# Patient Record
Sex: MALE | Race: BLACK OR AFRICAN AMERICAN | NOT HISPANIC OR LATINO | Employment: FULL TIME | URBAN - METROPOLITAN AREA
[De-identification: names, ages, dates, MRNs, and addresses within clinical notes are randomized per-mention and may not be internally consistent; named-entity substitution may affect disease eponyms.]

---

## 2017-01-09 ENCOUNTER — NURSE TRIAGE (OUTPATIENT)
Dept: ADMINISTRATIVE | Facility: CLINIC | Age: 57
End: 2017-01-09

## 2017-01-09 DIAGNOSIS — I10 ESSENTIAL HYPERTENSION: ICD-10-CM

## 2017-01-09 RX ORDER — LISINOPRIL 40 MG/1
TABLET ORAL
Qty: 135 TABLET | Refills: 0 | Status: SHIPPED | OUTPATIENT
Start: 2017-01-09 | End: 2017-03-02 | Stop reason: SDUPTHER

## 2017-01-10 NOTE — TELEPHONE ENCOUNTER
"  Reason for Disposition   [1] Toe injury AND [2] bad limp or can't wear shoes/sandals    Answer Assessment - Initial Assessment Questions  1. MECHANISM: "How did the injury happen?"       A large metal canister at work rollled over foot  2. ONSET: "When did the injury happen?" (Minutes or hours ago)       About noon today  3. LOCATION: "What part of the toe is injured?" "Is the nail damaged?"       No nail damage. Large toe and next 2 toes of right foot look a little swollen  4. APPEARANCE of TOE INJURY: "What does the injury look like?"       As above  5. SEVERITY: "Can you use the foot normally?" "Can you walk?"       Has been walking all day. Became more painful when he took his shoes off.   6. SIZE: For cuts, bruises, or swelling, ask: "How large is it?" (e.g., inches or centimeters;  entire toe)       Mild swelling only reported  7. PAIN: "Is there pain?" If so, ask: "How bad is the pain?"   (e.g., Scale 1-10; or mild, moderate, severe)      Rated pain about 8.5- joints are tender to touch  8. TETANUS: For any breaks in the skin, ask: "When was the last tetanus booster?"      n/a  9. DIABETES: "Do you have a history of diabetes or poor circulation in the feet?"      no  10. OTHER SYMPTOMS: "Do you have any other symptoms?"         None reported  11. PREGNANCY: "Is there any chance you are pregnant?" "When was your last menstrual period?"        n/a   Has not taken anything for pain. Calling to ask if getting in his jacuzzi will help.    Protocols used: ST TOE INJURY-A-    "

## 2017-01-23 DIAGNOSIS — I51.89 DIASTOLIC DYSFUNCTION: ICD-10-CM

## 2017-01-23 DIAGNOSIS — F51.02 TRANSIENT INSOMNIA: ICD-10-CM

## 2017-01-23 RX ORDER — ALPRAZOLAM 1 MG/1
TABLET ORAL
Qty: 30 TABLET | Refills: 0 | OUTPATIENT
Start: 2017-01-23

## 2017-01-23 RX ORDER — FUROSEMIDE 20 MG/1
TABLET ORAL
Qty: 15 TABLET | Refills: 0 | Status: SHIPPED | OUTPATIENT
Start: 2017-01-23 | End: 2017-03-02 | Stop reason: SDUPTHER

## 2017-01-25 DIAGNOSIS — F51.02 TRANSIENT INSOMNIA: ICD-10-CM

## 2017-01-25 RX ORDER — ALPRAZOLAM 1 MG/1
TABLET ORAL
Qty: 30 TABLET | Refills: 0 | OUTPATIENT
Start: 2017-01-25

## 2017-03-02 ENCOUNTER — OFFICE VISIT (OUTPATIENT)
Dept: FAMILY MEDICINE | Facility: CLINIC | Age: 57
End: 2017-03-02
Payer: COMMERCIAL

## 2017-03-02 VITALS
BODY MASS INDEX: 42.66 KG/M2 | HEART RATE: 60 BPM | HEIGHT: 72 IN | TEMPERATURE: 99 F | DIASTOLIC BLOOD PRESSURE: 70 MMHG | WEIGHT: 315 LBS | SYSTOLIC BLOOD PRESSURE: 128 MMHG

## 2017-03-02 DIAGNOSIS — K21.00 GASTROESOPHAGEAL REFLUX DISEASE WITH ESOPHAGITIS: ICD-10-CM

## 2017-03-02 DIAGNOSIS — I10 ESSENTIAL HYPERTENSION: Primary | ICD-10-CM

## 2017-03-02 DIAGNOSIS — Z13.9 SCREENING: ICD-10-CM

## 2017-03-02 DIAGNOSIS — F51.02 TRANSIENT INSOMNIA: ICD-10-CM

## 2017-03-02 DIAGNOSIS — E78.5 HYPERLIPIDEMIA, UNSPECIFIED HYPERLIPIDEMIA TYPE: ICD-10-CM

## 2017-03-02 DIAGNOSIS — I51.89 DIASTOLIC DYSFUNCTION: ICD-10-CM

## 2017-03-02 PROCEDURE — 99999 PR PBB SHADOW E&M-EST. PATIENT-LVL III: CPT | Mod: PBBFAC,,, | Performed by: FAMILY MEDICINE

## 2017-03-02 PROCEDURE — 99396 PREV VISIT EST AGE 40-64: CPT | Mod: S$GLB,,, | Performed by: FAMILY MEDICINE

## 2017-03-02 PROCEDURE — 3078F DIAST BP <80 MM HG: CPT | Mod: S$GLB,,, | Performed by: FAMILY MEDICINE

## 2017-03-02 PROCEDURE — 3074F SYST BP LT 130 MM HG: CPT | Mod: S$GLB,,, | Performed by: FAMILY MEDICINE

## 2017-03-02 RX ORDER — LOVASTATIN 40 MG/1
TABLET ORAL
Qty: 90 TABLET | Refills: 1 | Status: SHIPPED | OUTPATIENT
Start: 2017-03-02 | End: 2019-03-19 | Stop reason: SDUPTHER

## 2017-03-02 RX ORDER — OMEPRAZOLE 40 MG/1
40 CAPSULE, DELAYED RELEASE ORAL DAILY
Qty: 90 CAPSULE | Refills: 3 | Status: SHIPPED | OUTPATIENT
Start: 2017-03-02 | End: 2018-09-13 | Stop reason: SDUPTHER

## 2017-03-02 RX ORDER — ALPRAZOLAM 1 MG/1
TABLET ORAL
Qty: 30 TABLET | Refills: 5 | Status: SHIPPED | OUTPATIENT
Start: 2017-03-02 | End: 2017-09-08 | Stop reason: SDUPTHER

## 2017-03-02 RX ORDER — FUROSEMIDE 20 MG/1
TABLET ORAL
Qty: 90 TABLET | Refills: 3 | Status: SHIPPED | OUTPATIENT
Start: 2017-03-02 | End: 2018-05-09 | Stop reason: SDUPTHER

## 2017-03-02 RX ORDER — LISINOPRIL 40 MG/1
40 TABLET ORAL DAILY
Qty: 90 TABLET | Refills: 3 | Status: SHIPPED | OUTPATIENT
Start: 2017-03-02 | End: 2018-07-13 | Stop reason: SDUPTHER

## 2017-03-02 NOTE — MR AVS SNAPSHOT
[facility]  [address], [address]  [place] [address]  Phone: [phone]  Fax: [phone]                  Reagan Carbajal   3/2/2017 1:00 PM   Office Visit    Description:  Male : 1960   Provider:  Carmen Cosby MD   Department:  [facility]           Reason for Visit     Annual Exam           Diagnoses this Visit        Comments    Essential hypertension    -  Primary     Diastolic dysfunction         Hyperlipidemia, unspecified hyperlipidemia type         Gastroesophageal reflux disease with esophagitis         Transient insomnia         Screening                To Do List           Goals (5 Years of Data)     None       These Medications        Disp Refills Start End    furosemide (LASIX) 20 MG tablet 90 tablet 3 3/2/2017     TAKE ONETABLET BY MOUTH ONCE DAILY    Pharmacy: [facility] [address] [place] [address] Ph #: [phone]       lisinopril (PRINIVIL,ZESTRIL) 40 MG tablet 90 tablet 3 3/2/2017     Take 1 tablet (40 mg total) by mouth once daily. - Oral    Pharmacy: [facility] [address] [place] [address] Ph #: [phone]       lovastatin (MEVACOR) 40 MG tablet 90 tablet 1 3/2/2017     TAKE ONE TABLET BY MOUTH ONCE DAILY NIGHTLY    Pharmacy: [address] Ph #: [phone]       omeprazole (PRILOSEC) 40 MG capsule 90 capsule 3 3/2/2017     Take 1 capsule (40 mg total) by mouth once daily. - Oral    Pharmacy: [facility] [address] [place] [address] Ph #: [phone]       alprazolam (XANAX) 1 MG tablet 30 tablet 5 3/2/2017     TAKE ONE TABLET BY MOUTH ONCE DAILY AT  NIGHT  AS  NEEDED  FOR  ANXIETY    Pharmacy: [address] Ph #: [phone]         [facility]     [facility] -  Assistance  Registered nurses  in the [facility] provide clinical advisement, health education, appointment booking, and other advisory services.  Call for this free service at [phone].             Medications           Message regarding Medications     Verify the changes and/or additions to your medication regime listed below are the same as discussed with your clinician today.  If any of these changes or additions are incorrect, please notify your healthcare provider.        CHANGE how you are taking these medications     Start Taking Instead of    furosemide (LASIX) 20 MG tablet furosemide (LASIX) 20 MG tablet    Dosage:  TAKE ONETABLET BY MOUTH ONCE DAILY Dosage:  TAKE ONE-HALF TABLET BY MOUTH ONCE DAILY    Reason for Change:  Reorder     lisinopril (PRINIVIL,ZESTRIL) 40 MG tablet lisinopril (PRINIVIL,ZESTRIL) 40 MG tablet    Dosage:  Take 1 tablet (40 mg total) by mouth once daily. Dosage:  TAKE ONE TABLET BY MOUTH ONCE DAILY    Reason for Change:  Reorder            Verify that the below list of medications is an accurate representation of the medications you are currently taking.  If none reported, the list may be blank. If incorrect, please contact your healthcare provider. Carry this list with you in case of emergency.           Current Medications     alprazolam (XANAX) 1 MG tablet TAKE ONE TABLET BY MOUTH ONCE DAILY AT  NIGHT  AS  NEEDED  FOR  ANXIETY    furosemide (LASIX) 20 MG tablet TAKE ONETABLET BY MOUTH ONCE DAILY    lisinopril (PRINIVIL,ZESTRIL) 40 MG tablet Take 1 tablet (40 mg total) by mouth once daily.    lovastatin (MEVACOR) 40 MG tablet TAKE ONE TABLET BY MOUTH ONCE DAILY NIGHTLY    omeprazole (PRILOSEC) 40 MG capsule Take 1 capsule (40 mg total) by mouth once daily.           Clinical Reference Information           Your Vitals Were     BP                   128/70 (BP Location: Right arm)           Blood Pressure          Most Recent Value    BP  128/70      Allergies as of 3/2/2017     Tramadol     Triamterene      Immunizations Administered on Date of Encounter - 3/2/2017     None      Orders Placed During Today's Visit     Future Labs/Procedures Expected by Expires    CBC auto differential  3/2/2017 5/1/2018    Comprehensive metabolic panel  3/2/2017 5/1/2018    Hemoglobin A1c  3/2/2017 5/1/2018    Lipid panel  3/2/2017 5/1/2018    PSA, Screening  3/2/2017 5/1/2018    TSH  3/2/2017 5/1/2018      Language Assistance Services     ATTENTION: Language assistance services are available, free of charge. Please call [phone].      ATENCIÓN: Si habla español, tiene a wilks disposición servicios gratuitos de asistencia lingüística. Llame al [phone].     CHÚ Ý: N?u b?n nói Ti?ng Vi?t, có các d?ch v? h? tr? ngôn ng? mi?n phí dành cho b?n. G?i s? [phone].         [facility] complies with applicable Federal civil rights laws and does not discriminate on the basis of race, color, national origin, age, disability, or sex.

## 2017-03-03 NOTE — PROGRESS NOTES
Reagan Carbajal is a 56 y.o. male     Chief Complaint:  Physical Exam  Source of history: Patient  Past Medical History:   Diagnosis Date    Allergy     Anxiety     Colon polyp     CPAP (continuous positive airway pressure) dependence     does not use    GERD (gastroesophageal reflux disease)     Hyperlipidemia     Hypertension     Insomnia     Insomnia with sleep apnea     Morbid obesity     Neuromuscular disorder     Obesity      Patient  reports that he has quit smoking. His smoking use included Cigarettes and Cigars. He quit smokeless tobacco use about 23 years ago. He reports that he does not drink alcohol or use illicit drugs.  Family History   Problem Relation Age of Onset    Heart disease Mother     Hyperlipidemia Mother     Hypertension Mother     Lymphoma Mother     Hypertension Father     Hepatitis Brother      ROS:                                                                                GENERAL: No fever, chills, fatigability or weight loss. .  SKIN: No rashes, itching or changes in color or texture of skin.  HEAD: No headaches or recent head trauma.  EYES: Visual acuity fine. No photophobia, ocular pain or diplopia.  EARS: Denies ear pain, discharge or vertigo.  NOSE: No loss of smell, no epistaxis or postnasal drip.  MOUTH & THROAT: No hoarseness or change in voice. No excessive gum bleeding.  NODES: Denies swollen glands.  CHEST: Denies STAPLETON, cyanosis, wheezing, cough and sputum production.  CARDIOVASCULAR: Denies chest pain, PND, orthopnea or reduced exercise tolerance.  ABDOMEN: Appetite fine. No weight loss. Denies diarrhea, abdominal pain, hematemesis or blood in stool.  URINARY: No flank pain, dysuria or hematuria. Frequency of urination at nighttime.  PERIPHERAL VASCULAR: No claudication or cyanosis.  MUSCULOSKELETAL: left foot pain  NEUROLOGIC: No history of seizures, paralysis, alteration of gait or coordination.    OBJECTIVE:  APPEARANCE: Well nourished, well developed,  in no acute distress.   VS:  see nurses notes  3-2-17  SKIN: No lesions, good turgor, no rashes.   HEENT: TMs clear bilaterally, ear canals clear bilaterally, nasal mucosa clear bilaterally,   sinuses nontender to percussion, throat mild postnasal drip.  HEAD: Normocephalic, nontender to palpation.  NECK: Supple nontender, no carotid bruits, no thyromegaly.  NODES: Normal.  CHEST: Clear bilaterally, with good respiratory excursion, no evidence of respiratory distress.  CARDIOVASCULAR: S1, S2, regular rate and rhythm without murmur.  BREASTS: No masses palpated in either breast area, or axillary area, symmetry is noted.  ABDOMEN: Soft nontender no hepatosplenomegaly, no guarding or rebound, no pulsatile mass.  PERIPHERAL VASCULAR: Distal pulses palpable throughout, normal capillary refill in all distal extremities, no edema.  MUSCULOSKELETAL: bilateral ankle and foot swelling  BACK: No scoliosis, no spasm, cervical, thoracic, lumbar spine nontender to palpation  NEUROLOGIC: Cranial nerves II through XII grossly intact, motor exam 5 out of 5 on distal extremities,   no gait disturbances, sensation intact in all distal extremities  MENTAL STATUS: Patient oriented x3, normal affect, normal mood    ASSESSMENT/PLAN:   Reagan was seen today for annual exam.    Diagnoses and all orders for this visit:    Essential hypertension  -     CBC auto differential; Future  -     TSH; Future  -     Lipid panel; Future  -     Comprehensive metabolic panel; Future  -     Hemoglobin A1c; Future  -     lisinopril (PRINIVIL,ZESTRIL) 40 MG tablet; Take 1 tablet (40 mg total) by mouth once daily.    Diastolic dysfunction  -     furosemide (LASIX) 20 MG tablet; TAKE ONETABLET BY MOUTH ONCE DAILY    Hyperlipidemia, unspecified hyperlipidemia type  -     lovastatin (MEVACOR) 40 MG tablet; TAKE ONE TABLET BY MOUTH ONCE DAILY NIGHTLY    Gastroesophageal reflux disease with esophagitis  -     omeprazole (PRILOSEC) 40 MG capsule; Take 1 capsule (40  mg total) by mouth once daily.    Transient insomnia  -     alprazolam (XANAX) 1 MG tablet; TAKE ONE TABLET BY MOUTH ONCE DAILY AT  NIGHT  AS  NEEDED  FOR  ANXIETY    Screening  -     PSA, Screening; Future      Will contact pt with results when available. Recommend exercise and graduated weight loss program

## 2017-03-21 ENCOUNTER — TELEPHONE (OUTPATIENT)
Dept: FAMILY MEDICINE | Facility: CLINIC | Age: 57
End: 2017-03-21

## 2017-03-21 NOTE — TELEPHONE ENCOUNTER
Spoke with patient advised him since he just refilled the medication and received 90 tablets his insurance company will not cover it and he would need to pay out of pocket.   Patient states he receives his medication from [facility] and will check with them to see how much it will be.
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
## 2017-05-23 ENCOUNTER — LAB VISIT (OUTPATIENT)
Dept: LAB | Facility: HOSPITAL | Age: 57
End: 2017-05-23
Attending: FAMILY MEDICINE
Payer: COMMERCIAL

## 2017-05-23 DIAGNOSIS — Z13.9 ENCOUNTER FOR SCREENING, UNSPECIFIED: ICD-10-CM

## 2017-05-23 DIAGNOSIS — I10 ESSENTIAL HYPERTENSION: ICD-10-CM

## 2017-05-23 LAB
ALBUMIN SERPL BCP-MCNC: 3.9 G/DL
ALP SERPL-CCNC: 85 U/L
ALT SERPL W/O P-5'-P-CCNC: 20 U/L
ANION GAP SERPL CALC-SCNC: 8 MMOL/L
AST SERPL-CCNC: 18 U/L
BASOPHILS # BLD AUTO: 0.03 K/UL
BASOPHILS NFR BLD: 0.4 %
BILIRUB SERPL-MCNC: 0.6 MG/DL
BUN SERPL-MCNC: 14 MG/DL
CALCIUM SERPL-MCNC: 9.3 MG/DL
CHLORIDE SERPL-SCNC: 105 MMOL/L
CHOLEST/HDLC SERPL: 5 {RATIO}
CO2 SERPL-SCNC: 28 MMOL/L
COMPLEXED PSA SERPL-MCNC: 0.49 NG/ML
CREAT SERPL-MCNC: 1 MG/DL
DIFFERENTIAL METHOD: NORMAL
EOSINOPHIL # BLD AUTO: 0.2 K/UL
EOSINOPHIL NFR BLD: 3 %
ERYTHROCYTE [DISTWIDTH] IN BLOOD BY AUTOMATED COUNT: 13.6 %
EST. GFR  (AFRICAN AMERICAN): >60 ML/MIN/1.73 M^2
EST. GFR  (NON AFRICAN AMERICAN): >60 ML/MIN/1.73 M^2
GLUCOSE SERPL-MCNC: 97 MG/DL
HCT VFR BLD AUTO: 45.7 %
HDL/CHOLESTEROL RATIO: 20.1 %
HDLC SERPL-MCNC: 199 MG/DL
HDLC SERPL-MCNC: 40 MG/DL
HGB BLD-MCNC: 14.9 G/DL
LDLC SERPL CALC-MCNC: 136.2 MG/DL
LYMPHOCYTES # BLD AUTO: 2.4 K/UL
LYMPHOCYTES NFR BLD: 30.7 %
MCH RBC QN AUTO: 30 PG
MCHC RBC AUTO-ENTMCNC: 32.6 %
MCV RBC AUTO: 92 FL
MONOCYTES # BLD AUTO: 0.8 K/UL
MONOCYTES NFR BLD: 10.2 %
NEUTROPHILS # BLD AUTO: 4.4 K/UL
NEUTROPHILS NFR BLD: 55.6 %
NONHDLC SERPL-MCNC: 159 MG/DL
PLATELET # BLD AUTO: 229 K/UL
PMV BLD AUTO: 11.1 FL
POTASSIUM SERPL-SCNC: 4.9 MMOL/L
PROT SERPL-MCNC: 7.2 G/DL
RBC # BLD AUTO: 4.96 M/UL
SODIUM SERPL-SCNC: 141 MMOL/L
TRIGL SERPL-MCNC: 114 MG/DL
TSH SERPL DL<=0.005 MIU/L-ACNC: 2.93 UIU/ML
WBC # BLD AUTO: 7.95 K/UL

## 2017-05-23 PROCEDURE — 85025 COMPLETE CBC W/AUTO DIFF WBC: CPT

## 2017-05-23 PROCEDURE — 84153 ASSAY OF PSA TOTAL: CPT

## 2017-05-23 PROCEDURE — 83036 HEMOGLOBIN GLYCOSYLATED A1C: CPT

## 2017-05-23 PROCEDURE — 36415 COLL VENOUS BLD VENIPUNCTURE: CPT | Mod: PO

## 2017-05-23 PROCEDURE — 80053 COMPREHEN METABOLIC PANEL: CPT

## 2017-05-23 PROCEDURE — 80061 LIPID PANEL: CPT

## 2017-05-23 PROCEDURE — 84443 ASSAY THYROID STIM HORMONE: CPT

## 2017-05-24 LAB
ESTIMATED AVG GLUCOSE: 114 MG/DL
HBA1C MFR BLD HPLC: 5.6 %

## 2017-09-08 DIAGNOSIS — F51.02 TRANSIENT INSOMNIA: ICD-10-CM

## 2017-09-08 RX ORDER — ALPRAZOLAM 1 MG/1
TABLET ORAL
Qty: 30 TABLET | Refills: 0 | Status: SHIPPED | OUTPATIENT
Start: 2017-09-08 | End: 2017-12-08 | Stop reason: SDUPTHER

## 2017-09-11 NOTE — TELEPHONE ENCOUNTER
Attempted to contact patient voicemail left advising to return our call to schedule f/u appt to receive any further refills on requested medication.  Xanax RX faxed to [facility].

## 2017-11-09 ENCOUNTER — HOSPITAL ENCOUNTER (OUTPATIENT)
Dept: RADIOLOGY | Facility: HOSPITAL | Age: 57
Discharge: HOME | End: 2017-11-09
Attending: FAMILY MEDICINE
Payer: COMMERCIAL

## 2017-11-09 ENCOUNTER — OFFICE VISIT (OUTPATIENT)
Dept: FAMILY MEDICINE | Facility: CLINIC | Age: 57
End: 2017-11-09
Payer: COMMERCIAL

## 2017-11-09 VITALS
DIASTOLIC BLOOD PRESSURE: 76 MMHG | SYSTOLIC BLOOD PRESSURE: 146 MMHG | HEIGHT: 70 IN | TEMPERATURE: 98 F | BODY MASS INDEX: 45.1 KG/M2 | WEIGHT: 315 LBS

## 2017-11-09 DIAGNOSIS — M25.562 LEFT KNEE PAIN, UNSPECIFIED CHRONICITY: ICD-10-CM

## 2017-11-09 DIAGNOSIS — M79.671 RIGHT FOOT PAIN: Primary | ICD-10-CM

## 2017-11-09 DIAGNOSIS — M79.671 RIGHT FOOT PAIN: ICD-10-CM

## 2017-11-09 PROCEDURE — 99999 PR PBB SHADOW E&M-EST. PATIENT-LVL III: CPT | Mod: PBBFAC,,, | Performed by: FAMILY MEDICINE

## 2017-11-09 PROCEDURE — 99214 OFFICE O/P EST MOD 30 MIN: CPT | Mod: S$GLB,,, | Performed by: FAMILY MEDICINE

## 2017-11-09 RX ORDER — DICLOFENAC SODIUM 50 MG/1
50 TABLET, DELAYED RELEASE ORAL 2 TIMES DAILY
Qty: 40 TABLET | Refills: 0 | Status: SHIPPED | OUTPATIENT
Start: 2017-11-09 | End: 2017-12-31 | Stop reason: ALTCHOICE

## 2017-11-09 RX ORDER — HYDROCODONE BITARTRATE AND ACETAMINOPHEN 5; 325 MG/1; MG/1
1 TABLET ORAL EVERY 6 HOURS PRN
Qty: 30 TABLET | Refills: 0 | Status: SHIPPED | OUTPATIENT
Start: 2017-11-09 | End: 2018-02-08 | Stop reason: SDUPTHER

## 2017-11-10 ENCOUNTER — HOSPITAL ENCOUNTER (OUTPATIENT)
Dept: RADIOLOGY | Facility: HOSPITAL | Age: 57
Discharge: HOME | End: 2017-11-10
Attending: FAMILY MEDICINE
Payer: COMMERCIAL

## 2017-11-10 ENCOUNTER — TELEPHONE (OUTPATIENT)
Dept: FAMILY MEDICINE | Facility: CLINIC | Age: 57
End: 2017-11-10

## 2017-11-10 DIAGNOSIS — M79.671 RIGHT FOOT PAIN: ICD-10-CM

## 2017-11-10 PROCEDURE — 73630 X-RAY EXAM OF FOOT: CPT | Mod: 26,RT,, | Performed by: RADIOLOGY

## 2017-11-10 PROCEDURE — 73560 X-RAY EXAM OF KNEE 1 OR 2: CPT | Mod: 26,LT,, | Performed by: RADIOLOGY

## 2017-11-10 PROCEDURE — 73560 X-RAY EXAM OF KNEE 1 OR 2: CPT | Mod: TC,LT

## 2017-11-10 PROCEDURE — 73630 X-RAY EXAM OF FOOT: CPT | Mod: TC,RT

## 2017-11-10 NOTE — TELEPHONE ENCOUNTER
Spoke with Lesvia with radiology department   Ext [phone] states you ordered an X-Ray of the left foot but patient stating it's suppose to be the right foot.

## 2017-11-10 NOTE — PROGRESS NOTES
Subjective:       Patient ID: Reagan Carbajal is a 57 y.o. male.    Chief Complaint: Foot Pain (top of right) and Knee Pain (left knee)   patient is having pain on and off over the last couple of weeks.  Patient suspects   he may have gout since it runs in his family.  HPISee above   Review of Systems   Constitutional: Negative.    HENT: Negative.    Eyes: Negative.    Respiratory: Negative.    Cardiovascular: Negative.    Gastrointestinal: Negative.    Endocrine: Negative.    Genitourinary: Negative.    Musculoskeletal: Positive for arthralgias, gait problem and myalgias.   Skin: Positive for color change.   Allergic/Immunologic: Negative.    Neurological: Positive for numbness.   Hematological: Negative.    Psychiatric/Behavioral: Negative.        Objective:      Physical Exam   Constitutional: He appears well-developed and well-nourished. He appears distressed.   HENT:   Head: Normocephalic and atraumatic.   Eyes: Conjunctivae and EOM are normal. Pupils are equal, round, and reactive to light.   Neck: Normal range of motion. Neck supple. No JVD present.   Musculoskeletal:        Right knee: Normal.        Left knee: He exhibits swelling and deformity. He exhibits no erythema. Tenderness found. Patellar tendon tenderness noted. No lateral joint line tenderness noted.        Right ankle: Normal.        Left ankle: Normal.        Right foot: There is decreased range of motion, tenderness, swelling and deformity.        Left foot: Normal.   Nursing note and vitals reviewed.      Assessment:       1. Right foot pain    2. Left knee pain, unspecified chronicity     3.     Possible gout  Plan:     will contact the patient results of testing when available.   Uric acid            X-Ray Foot Complete Left         X-Ray Knee 1 or 2 View Left        Referring med card dated 11/9/2017  diclofenac (VOLTAREN) 50 MG EC tablet  hydrocodone-acetaminophen 5-325mg (NORCO) 5-325 mg per tablet

## 2017-11-13 ENCOUNTER — TELEPHONE (OUTPATIENT)
Dept: FAMILY MEDICINE | Facility: CLINIC | Age: 57
End: 2017-11-13

## 2017-12-08 DIAGNOSIS — F51.02 TRANSIENT INSOMNIA: ICD-10-CM

## 2017-12-09 RX ORDER — ALPRAZOLAM 1 MG/1
TABLET ORAL
Qty: 30 TABLET | Refills: 5 | Status: SHIPPED | OUTPATIENT
Start: 2017-12-09 | End: 2018-06-20 | Stop reason: SDUPTHER

## 2017-12-31 ENCOUNTER — OFFICE VISIT (OUTPATIENT)
Dept: FAMILY MEDICINE | Facility: CLINIC | Age: 57
End: 2017-12-31
Payer: COMMERCIAL

## 2017-12-31 VITALS
TEMPERATURE: 100 F | BODY MASS INDEX: 45.1 KG/M2 | HEIGHT: 70 IN | WEIGHT: 315 LBS | SYSTOLIC BLOOD PRESSURE: 145 MMHG | HEART RATE: 82 BPM | DIASTOLIC BLOOD PRESSURE: 79 MMHG | OXYGEN SATURATION: 97 %

## 2017-12-31 DIAGNOSIS — J01.90 ACUTE BACTERIAL SINUSITIS: Primary | ICD-10-CM

## 2017-12-31 DIAGNOSIS — I10 ESSENTIAL HYPERTENSION: ICD-10-CM

## 2017-12-31 DIAGNOSIS — B96.89 ACUTE BACTERIAL SINUSITIS: Primary | ICD-10-CM

## 2017-12-31 DIAGNOSIS — R50.9 FEVER AND CHILLS: ICD-10-CM

## 2017-12-31 DIAGNOSIS — M10.9 GOUT, UNSPECIFIED CAUSE, UNSPECIFIED CHRONICITY, UNSPECIFIED SITE: ICD-10-CM

## 2017-12-31 DIAGNOSIS — M79.10 MYALGIA: ICD-10-CM

## 2017-12-31 LAB
CTP QC/QA: YES
FLUAV AG NPH QL: NEGATIVE
FLUBV AG NPH QL: NEGATIVE

## 2017-12-31 PROCEDURE — 99214 OFFICE O/P EST MOD 30 MIN: CPT | Mod: 25,S$GLB,, | Performed by: NURSE PRACTITIONER

## 2017-12-31 PROCEDURE — 87804 INFLUENZA ASSAY W/OPTIC: CPT | Mod: 59,QW,S$GLB, | Performed by: NURSE PRACTITIONER

## 2017-12-31 PROCEDURE — 96372 THER/PROPH/DIAG INJ SC/IM: CPT | Mod: S$GLB,,, | Performed by: INTERNAL MEDICINE

## 2017-12-31 PROCEDURE — 99999 PR PBB SHADOW E&M-EST. PATIENT-LVL IV: CPT | Mod: PBBFAC,,, | Performed by: NURSE PRACTITIONER

## 2017-12-31 RX ORDER — ACETAMINOPHEN 500 MG
1000 TABLET ORAL
Status: COMPLETED | OUTPATIENT
Start: 2017-12-31 | End: 2017-12-31

## 2017-12-31 RX ORDER — METHYLPREDNISOLONE 4 MG/1
TABLET ORAL
Qty: 1 PACKAGE | Refills: 0 | Status: SHIPPED | OUTPATIENT
Start: 2017-12-31 | End: 2018-01-21

## 2017-12-31 RX ORDER — INDOMETHACIN 50 MG/1
50 CAPSULE ORAL
Qty: 90 CAPSULE | Refills: 0 | Status: SHIPPED | OUTPATIENT
Start: 2017-12-31 | End: 2018-09-13 | Stop reason: SDUPTHER

## 2017-12-31 RX ORDER — CODEINE PHOSPHATE AND GUAIFENESIN 10; 100 MG/5ML; MG/5ML
5 SOLUTION ORAL 3 TIMES DAILY PRN
Qty: 118 ML | Refills: 0 | Status: SHIPPED | OUTPATIENT
Start: 2017-12-31 | End: 2018-01-10

## 2017-12-31 RX ORDER — TRIAMCINOLONE ACETONIDE 40 MG/ML
40 INJECTION, SUSPENSION INTRA-ARTICULAR; INTRAMUSCULAR
Status: COMPLETED | OUTPATIENT
Start: 2017-12-31 | End: 2017-12-31

## 2017-12-31 RX ORDER — AMOXICILLIN AND CLAVULANATE POTASSIUM 875; 125 MG/1; MG/1
1 TABLET, FILM COATED ORAL 2 TIMES DAILY
Qty: 20 TABLET | Refills: 0 | Status: SHIPPED | OUTPATIENT
Start: 2017-12-31 | End: 2018-01-10

## 2017-12-31 RX ADMIN — TRIAMCINOLONE ACETONIDE 40 MG: 40 INJECTION, SUSPENSION INTRA-ARTICULAR; INTRAMUSCULAR at 11:12

## 2017-12-31 RX ADMIN — Medication 1000 MG: at 10:12

## 2017-12-31 NOTE — PATIENT INSTRUCTIONS
Sinusitis (Antibiotic Treatment)    The sinuses are air-filled spaces within the bones of the face. They connect to the inside of the nose. Sinusitis is an inflammation of the tissue lining the sinus cavity. Sinus inflammation can occur during a cold. It can also be due to allergies to pollens and other particles in the air. Sinusitis can cause symptoms of sinus congestion and fullness. A sinus infection causes fever, headache and facial pain. There is often green or yellow drainage from the nose or into the back of the throat (post-nasal drip). You have been given antibiotics to treat this condition.  Home care:  · Take the full course of antibiotics as instructed. Do not stop taking them, even if you feel better.  · Drink plenty of water, hot tea, and other liquids. This may help thin mucus. It also may promote sinus drainage.  · Heat may help soothe painful areas of the face. Use a towel soaked in hot water. Or,  the shower and direct the hot spray onto your face. Using a vaporizer along with a menthol rub at night may also help.   · An expectorant containing guaifenesin may help thin the mucus and promote drainage from the sinuses.  · Over-the-counter decongestants may be used unless a similar medicine was prescribed. Nasal sprays work the fastest. Use one that contains phenylephrine or oxymetazoline. First blow the nose gently. Then use the spray. Do not use these medicines more often than directed on the label or symptoms may get worse. You may also use tablets containing pseudoephedrine. Avoid products that combine ingredients, because side effects may be increased. Read labels. You can also ask the pharmacist for help. (NOTE: Persons with high blood pressure should not use decongestants. They can raise blood pressure.)  · Over-the-counter antihistamines may help if allergies contributed to your sinusitis.    · Do not use nasal rinses or irrigation during an acute sinus infection, unless told to by  your health care provider. Rinsing may spread the infection to other sinuses.  · Use acetaminophen or ibuprofen to control pain, unless another pain medicine was prescribed. (If you have chronic liver or kidney disease or ever had a stomach ulcer, talk with your doctor before using these medicines. Aspirin should never be used in anyone under 18 years of age who is ill with a fever. It may cause severe liver damage.)  · Don't smoke. This can worsen symptoms.  Follow-up care  Follow up with your healthcare provider or our staff if you are not improving within the next week.  When to seek medical advice  Call your healthcare provider if any of these occur:  · Facial pain or headache becoming more severe  · Stiff neck  · Unusual drowsiness or confusion  · Swelling of the forehead or eyelids  · Vision problems, including blurred or double vision  · Fever of 100.4ºF (38ºC) or higher, or as directed by your healthcare provider  · Seizure  · Breathing problems  · Symptoms not resolving within 10 days  Date Last Reviewed: 4/13/2015  © 3085-7885 [employer] [address] [place] [address], [place] [address]. All rights reserved. This information is not intended as a substitute for professional medical care. Always follow your healthcare professional's instructions.

## 2017-12-31 NOTE — PROGRESS NOTES
Subjective:       Patient ID: Reagan Carbajal is a 57 y.o. male.    Chief Complaint: Cough (non productive x 2days) and Generalized Body Aches    Cough   This is a new problem. The current episode started in the past 7 days. The problem has been gradually worsening. The problem occurs every few minutes. The cough is non-productive. Associated symptoms include chills, a fever, myalgias, nasal congestion, rhinorrhea and wheezing. Pertinent negatives include no chest pain, ear congestion, ear pain, headaches, heartburn, hemoptysis, postnasal drip, rash, sore throat, shortness of breath, sweats or weight loss. Nothing aggravates the symptoms.     Review of Systems   Constitutional: Positive for chills, fatigue and fever. Negative for activity change, appetite change, diaphoresis and weight loss.   HENT: Positive for congestion (mi8ld), rhinorrhea and sinus pressure. Negative for ear pain, postnasal drip, sinus pain, sneezing, sore throat and tinnitus.    Respiratory: Positive for cough and wheezing. Negative for hemoptysis, chest tightness and shortness of breath.    Cardiovascular: Negative for chest pain.   Gastrointestinal: Negative for heartburn.   Musculoskeletal: Positive for myalgias. Negative for arthralgias and back pain.   Skin: Negative for rash.   Neurological: Negative for headaches.   Hematological: Negative for adenopathy. Does not bruise/bleed easily.       Objective:      Physical Exam   Constitutional: He is oriented to person, place, and time. Vital signs are normal. He appears well-developed and well-nourished.   HENT:   Head: Normocephalic and atraumatic.   Right Ear: Tympanic membrane, external ear and ear canal normal.   Left Ear: Tympanic membrane, external ear and ear canal normal.   Nose: Mucosal edema and rhinorrhea present. Right sinus exhibits frontal sinus tenderness. Right sinus exhibits no maxillary sinus tenderness. Left sinus exhibits frontal sinus tenderness. Left sinus exhibits no  maxillary sinus tenderness.   Mouth/Throat: Oropharynx is clear and moist and mucous membranes are normal. No oropharyngeal exudate or posterior oropharyngeal erythema.   Cardiovascular: Normal rate, regular rhythm and normal heart sounds.    Pulmonary/Chest: Effort normal and breath sounds normal. No respiratory distress. He has no wheezes. He has no rales.   Lymphadenopathy:     He has cervical adenopathy.   Neurological: He is alert and oriented to person, place, and time.   Skin: Skin is warm, dry and intact. No rash noted.   Psychiatric: He has a normal mood and affect.   Vitals reviewed.      Assessment:       1. Acute bacterial sinusitis    2. Fever and chills    3. Myalgia    4. Gout, unspecified cause, unspecified chronicity, unspecified site    5. Essential hypertension        Plan:       Reagan was seen today for cough and generalized body aches.    Diagnoses and all orders for this visit:    Acute bacterial sinusitis  -     methylPREDNISolone (MEDROL DOSEPACK) 4 mg tablet; use as directed  -     guaifenesin-codeine 100-10 mg/5 ml (TUSSI-ORGANIDIN NR)  mg/5 mL syrup; Take 5 mLs by mouth 3 (three) times daily as needed for Cough.  -     amoxicillin-clavulanate 875-125mg (AUGMENTIN) 875-125 mg per tablet; Take 1 tablet by mouth 2 (two) times daily.  -     triamcinolone acetonide injection 40 mg; Inject 1 mL (40 mg total) into the muscle one time.    Fever and chills  -     POCT Influenza A/B  -     acetaminophen tablet 1,000 mg; Take 2 tablets (1,000 mg total) by mouth one time.  -     methylPREDNISolone (MEDROL DOSEPACK) 4 mg tablet; use as directed  -     guaifenesin-codeine 100-10 mg/5 ml (TUSSI-ORGANIDIN NR)  mg/5 mL syrup; Take 5 mLs by mouth 3 (three) times daily as needed for Cough.  -     triamcinolone acetonide injection 40 mg; Inject 1 mL (40 mg total) into the muscle one time.    Myalgia  -     POCT Influenza A/B  -     acetaminophen tablet 1,000 mg; Take 2 tablets (1,000 mg total) by  mouth one time.  -     methylPREDNISolone (MEDROL DOSEPACK) 4 mg tablet; use as directed  -     guaifenesin-codeine 100-10 mg/5 ml (TUSSI-ORGANIDIN NR)  mg/5 mL syrup; Take 5 mLs by mouth 3 (three) times daily as needed for Cough.  -     triamcinolone acetonide injection 40 mg; Inject 1 mL (40 mg total) into the muscle one time.  HOME CARE:  · Drink plenty of water, hot tea, and other liquids to stay well hydrated. This thins the mucus and promotes sinus drainage.  · Apply heat to the painful areas of the face. Use a towel soaked in hot water. Or,  the shower and direct the hot spray onto your face. This is a good way to inhale warm water vapor and get heat on your face at the same time. (Cover your mouth and nose with your hands so you can still breathe as you do this.)  · Use a vaporizer with products such as Vicks VapoRub (contains menthol) at night. Suck on peppermint, menthol or eucalyptus hard candies during the day.  · An expectorant containing guaifenesin (such as Robitussin), helps to thin the mucus and promote drainage from the sinuses.  · Over-the-counter decongestants may be used unless a similar medicine was prescribed. Nasal sprays work the fastest. Use one that contains phenylephrine (Saw-synephrine, Sinex and others) or oxymetazoline (Afrin). First blow the nose gently to remove mucus, then apply the drops. Do not use these medicines more often than directed on the label or for more than three days or symptoms may worsen. You may also use tablets containing pseudoephedrine (Sudafed). Many sinus remedies combine ingredients, which may increase side effects. Read the labels or ask the pharmacist for help. NOTE: Persons with high blood pressure should not use decongestants. They can raise blood pressure.  · Antihistamines are useful if allergies are a cause of your sinusitis. The mildest one is chlorpheniramine (available without a prescription). The dose for adults is 8-12mg three times a  day. [NOTE: Do not use chlorpheniramine if you have glaucoma or if you are a man with trouble urinating due to an enlarged prostate.] Claritin (loratidine) is an antihistamine that causes less drowsiness and is a good alternative for daytime use.  · Do not use nasal rinses or irrigation during an acute sinus infection, unless advised by your doctor. Rinsing may spread the infection to other sinuses.  · You may use acetaminophen (Tylenol) or ibuprofen (Motrin, Advil) to control pain, unless another pain medicine was prescribed. [ NOTE: If you have chronic liver or kidney disease or ever had a stomach ulcer, talk with your doctor before using these medicines.] (Aspirin should never be used in anyone under 18 years of age who is ill with a fever. It may cause severe liver damage.)  · Finish the full course, even if you are feeling better after a few days.  FOLLOW UP with your doctor or this facility in one week or as instructed by our staff if not improving.  GET PROMPT MEDICAL ATTENTION if any of the following occur:  · Facial pain or headache becomes more severe  · Stiff neck  · Unusual drowsiness or confusion, or not acting like your normal self  · Swelling of the forehead or eyelids  · Vision problems including blurred or double vision  · Fever of 100.4ºF (38ºC) or higher, or as directed by your healthcare provider  · Seizure  © 4422-5246 [facility], [address] [place] [address], [place] [address]. All rights reserved. This information is not intended as a substitute for professional medical care. Always follow your healthcare professional's instructions.      Gout, unspecified cause, unspecified chronicity, unspecified site  -     indomethacin (INDOCIN) 50 MG capsule; Take 1 capsule (50 mg total) by mouth 3 (three) times daily with meals.    Essential hypertension  Avoid using decongestants with your history of High blood pressure. Continue current medications and follow up with PCP as scheduled.

## 2018-01-23 ENCOUNTER — NURSE TRIAGE (OUTPATIENT)
Dept: ADMINISTRATIVE | Facility: CLINIC | Age: 58
End: 2018-01-23

## 2018-01-24 ENCOUNTER — TELEPHONE (OUTPATIENT)
Dept: FAMILY MEDICINE | Facility: CLINIC | Age: 58
End: 2018-01-24

## 2018-01-24 DIAGNOSIS — M10.9 GOUT, UNSPECIFIED CAUSE, UNSPECIFIED CHRONICITY, UNSPECIFIED SITE: Primary | ICD-10-CM

## 2018-01-24 RX ORDER — INDOMETHACIN 50 MG/1
50 CAPSULE ORAL
Qty: 30 CAPSULE | Refills: 1 | Status: SHIPPED | OUTPATIENT
Start: 2018-01-24 | End: 2018-02-08 | Stop reason: SDUPTHER

## 2018-01-24 NOTE — TELEPHONE ENCOUNTER
"Patient stated that he is having right foot gout pain. Did not get pain medications that were prescribed on his last visit. Inquired about whether soaking his foot in a jacuzzi tub would help and told him that I was unsure of that. Informed him that I would send a message to the provider. Advised per protocol and he verbalized understanding. Instructed to call back with any additional problems and/or concerns    Reason for Disposition   Pain in the big toe joint    Answer Assessment - Initial Assessment Questions  1. ONSET: "When did the pain start?"       Today  2. LOCATION: "Where is the pain located?"       Right foot  3. PAIN: "How bad is the pain?"    (Scale 1-10; or mild, moderate, severe)    -  MILD (1-3): doesn't interfere with normal activities     -  MODERATE (4-7): interferes with normal activities (e.g., work or school) or awakens from sleep, limping     -  SEVERE (8-10): excruciating pain, unable to do any normal activities, unable to walk      9/10  4. WORK OR EXERCISE: "Has there been any recent work or exercise that involved this part of the body?"       no  5. CAUSE: "What do you think is causing the foot pain?"      gout  6. OTHER SYMPTOMS: "Do you have any other symptoms?" (e.g., leg pain, rash, fever, numbness)      no  7. PREGNANCY: "Is there any chance you are pregnant?" "When was your last menstrual period?"      N/A    Protocols used: ST FOOT PAIN-A-      "

## 2018-02-08 ENCOUNTER — OFFICE VISIT (OUTPATIENT)
Dept: FAMILY MEDICINE | Facility: CLINIC | Age: 58
End: 2018-02-08
Payer: COMMERCIAL

## 2018-02-08 VITALS
HEART RATE: 62 BPM | SYSTOLIC BLOOD PRESSURE: 159 MMHG | WEIGHT: 315 LBS | BODY MASS INDEX: 42.66 KG/M2 | DIASTOLIC BLOOD PRESSURE: 86 MMHG | TEMPERATURE: 98 F | HEIGHT: 72 IN

## 2018-02-08 DIAGNOSIS — M79.671 RIGHT FOOT PAIN: ICD-10-CM

## 2018-02-08 DIAGNOSIS — M13.869 ALLERGIC ARTHRITIS OF KNEE: Primary | ICD-10-CM

## 2018-02-08 PROCEDURE — 99213 OFFICE O/P EST LOW 20 MIN: CPT | Mod: 25,S$GLB,, | Performed by: FAMILY MEDICINE

## 2018-02-08 PROCEDURE — 96372 THER/PROPH/DIAG INJ SC/IM: CPT | Mod: S$GLB,,, | Performed by: FAMILY MEDICINE

## 2018-02-08 PROCEDURE — 3008F BODY MASS INDEX DOCD: CPT | Mod: S$GLB,,, | Performed by: FAMILY MEDICINE

## 2018-02-08 PROCEDURE — 99999 PR PBB SHADOW E&M-EST. PATIENT-LVL III: CPT | Mod: PBBFAC,,, | Performed by: FAMILY MEDICINE

## 2018-02-08 RX ORDER — DEXAMETHASONE SODIUM PHOSPHATE 4 MG/ML
4 INJECTION, SOLUTION INTRA-ARTICULAR; INTRALESIONAL; INTRAMUSCULAR; INTRAVENOUS; SOFT TISSUE
Status: COMPLETED | OUTPATIENT
Start: 2018-02-08 | End: 2018-02-08

## 2018-02-08 RX ORDER — HYDROCODONE BITARTRATE AND ACETAMINOPHEN 5; 325 MG/1; MG/1
1 TABLET ORAL EVERY 6 HOURS PRN
Qty: 30 TABLET | Refills: 0 | Status: SHIPPED | OUTPATIENT
Start: 2018-02-08 | End: 2018-09-13

## 2018-02-08 RX ADMIN — DEXAMETHASONE SODIUM PHOSPHATE 4 MG: 4 INJECTION, SOLUTION INTRA-ARTICULAR; INTRALESIONAL; INTRAMUSCULAR; INTRAVENOUS; SOFT TISSUE at 08:02

## 2018-03-05 ENCOUNTER — NURSE TRIAGE (OUTPATIENT)
Dept: ADMINISTRATIVE | Facility: CLINIC | Age: 58
End: 2018-03-05

## 2018-03-06 NOTE — TELEPHONE ENCOUNTER
"  Reason for Disposition   [1] Fall in systolic BP > 20 mm Hg from normal AND [2] NOT dizzy, lightheaded, or weak  (all triage questions negative)   Many floaters in the eye   [1] Chest pain lasting <= 5 minutes AND [2] NO chest pain or cardiac symptoms now(Exceptions: pains lasting a few seconds)    Protocols used: ST LOW BLOOD PRESSURE-A-AH, ST VISION LOSS OR CHANGE-A-AH, ST CHEST PAIN-A-AH    Pt states that he took his lisinopril and lovastatin this morning.  Pt states he was sitting at table and felt like his peripheral vision to L eye had "spots."  Pt states visual disturbance lasted appox 5 minutes.  Pt denies visual disturbance at this time.  Pt states he took his BP, lowest BP was 120/43. Pt states latest BP is 145/62, HR "80 something." Pt denies any symptoms at this time.  Pt advised per protocol, verbalizes understanding.  Unable to make appointment within 24 hours. Message sent to PCP.   "

## 2018-05-09 DIAGNOSIS — I51.89 DIASTOLIC DYSFUNCTION: ICD-10-CM

## 2018-05-09 RX ORDER — FUROSEMIDE 20 MG/1
TABLET ORAL
Qty: 90 TABLET | Refills: 3 | Status: SHIPPED | OUTPATIENT
Start: 2018-05-09 | End: 2019-07-09 | Stop reason: SDUPTHER

## 2018-06-20 DIAGNOSIS — F51.02 TRANSIENT INSOMNIA: ICD-10-CM

## 2018-06-20 RX ORDER — ALPRAZOLAM 1 MG/1
TABLET ORAL
Qty: 30 TABLET | Refills: 5 | Status: SHIPPED | OUTPATIENT
Start: 2018-06-20 | End: 2019-03-19 | Stop reason: SDUPTHER

## 2018-07-13 DIAGNOSIS — I10 ESSENTIAL HYPERTENSION: ICD-10-CM

## 2018-07-13 RX ORDER — LISINOPRIL 40 MG/1
TABLET ORAL
Qty: 90 TABLET | Refills: 3 | Status: SHIPPED | OUTPATIENT
Start: 2018-07-13 | End: 2019-07-09 | Stop reason: SDUPTHER

## 2018-09-13 ENCOUNTER — OFFICE VISIT (OUTPATIENT)
Dept: INTERNAL MEDICINE | Facility: CLINIC | Age: 58
End: 2018-09-13
Payer: COMMERCIAL

## 2018-09-13 ENCOUNTER — HOSPITAL ENCOUNTER (OUTPATIENT)
Dept: RADIOLOGY | Facility: HOSPITAL | Age: 58
Discharge: HOME | End: 2018-09-13
Attending: INTERNAL MEDICINE
Payer: COMMERCIAL

## 2018-09-13 ENCOUNTER — NURSE TRIAGE (OUTPATIENT)
Dept: ADMINISTRATIVE | Facility: CLINIC | Age: 58
End: 2018-09-13

## 2018-09-13 VITALS
BODY MASS INDEX: 42.66 KG/M2 | WEIGHT: 315 LBS | OXYGEN SATURATION: 97 % | DIASTOLIC BLOOD PRESSURE: 80 MMHG | SYSTOLIC BLOOD PRESSURE: 144 MMHG | HEART RATE: 93 BPM | HEIGHT: 72 IN

## 2018-09-13 DIAGNOSIS — R07.89 CHEST WALL PAIN: ICD-10-CM

## 2018-09-13 DIAGNOSIS — K21.9 GASTROESOPHAGEAL REFLUX DISEASE WITHOUT ESOPHAGITIS: Primary | ICD-10-CM

## 2018-09-13 DIAGNOSIS — M10.9 GOUT, UNSPECIFIED CAUSE, UNSPECIFIED CHRONICITY, UNSPECIFIED SITE: ICD-10-CM

## 2018-09-13 PROCEDURE — 71046 X-RAY EXAM CHEST 2 VIEWS: CPT | Mod: 26,,, | Performed by: RADIOLOGY

## 2018-09-13 PROCEDURE — 71046 X-RAY EXAM CHEST 2 VIEWS: CPT | Mod: TC

## 2018-09-13 PROCEDURE — 3008F BODY MASS INDEX DOCD: CPT | Mod: CPTII,S$GLB,, | Performed by: INTERNAL MEDICINE

## 2018-09-13 PROCEDURE — 99214 OFFICE O/P EST MOD 30 MIN: CPT | Mod: S$GLB,,, | Performed by: INTERNAL MEDICINE

## 2018-09-13 PROCEDURE — 3079F DIAST BP 80-89 MM HG: CPT | Mod: CPTII,S$GLB,, | Performed by: INTERNAL MEDICINE

## 2018-09-13 PROCEDURE — 99999 PR PBB SHADOW E&M-EST. PATIENT-LVL IV: CPT | Mod: PBBFAC,,, | Performed by: INTERNAL MEDICINE

## 2018-09-13 PROCEDURE — 3077F SYST BP >= 140 MM HG: CPT | Mod: CPTII,S$GLB,, | Performed by: INTERNAL MEDICINE

## 2018-09-13 RX ORDER — INDOMETHACIN 50 MG/1
50 CAPSULE ORAL 3 TIMES DAILY PRN
Qty: 90 CAPSULE | Refills: 3 | Status: SHIPPED | OUTPATIENT
Start: 2018-09-13 | End: 2019-03-19 | Stop reason: SDUPTHER

## 2018-09-13 RX ORDER — OMEPRAZOLE 40 MG/1
40 CAPSULE, DELAYED RELEASE ORAL DAILY
Qty: 90 CAPSULE | Refills: 4 | Status: SHIPPED | OUTPATIENT
Start: 2018-09-13 | End: 2019-07-09 | Stop reason: SDUPTHER

## 2018-09-13 NOTE — PROGRESS NOTES
INTERNAL MEDICINE CLINIC - SAME DAY APPOINTMENT  Progress Note    PRESENTING HISTORY     PCP: Carmen Cosby MD  Chief Complaint/Reason for Visit:     Chief Complaint   Patient presents with    Back Pain     History of Present Illness & ROS : Mr. Reagan Carbajal is a 58 y.o. male.      He complains of right thoracic 3 days ago  Pain worse with movement and deep breath.  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  He was trying to mix some concrete.    Pain is sharp.  It comes and goes.    He has increased gas.  He takes OTC med for gas.    No arm or shoulder pain.      No pain currently.     No fever or chill.  No coughing.    PAST HISTORY:     Past Medical History:   Diagnosis Date    Anxiety     Colon polyp     CPAP (continuous positive airway pressure) dependence     does not use    GERD (gastroesophageal reflux disease)     Gout 9/13/2018    HTN (hypertension) 11/12/2012    Hyperlipidemia     Insomnia with sleep apnea     Obesity, morbid, BMI 40.0-49.9 6/12/2014       Past Surgical History:   Procedure Laterality Date    COLONOSCOPY N/A 5/3/2016    Procedure: COLONOSCOPY;  Surgeon: Chance Glynn MD;  Location: [facility] ([address] [place] [address]);  Service: Endoscopy;  Laterality: N/A;    COLONOSCOPY N/A 5/3/2016    Performed by Chance Glynn MD at [facility] ([address] [place] [address])    COLONOSCOPY W/ POLYPECTOMY         Family History   Problem Relation Age of Onset    Heart disease Mother     Hyperlipidemia Mother     Hypertension Mother     Lymphoma Mother     Hypertension Father     Hepatitis Brother        Social History     Socioeconomic History    Marital status:      Spouse name: None    Number of children: None    Years of education: None    Highest education level: None   Social Needs    Financial resource strain: None    Food insecurity - worry: None    Food insecurity - inability: None    Transportation needs - medical: None    Transportation needs - non-medical: None   Occupational History      Employer: [employer]   Tobacco Use    Smoking status: Former Smoker     Types: Cigarettes, Cigars     Last attempt to quit: 5/3/1993     Years since quittin.3    Smokeless tobacco: Never Used   Substance and Sexual Activity    Alcohol use: No     Alcohol/week: 0.0 oz    Drug use: No    Sexual activity: Yes     Partners: Female     Birth control/protection: None   Other Topics Concern    None   Social History Narrative    None       MEDICATIONS & ALLERGIES:     Current Outpatient Medications on File Prior to Visit   Medication Sig Dispense Refill    ALPRAZolam (XANAX) 1 MG tablet TAKE ONE TABLET BY MOUTH ONCE DAILY AT  NIGHT  AS  NEEDED  FOR  ANXEITY 30 tablet 5    furosemide (LASIX) 20 MG tablet TAKE ONE TABLET BY MOUTH ONCE DAILY 90 tablet 3    lisinopril (PRINIVIL,ZESTRIL) 40 MG tablet TAKE ONE TABLET BY MOUTH ONCE DAILY 90 tablet 3    lovastatin (MEVACOR) 40 MG tablet TAKE ONE TABLET BY MOUTH ONCE DAILY NIGHTLY 90 tablet 1            indomethacin (INDOCIN) 50 MG capsule Take 1 capsule (50 mg total) by mouth 3 (three) times daily with meals. 90 capsule 0     omeprazole (PRILOSEC) 40 MG capsule Take 1 capsule (40 mg total) by mouth once daily. 90 capsule 3     No current facility-administered medications on file prior to visit.         Review of patient's allergies indicates:   Allergen Reactions    Tramadol      Nausea      Triamterene      Medications Reconciliation:   I have reconciled the patient's home medications with the patient/family. I have updated all changes.  Refer to After-Visit Medication List.    OBJECTIVE:     Vital Signs:  Vitals:    18 1757   BP: (!) 144/80   Pulse: 93     Wt Readings from Last 1 Encounters:   18 1757 (!) 150.9 kg (332 lb 10.8 oz)     Body mass index is 45.12 kg/m².     Physical Exam:  General: Well developed, well nourished. No distress.  HEENT: Head is normocephalic, atraumatic  Eyes: Clear conjunctiva.  Neck: Supple, symmetrical neck;  trachea midline.  Lungs: Clear to auscultation bilaterally and normal respiratory effort.  Normal chest wall by palpation. No tenderness.  Cardiovascular: Heart with regular rate and rhythm.    Extremities: No LE edema.   Abdomen: Abdomen is soft, non-tender non-distended with normal bowel sounds.  Skin: Skin color, texture, turgor normal. No rashes.  Musculoskeletal: Normal gait.   Neurologic: Normal strength and tone. No focal numbness or weakness.   Psychiatric: Normal affect. Alert.    Laboratory  Lab Results   Component Value Date    WBC 7.95 05/23/2017    HGB 14.9 05/23/2017    HCT 45.7 05/23/2017     05/23/2017    CHOL 199 05/23/2017    TRIG 114 05/23/2017    HDL 40 05/23/2017    ALT 20 05/23/2017    AST 18 05/23/2017     05/23/2017    K 4.9 05/23/2017     05/23/2017    CREATININE 1.0 05/23/2017    BUN 14 05/23/2017    CO2 28 05/23/2017    TSH 2.932 05/23/2017    PSA 0.49 05/23/2017    HGBA1C 5.6 05/23/2017       ASSESSMENT & PLAN:     Gastroesophageal reflux disease without esophagitis  Chest wall pain  - Chest wall pain on the right may be referred pain from reflux.    Will rule out intrathoracic etiology.    Refill PPI.   -     omeprazole (PRILOSEC) 40 MG capsule; Take 1 capsule (40 mg total) by mouth once daily.  Dispense: 90 capsule; Refill: 4  -     X-Ray Chest PA And Lateral; Future; Expected date: 09/13/2018    Gout, unspecified cause, unspecified chronicity, unspecified site  - Refilled:  -     indomethacin (INDOCIN) 50 MG capsule; Take 1 capsule (50 mg total) by mouth 3 (three) times daily as needed (Gout pain).  Dispense: 90 capsule; Refill: 3    Scheduled Follow-up :  Future Appointments   Date Time Provider Department Center   9/13/2018  6:30 PM [facility]       After Visit Medication List :     Medication List           Accurate as of 9/13/18  6:11 PM. If you have any questions, ask your nurse or doctor.               CHANGE how you take these  medications    indomethacin 50 MG capsule  Commonly known as:  INDOCIN  Take 1 capsule (50 mg total) by mouth 3 (three) times daily as needed (Gout pain).  What changed:    · when to take this  · reasons to take this  Changed by:  Abraham Flores MD        CONTINUE taking these medications    ALPRAZolam 1 MG tablet  Commonly known as:  XANAX  TAKE ONE TABLET BY MOUTH ONCE DAILY AT  NIGHT  AS  NEEDED  FOR  ANXEITY     furosemide 20 MG tablet  Commonly known as:  LASIX  TAKE ONE TABLET BY MOUTH ONCE DAILY     lisinopril 40 MG tablet  Commonly known as:  PRINIVIL,ZESTRIL  TAKE ONE TABLET BY MOUTH ONCE DAILY     lovastatin 40 MG tablet  Commonly known as:  MEVACOR  TAKE ONE TABLET BY MOUTH ONCE DAILY NIGHTLY     omeprazole 40 MG capsule  Commonly known as:  PRILOSEC  Take 1 capsule (40 mg total) by mouth once daily.        STOP taking these medications    HYDROcodone-acetaminophen 5-325 mg per tablet  Commonly known as:  NORCO  Stopped by:  Abraham Flores MD           Where to Get Your Medications      These medications were sent to [facility] [address] [place] [address] ([place], [place] - [address] [facility]  [address] [facility] [place] [facility] ([place] [address]    Phone:  [phone]   · indomethacin 50 MG capsule  · omeprazole 40 MG capsule         Signing Physician:  Abraham Flores MD

## 2018-09-13 NOTE — TELEPHONE ENCOUNTER
Reason for Disposition   Nursing judgment   Patient wants to be seen    Protocols used: ST NO PROTOCOL CALL: SICK ADULT-A-OH, ST BREATHING DIFFICULTY-A-OH    Mr. Carbajal state he is pain with taking a deep breath on the left side. He states radiates to back. He states he is experiencing relief with antacids.

## 2018-09-14 ENCOUNTER — TELEPHONE (OUTPATIENT)
Dept: INTERNAL MEDICINE | Facility: CLINIC | Age: 58
End: 2018-09-14

## 2018-09-14 ENCOUNTER — TELEPHONE (OUTPATIENT)
Dept: FAMILY MEDICINE | Facility: CLINIC | Age: 58
End: 2018-09-14

## 2018-09-14 NOTE — TELEPHONE ENCOUNTER
----- Message from Vania Arthur sent at 9/14/2018 10:19 AM CDT -----  Contact: self/[phone]  Pt is calling to speak with the nurse. Pt states that he had x-rays yesterday and he's trying t get his results because he's having problems. Please advise.      Thanks

## 2018-09-14 NOTE — TELEPHONE ENCOUNTER
Patient informed to contact the doctor that ordered the test to get results.  Provided patient with the phone number to internal [facility]

## 2018-09-14 NOTE — TELEPHONE ENCOUNTER
----- Message from Jerrica Cleveland sent at 9/14/2018 10:44 AM CDT -----  Contact: self/[phone]  Pt called in regards to getting his xray results that was done on yesterday.      Please advise

## 2018-09-19 ENCOUNTER — OFFICE VISIT (OUTPATIENT)
Dept: FAMILY MEDICINE | Facility: CLINIC | Age: 58
End: 2018-09-19
Payer: COMMERCIAL

## 2018-09-19 ENCOUNTER — NURSE TRIAGE (OUTPATIENT)
Dept: ADMINISTRATIVE | Facility: CLINIC | Age: 58
End: 2018-09-19

## 2018-09-19 VITALS
BODY MASS INDEX: 42.66 KG/M2 | TEMPERATURE: 98 F | HEIGHT: 72 IN | DIASTOLIC BLOOD PRESSURE: 60 MMHG | WEIGHT: 315 LBS | SYSTOLIC BLOOD PRESSURE: 124 MMHG | RESPIRATION RATE: 20 BRPM

## 2018-09-19 DIAGNOSIS — R10.9 ABDOMINAL PAIN, UNSPECIFIED ABDOMINAL LOCATION: ICD-10-CM

## 2018-09-19 DIAGNOSIS — R10.9 FLANK PAIN: ICD-10-CM

## 2018-09-19 DIAGNOSIS — R07.9 CHEST PAIN, UNSPECIFIED TYPE: ICD-10-CM

## 2018-09-19 DIAGNOSIS — R09.1 PLEURISY: Primary | ICD-10-CM

## 2018-09-19 DIAGNOSIS — M10.9 GOUT, UNSPECIFIED CAUSE, UNSPECIFIED CHRONICITY, UNSPECIFIED SITE: ICD-10-CM

## 2018-09-19 LAB
BILIRUB SERPL-MCNC: NORMAL MG/DL
BLOOD URINE, POC: NORMAL
COLOR, POC UA: YELLOW
GLUCOSE UR QL STRIP: NORMAL
KETONES UR QL STRIP: NORMAL
LEUKOCYTE ESTERASE URINE, POC: NORMAL
NITRITE, POC UA: NORMAL
PH, POC UA: 5
PROTEIN, POC: NORMAL
SPECIFIC GRAVITY, POC UA: 1.01
UROBILINOGEN, POC UA: NORMAL

## 2018-09-19 PROCEDURE — 93005 ELECTROCARDIOGRAM TRACING: CPT | Mod: S$GLB,,, | Performed by: FAMILY MEDICINE

## 2018-09-19 PROCEDURE — 93010 ELECTROCARDIOGRAM REPORT: CPT | Mod: S$GLB,,, | Performed by: INTERNAL MEDICINE

## 2018-09-19 PROCEDURE — 3074F SYST BP LT 130 MM HG: CPT | Mod: CPTII,S$GLB,, | Performed by: FAMILY MEDICINE

## 2018-09-19 PROCEDURE — 3078F DIAST BP <80 MM HG: CPT | Mod: CPTII,S$GLB,, | Performed by: FAMILY MEDICINE

## 2018-09-19 PROCEDURE — 99999 PR PBB SHADOW E&M-EST. PATIENT-LVL IV: CPT | Mod: PBBFAC,,, | Performed by: FAMILY MEDICINE

## 2018-09-19 PROCEDURE — 3008F BODY MASS INDEX DOCD: CPT | Mod: CPTII,S$GLB,, | Performed by: FAMILY MEDICINE

## 2018-09-19 PROCEDURE — 99214 OFFICE O/P EST MOD 30 MIN: CPT | Mod: 25,S$GLB,, | Performed by: FAMILY MEDICINE

## 2018-09-19 PROCEDURE — 81002 URINALYSIS NONAUTO W/O SCOPE: CPT | Mod: S$GLB,,, | Performed by: FAMILY MEDICINE

## 2018-09-19 RX ORDER — MELOXICAM 15 MG/1
15 TABLET ORAL DAILY
Qty: 60 TABLET | Refills: 1 | Status: SHIPPED | OUTPATIENT
Start: 2018-09-19 | End: 2018-12-26 | Stop reason: SDUPTHER

## 2018-09-19 RX ORDER — HYDROCODONE BITARTRATE AND ACETAMINOPHEN 5; 325 MG/1; MG/1
1 TABLET ORAL EVERY 6 HOURS PRN
Qty: 21 TABLET | Refills: 0 | Status: SHIPPED | OUTPATIENT
Start: 2018-09-19 | End: 2018-12-26

## 2018-09-19 RX ORDER — HYOSCYAMINE SULFATE 0.125 MG
125 TABLET ORAL EVERY 6 HOURS PRN
Qty: 40 TABLET | Refills: 2 | Status: SHIPPED | OUTPATIENT
Start: 2018-09-19 | End: 2020-01-21 | Stop reason: SDUPTHER
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
## 2018-09-19 NOTE — PROGRESS NOTES
Subjective:       Patient ID: Reagan Carbajal is a 58 y.o. male.    Chief Complaint: Back Pain (mid-right region) and Gout (right foot)  worse with deep breathe. Nausea and belching sometimes after eating  HPI see above  Review of Systems   Constitutional: Negative.    HENT: Negative.    Eyes: Negative.    Respiratory: Negative.    Cardiovascular: Positive for chest pain.   Gastrointestinal: Positive for abdominal pain and nausea.   Endocrine: Negative.    Genitourinary: Negative.    Musculoskeletal: Positive for arthralgias, back pain and myalgias.   Skin: Negative.    Allergic/Immunologic: Negative.    Neurological: Negative.    Psychiatric/Behavioral: Positive for sleep disturbance.       Objective:      Physical Exam   Constitutional: He is oriented to person, place, and time. He appears well-developed and well-nourished. He appears distressed.   HENT:   Head: Normocephalic and atraumatic.   Right Ear: External ear normal.   Left Ear: External ear normal.   Nose: Nose normal.   Mouth/Throat: Oropharynx is clear and moist.   Eyes: Conjunctivae and EOM are normal. Pupils are equal, round, and reactive to light.   Cardiovascular: Normal rate, regular rhythm, normal heart sounds and intact distal pulses.   Pulmonary/Chest: Effort normal and breath sounds normal. No respiratory distress.   Abdominal: Soft. Bowel sounds are normal. He exhibits distension. He exhibits no mass. There is tenderness. There is no rebound and no guarding. No hernia.   Musculoskeletal: Normal range of motion.   Neurological: He is alert and oriented to person, place, and time. He displays normal reflexes. No cranial nerve deficit or sensory deficit. He exhibits normal muscle tone. Coordination normal.   Skin: Skin is warm and dry. No rash noted. No erythema. No pallor.   Psychiatric: He has a normal mood and affect. His behavior is normal. Judgment and thought content normal.   Nursing note and vitals reviewed.      Assessment:       1.  Pleurisy    2. Gout, unspecified cause, unspecified chronicity, unspecified site    3. Chest pain, unspecified type    4. Flank pain    5. Abdominal pain, unspecified abdominal location        Plan:         today's orders:   CBC auto differential         Comprehensive metabolic panel         CT Renal Stone Study ABD Pelvis WO         Exercise stress echo         IN OFFICE EKG 12-LEAD (to Muse)         Lipid panel         POCT URINE DIPSTICK WITHOUT MICROSCOPE         TSH         Uric acid         US Abdomen Complete         X-Ray Abdomen AP 1 View          Will contact pt with results when available.    See med card dated 9-19-18   ALPRAZolam (XANAX) 1 MG tablet         furosemide (LASIX) 20 MG tablet         HYDROcodone-acetaminophen (NORCO) 5-325 mg per tablet 1 tablet, Every 6 hours PRN        hyoscyamine (ANASPAZ,LEVSIN) 0.125 mg Tab 125 mcg, Every 6 hours PRN        indomethacin (INDOCIN) 50 MG capsule 50 mg, 3 times daily PRN        lisinopril (PRINIVIL,ZESTRIL) 40 MG tablet         lovastatin (MEVACOR) 40 MG tablet         meloxicam (MOBIC) 15 MG tablet 15 mg, Daily        omeprazole (PRILOSEC) 40 MG capsule 40 mg, Daily

## 2018-09-20 ENCOUNTER — HOSPITAL ENCOUNTER (OUTPATIENT)
Dept: RADIOLOGY | Facility: HOSPITAL | Age: 58
Discharge: HOME | End: 2018-09-20
Attending: FAMILY MEDICINE
Payer: COMMERCIAL

## 2018-09-20 DIAGNOSIS — R10.9 FLANK PAIN: ICD-10-CM

## 2018-09-20 DIAGNOSIS — R10.9 ABDOMINAL PAIN, UNSPECIFIED ABDOMINAL LOCATION: ICD-10-CM

## 2018-09-20 PROCEDURE — 76700 US EXAM ABDOM COMPLETE: CPT | Mod: TC

## 2018-09-20 PROCEDURE — 74176 CT ABD & PELVIS W/O CONTRAST: CPT | Mod: 26,,, | Performed by: RADIOLOGY

## 2018-09-20 PROCEDURE — 74018 RADEX ABDOMEN 1 VIEW: CPT | Mod: 26,,, | Performed by: RADIOLOGY

## 2018-09-20 PROCEDURE — 74018 RADEX ABDOMEN 1 VIEW: CPT | Mod: TC

## 2018-09-20 PROCEDURE — 76700 US EXAM ABDOM COMPLETE: CPT | Mod: 26,,, | Performed by: RADIOLOGY

## 2018-09-20 PROCEDURE — 74176 CT ABD & PELVIS W/O CONTRAST: CPT | Mod: TC

## 2018-09-24 ENCOUNTER — TELEPHONE (OUTPATIENT)
Dept: FAMILY MEDICINE | Facility: CLINIC | Age: 58
End: 2018-09-24

## 2018-09-24 NOTE — TELEPHONE ENCOUNTER
----- Message from Rigoberto Hua sent at 9/24/2018  9:34 AM CDT -----  Contact: Self [phone]  Patient would like to get test results    Name of test (lab, mammo, etc.):   Testing & Xray    Date of test:  9/20    Ordering provider: Dr. Boogie    Where was the test performed:  [facility]     Would you prefer a response via [employer]?:  no    Comments:  Pt will like to speak to the nurse.

## 2018-09-24 NOTE — TELEPHONE ENCOUNTER
Spoke to pt regarding ultrasound results he is doing better.Pt has labs to be done. Someone will call him to schedule him at [facility]

## 2018-09-24 NOTE — TELEPHONE ENCOUNTER
Spoke with patient informed him of his xray and CT results released to her [facility].   Patient would also like to get result of the US.

## 2018-09-28 ENCOUNTER — HOSPITAL ENCOUNTER (EMERGENCY)
Facility: HOSPITAL | Age: 58
Discharge: HOME | End: 2018-09-28
Attending: EMERGENCY MEDICINE
Payer: COMMERCIAL

## 2018-09-28 ENCOUNTER — OFFICE VISIT (OUTPATIENT)
Dept: URGENT CARE | Facility: CLINIC | Age: 58
End: 2018-09-28
Payer: COMMERCIAL

## 2018-09-28 VITALS
BODY MASS INDEX: 42.66 KG/M2 | SYSTOLIC BLOOD PRESSURE: 166 MMHG | RESPIRATION RATE: 18 BRPM | DIASTOLIC BLOOD PRESSURE: 85 MMHG | HEIGHT: 72 IN | HEART RATE: 69 BPM | TEMPERATURE: 99 F | OXYGEN SATURATION: 98 % | WEIGHT: 315 LBS

## 2018-09-28 VITALS
SYSTOLIC BLOOD PRESSURE: 135 MMHG | WEIGHT: 315 LBS | OXYGEN SATURATION: 97 % | HEART RATE: 56 BPM | RESPIRATION RATE: 20 BRPM | TEMPERATURE: 99 F | HEIGHT: 72 IN | BODY MASS INDEX: 42.66 KG/M2 | DIASTOLIC BLOOD PRESSURE: 63 MMHG

## 2018-09-28 DIAGNOSIS — R94.31 ABNORMAL EKG: ICD-10-CM

## 2018-09-28 DIAGNOSIS — R55 NEAR SYNCOPE: ICD-10-CM

## 2018-09-28 DIAGNOSIS — R06.02 SHORTNESS OF BREATH: ICD-10-CM

## 2018-09-28 DIAGNOSIS — R06.02 SHORTNESS OF BREATH: Primary | ICD-10-CM

## 2018-09-28 DIAGNOSIS — R00.1 BRADYCARDIA: ICD-10-CM

## 2018-09-28 DIAGNOSIS — E66.01 MORBID OBESITY: ICD-10-CM

## 2018-09-28 DIAGNOSIS — R07.9 CHEST PAIN, UNSPECIFIED TYPE: Primary | ICD-10-CM

## 2018-09-28 LAB
ALBUMIN SERPL BCP-MCNC: 3.8 G/DL
ALP SERPL-CCNC: 83 U/L
ALT SERPL W/O P-5'-P-CCNC: 24 U/L
ANION GAP SERPL CALC-SCNC: 9 MMOL/L
AST SERPL-CCNC: 28 U/L
BASOPHILS # BLD AUTO: 0.03 K/UL
BASOPHILS NFR BLD: 0.4 %
BILIRUB SERPL-MCNC: 0.6 MG/DL
BNP SERPL-MCNC: 31 PG/ML
BUN SERPL-MCNC: 14 MG/DL
CALCIUM SERPL-MCNC: 9.4 MG/DL
CHLORIDE SERPL-SCNC: 109 MMOL/L
CO2 SERPL-SCNC: 21 MMOL/L
CREAT SERPL-MCNC: 0.9 MG/DL
D DIMER PPP IA.FEU-MCNC: 0.58 MG/L FEU
DIFFERENTIAL METHOD: ABNORMAL
EOSINOPHIL # BLD AUTO: 0.2 K/UL
EOSINOPHIL NFR BLD: 2.2 %
ERYTHROCYTE [DISTWIDTH] IN BLOOD BY AUTOMATED COUNT: 14.2 %
EST. GFR  (AFRICAN AMERICAN): >60 ML/MIN/1.73 M^2
EST. GFR  (NON AFRICAN AMERICAN): >60 ML/MIN/1.73 M^2
GLUCOSE SERPL-MCNC: 96 MG/DL
HCT VFR BLD AUTO: 45.5 %
HGB BLD-MCNC: 15 G/DL
IMM GRANULOCYTES # BLD AUTO: 0.05 K/UL
IMM GRANULOCYTES NFR BLD AUTO: 0.6 %
LYMPHOCYTES # BLD AUTO: 1.5 K/UL
LYMPHOCYTES NFR BLD: 19.6 %
MCH RBC QN AUTO: 30 PG
MCHC RBC AUTO-ENTMCNC: 33 G/DL
MCV RBC AUTO: 91 FL
MONOCYTES # BLD AUTO: 0.6 K/UL
MONOCYTES NFR BLD: 7.9 %
NEUTROPHILS # BLD AUTO: 5.4 K/UL
NEUTROPHILS NFR BLD: 69.3 %
NRBC BLD-RTO: 0 /100 WBC
PLATELET # BLD AUTO: 193 K/UL
PMV BLD AUTO: 10.9 FL
POTASSIUM SERPL-SCNC: 4.6 MMOL/L
PROT SERPL-MCNC: 7.2 G/DL
RBC # BLD AUTO: 5 M/UL
SODIUM SERPL-SCNC: 139 MMOL/L
TROPONIN I SERPL DL<=0.01 NG/ML-MCNC: <0.006 NG/ML
TROPONIN I SERPL DL<=0.01 NG/ML-MCNC: <0.006 NG/ML
TSH SERPL DL<=0.005 MIU/L-ACNC: 0.64 UIU/ML
WBC # BLD AUTO: 7.82 K/UL

## 2018-09-28 PROCEDURE — 83880 ASSAY OF NATRIURETIC PEPTIDE: CPT

## 2018-09-28 PROCEDURE — 99214 OFFICE O/P EST MOD 30 MIN: CPT | Mod: S$GLB,,, | Performed by: NURSE PRACTITIONER

## 2018-09-28 PROCEDURE — 80053 COMPREHEN METABOLIC PANEL: CPT

## 2018-09-28 PROCEDURE — 99285 EMERGENCY DEPT VISIT HI MDM: CPT | Mod: ,,, | Performed by: EMERGENCY MEDICINE

## 2018-09-28 PROCEDURE — 84484 ASSAY OF TROPONIN QUANT: CPT | Mod: 91

## 2018-09-28 PROCEDURE — 84484 ASSAY OF TROPONIN QUANT: CPT

## 2018-09-28 PROCEDURE — 25000003 PHARM REV CODE 250: Performed by: PHYSICIAN ASSISTANT

## 2018-09-28 PROCEDURE — 93010 ELECTROCARDIOGRAM REPORT: CPT | Mod: ,,, | Performed by: INTERNAL MEDICINE

## 2018-09-28 PROCEDURE — 25500020 PHARM REV CODE 255: Performed by: EMERGENCY MEDICINE

## 2018-09-28 PROCEDURE — 99284 EMERGENCY DEPT VISIT MOD MDM: CPT | Mod: 25

## 2018-09-28 PROCEDURE — 3077F SYST BP >= 140 MM HG: CPT | Mod: CPTII,S$GLB,, | Performed by: NURSE PRACTITIONER

## 2018-09-28 PROCEDURE — 85025 COMPLETE CBC W/AUTO DIFF WBC: CPT

## 2018-09-28 PROCEDURE — 84443 ASSAY THYROID STIM HORMONE: CPT

## 2018-09-28 PROCEDURE — 93005 ELECTROCARDIOGRAM TRACING: CPT

## 2018-09-28 PROCEDURE — 85379 FIBRIN DEGRADATION QUANT: CPT

## 2018-09-28 PROCEDURE — 3079F DIAST BP 80-89 MM HG: CPT | Mod: CPTII,S$GLB,, | Performed by: NURSE PRACTITIONER

## 2018-09-28 PROCEDURE — 3008F BODY MASS INDEX DOCD: CPT | Mod: CPTII,S$GLB,, | Performed by: NURSE PRACTITIONER

## 2018-09-28 RX ORDER — ASPIRIN 325 MG
325 TABLET ORAL
Status: COMPLETED | OUTPATIENT
Start: 2018-09-28 | End: 2018-09-28

## 2018-09-28 RX ADMIN — IOHEXOL 100 ML: 350 INJECTION, SOLUTION INTRAVENOUS at 01:09

## 2018-09-28 RX ADMIN — ASPIRIN 325 MG ORAL TABLET 325 MG: 325 PILL ORAL at 10:09

## 2018-09-28 NOTE — PROGRESS NOTES
Subjective:       Patient ID: Reagan Carbajal is a 58 y.o. male.    Vitals:  height is 6' (1.829 m) and weight is 152 kg (335 lb) (abnormal). His temperature is 98.7 °F (37.1 °C). His blood pressure is 166/85 (abnormal) and his pulse is 69. His respiration is 18 and oxygen saturation is 98%.     Chief Complaint: Shortness of Breath    Patient presents to clinic with his wife and daughter for c/o shortness of breath while driving this morning and feeling like he was going to pass out with a soreness in his chest.  He repeatedly states that he is not having chest pain but that maybe his chest feels sore.  He does not feel short of breath at present, he states that it happens every 20-30 minutes and subsides.  He does not become diaphoretic.  He sleeps at night with a CPAP and reports that he hasn't been sleeping well recently.  He often times has to sleep in a recliner with orthopnea, but this is chronic.  No new swelling or pedal edema reported.  He is being worked up outpatient by his PCP at present with recent chest xray, EKG, US of abdomen, and renal CT.  He is has an order placed for a stress test, he has not yet had this test.          Shortness of Breath   This is a new problem. The current episode started today. Associated symptoms include orthopnea (chronic, no changes). Pertinent negatives include no abdominal pain, chest pain, fever, headaches, leg swelling, rash, sore throat, syncope or vomiting. Nothing aggravates the symptoms. He has tried nothing for the symptoms. The treatment provided no relief.     Review of Systems   Constitution: Negative for chills and fever.   HENT: Negative for sore throat.    Eyes: Negative for blurred vision.   Cardiovascular: Positive for irregular heartbeat (chronic, occasional, no changes), near-syncope and orthopnea (chronic, no changes). Negative for chest pain, dyspnea on exertion, leg swelling, palpitations and syncope.        Chest discomfort   Respiratory: Positive for  shortness of breath. Negative for cough.    Skin: Negative for rash.   Musculoskeletal: Negative for back pain and joint pain.   Gastrointestinal: Negative for abdominal pain, diarrhea, nausea and vomiting.   Neurological: Positive for light-headedness. Negative for dizziness and headaches.   Psychiatric/Behavioral: The patient is not nervous/anxious.        Objective:      Physical Exam   Constitutional: He is oriented to person, place, and time. He appears well-developed and well-nourished. He is cooperative.  Non-toxic appearance. He does not appear ill. No distress.   Obese   HENT:   Head: Normocephalic and atraumatic.   Right Ear: Hearing, tympanic membrane, external ear and ear canal normal.   Left Ear: Hearing, tympanic membrane, external ear and ear canal normal.   Nose: Nose normal. No mucosal edema, rhinorrhea or nasal deformity. No epistaxis. Right sinus exhibits no maxillary sinus tenderness and no frontal sinus tenderness. Left sinus exhibits no maxillary sinus tenderness and no frontal sinus tenderness.   Mouth/Throat: Uvula is midline, oropharynx is clear and moist and mucous membranes are normal. No trismus in the jaw. Normal dentition. No uvula swelling. No posterior oropharyngeal erythema.   Eyes: Conjunctivae and lids are normal. Right eye exhibits no discharge. Left eye exhibits no discharge. No scleral icterus.   Sclera clear bilat   Neck: Trachea normal, normal range of motion, full passive range of motion without pain and phonation normal. Neck supple.   Cardiovascular: Normal rate, regular rhythm, normal heart sounds, intact distal pulses and normal pulses.   No pedal edema   Pulmonary/Chest: Effort normal and breath sounds normal. No respiratory distress. He has no wheezes.   Abdominal: Soft. Normal appearance and bowel sounds are normal. He exhibits no distension, no pulsatile midline mass and no mass. There is no tenderness.   Musculoskeletal: Normal range of motion. He exhibits no edema  or deformity.   Neurological: He is alert and oriented to person, place, and time. He exhibits normal muscle tone. Coordination normal.   Skin: Skin is warm, dry and intact. He is not diaphoretic.   Pale, non diaphoretic   Psychiatric: He has a normal mood and affect. His speech is normal and behavior is normal. Judgment and thought content normal. Cognition and memory are normal.   Nursing note and vitals reviewed.      EKG: abnormal EKG with sinus bradycardia HR of 28 with sinus arrhythmia.  Assessment:       1. Chest pain, unspecified type    2. Shortness of breath    3. Near syncope        Plan:       Case and EKG reviewed with Dr. Drake who agrees to sending patient to the ER for further eval to rule out P.E. And cardiac cause of shortness of breath with chest soreness and feeling like he's going to pass out.    Chest pain, unspecified type  -     IN OFFICE EKG 12-LEAD (to [place])  -     Refer to Emergency Dept.    Shortness of breath  -     Refer to Emergency Dept.    Near syncope  -     Refer to Emergency Dept.    Patient to ER ([facility]) by private vehicle, asymptomatic at this time, with family driving him.

## 2018-09-28 NOTE — ED NOTES
"The patient came to the ER today with complaints of chest pain, right sided,that goes into his back. Also c/o feeling "short winded" and like he can't get a deep breath. This AM, the pt felt like he was "going to black out". Pt was referred to ER after being seen at Urgent Care. EKG done at Urgent Care showed sinus yayo.  "

## 2018-09-28 NOTE — ED PROVIDER NOTES
Encounter Date: 9/28/2018       History     Chief Complaint   Patient presents with    Abnormal ECG     sent from , chest pain and sob     This is a 58 year old male with a PMH of HTN, HLP, morbid obesity, SHARAN who presents to the ED wia chief complaint of shortness of breath. Patient reports a history of right sided sharp pleuritic chest pain that has been going on for several weeks after he changed the transmission in his car. He was seen by his PCP and treated with a PPI. He also received indomethacin at that visit for gouty arthritis. He reports improvement in his pleuritic pain with this. Patient notes continued shortness of breath and STAPLETON. He is sleeping in a recliner, although denies SOB when lying flat. He does use CPAP at night.     Patient was driving his granddaughter to school this morning around 0730 when he developed shortness of breath, palpitations, and near syncope. States he had to pull his car over, out of concern for his safety. The episode resolved spontaneously within 5 min. He was seen in urgent care and referred to the ED for further evaluation. EKG in urgent care sinus bradycardia. Patient reports two additional episodes since the onset, each lasting several minutes. Denies fever, chills, URI symptoms, active chest pain, nausea/vomiting, diaphoresis, peripheral edema, or additional complaints. No personal cardiac hx - referral placed for stress test which has not yet been completed. Family hx of paternal CAD.          Review of patient's allergies indicates:   Allergen Reactions    Tramadol      Nausea      Triamterene      Past Medical History:   Diagnosis Date    Anxiety     Colon polyp     CPAP (continuous positive airway pressure) dependence     does not use    GERD (gastroesophageal reflux disease)     Gout 9/13/2018    HTN (hypertension) 11/12/2012    Hyperlipidemia     Insomnia with sleep apnea     Obesity, morbid, BMI 40.0-49.9 6/12/2014     Past Surgical History:    Procedure Laterality Date    COLONOSCOPY N/A 5/3/2016    Procedure: COLONOSCOPY;  Surgeon: Chance Glynn MD;  Location: [facility] (4TH FLR);  Service: Endoscopy;  Laterality: N/A;    COLONOSCOPY N/A 5/3/2016    Performed by Chance Glynn MD at [facility] (4TH FLR)    COLONOSCOPY W/ POLYPECTOMY       Family History   Problem Relation Age of Onset    Heart disease Mother     Hyperlipidemia Mother     Hypertension Mother     Lymphoma Mother     Hypertension Father     Hepatitis Brother      Social History     Tobacco Use    Smoking status: Former Smoker     Types: Cigarettes, Cigars     Last attempt to quit: 5/3/1993     Years since quittin.4    Smokeless tobacco: Never Used   Substance Use Topics    Alcohol use: No     Alcohol/week: 0.0 oz    Drug use: No     Review of Systems   Constitutional: Negative for chills and fever.   HENT: Negative for sore throat.    Respiratory: Positive for shortness of breath.    Cardiovascular: Positive for chest pain and palpitations.   Gastrointestinal: Negative for nausea and vomiting.   Genitourinary: Negative for dysuria.   Musculoskeletal: Negative for back pain.   Skin: Negative for rash.   Neurological: Negative for weakness.   Hematological: Does not bruise/bleed easily.       Physical Exam     Initial Vitals [18 1004]   BP Pulse Resp Temp SpO2   (!) 196/85 (!) 45 20 98.7 °F (37.1 °C) 98 %      MAP       --         Physical Exam    Constitutional: He appears well-developed and well-nourished. He is Obese . No distress.   HENT:   Head: Atraumatic.   Eyes: Conjunctivae and EOM are normal. Pupils are equal, round, and reactive to light.   Neck: No JVD present.   Cardiovascular: Regular rhythm, normal heart sounds and intact distal pulses. Bradycardia present.    No peripheral edema.   Pulmonary/Chest: Breath sounds normal. No respiratory distress. He has no wheezes. He has no rhonchi. He has no rales.   Abdominal: Soft. Bowel sounds are normal. There is  tenderness in the epigastric area. There is no rigidity, no rebound and no guarding.   Neurological: He is alert and oriented to person, place, and time. GCS score is 15. GCS eye subscore is 4. GCS verbal subscore is 5. GCS motor subscore is 6.   Skin: Skin is warm and dry. No rash noted.         ED Course   Procedures  Labs Reviewed   CBC W/ AUTO DIFFERENTIAL - Abnormal; Notable for the following components:       Result Value    Immature Granulocytes 0.6 (*)     Immature Grans (Abs) 0.05 (*)     All other components within normal limits    Narrative:     ADD ON DDIMER ORDER #[id number] PER DR EILEEN HERNANDEZ  09/28/2018    11:26    COMPREHENSIVE METABOLIC PANEL - Abnormal; Notable for the following components:    CO2 21 (*)     All other components within normal limits   D DIMER, QUANTITATIVE - Abnormal; Notable for the following components:    D-Dimer 0.58 (*)     All other components within normal limits    Narrative:     ADD ON DDIMER ORDER #[id number] PER DR EILEEN HERNANDEZ  09/28/2018    11:26    TROPONIN I   B-TYPE NATRIURETIC PEPTIDE   D DIMER, QUANTITATIVE   TSH   TSH    Narrative:     ADD ON DDIMER ORDER #[id number] PER DR EILEEN HERNANDEZ  09/28/2018    11:26   ADD ON TSH ORDER #[id number] PER DR EILEEN HERNANDEZ  09/28/2018    12:36    TROPONIN I     EKG Readings: (Independently Interpreted)   Sinus bradycardia rate 47.  Normal intervals.  No ischemic changes.  No STEMI.       Imaging Results          CTA Chest Non-Coronary (PE Study) (Final result)  Result time 09/28/18 13:20:02    Final result by Michael Estes MD (09/28/18 13:20:02)                 Impression:      1. No acute cardiopulmonary process identified.  Specifically, no convincing evidence of pulmonary thromboembolism or pulmonary infarction.  2. Mild cardiomegaly without evidence of failure.  3. Mild systemic and coronary atherosclerosis.  4. Left renal upper pole partially imaged 6.8 cm exophytic low-attenuation mass not completely  characterized on today's exam but has been deemed simple on recent CT renal stone study and abdominal ultrasound of 09/20/2018.  5. Few additional findings as above.      Electronically signed by: Michael Estes MD  Date:    09/28/2018  Time:    13:20             Narrative:    EXAMINATION:  CTA CHEST NON CORONARY    CLINICAL HISTORY:  Chest pain, acute, PE suspected, intermed prob, positive D-dimer;    TECHNIQUE:  Low dose axial images, sagittal and coronal reformations were obtained from the thoracic inlet to the lung bases following the IV administration of 100 mL of Omnipaque 350.  Contrast timing was optimized to evaluate the pulmonary arteries.  MIP images were performed.    COMPARISON:  Chest radiograph same day, CT renal stone study 09/20/2018 and CT thorax 06/05/2010    FINDINGS:  Beam hardening with streak artifact from overlying monitoring leads and also increased image noise from body habitus somewhat limits evaluation.    Timing of contrast bolus is adequate.  No evidence of pulmonary thromboembolism or pulmonary infarction.  Pulmonary arteries distribute normally.  There are 4 pulmonary veins.  Three vessel left-sided arch.  Mild scattered calcific atherosclerosis of the thoracic aorta and its proximal branch vessels without evidence of aneurysm.  Heart is mildly enlarged without significant pericardial fluid.  Mild scattered coronary arterial calcifications noted.  Esophagus is normal in course, caliber and contour.  No mediastinal, hilar or axillary lymphadenopathy.    Central airways are relatively midline and patent.  The lungs are symmetrically normal to slightly hypoinflated with minimal dependent atelectasis, but no focal consolidation, pleural effusion or pneumothorax.  No suspicious pulmonary nodules.    Structures at the base of the neck are within normal limits.  Included extrathoracic soft tissues are within normal limits.  Included upper abdomen is significant for a partially imaged 6.8 cm  rounded low-attenuation mass arising from the left renal upper pole and hepatic steatosis.    Included osseous structures show multilevel flowing anterior marginal osteophytes of the majority of the thoracic spine consistent with DISH and mild degenerative change without acute or destructive process seen.                               X-Ray Chest PA And Lateral (Final result)  Result time 09/28/18 10:56:47    Final result by Ermias Pierce MD (09/28/18 10:56:47)                 Impression:      See above      Electronically signed by: Ermias Pierce MD  Date:    09/28/2018  Time:    10:56             Narrative:    EXAMINATION:  XR CHEST PA AND LATERAL    CLINICAL HISTORY:  Chest Pain;    TECHNIQUE:  PA and lateral views of the chest were performed.    COMPARISON:  None 09/13/2018    FINDINGS:  Mild cardiomegaly.  No significant airspace consolidation or pleural effusion identified                                    Additional MDM:   PERC Rule:   Age is greater than or equal to 50 = 1.0  Heart Rate is greater than or equal to 100 = 0.0  SaO2 on room air < 95% = 0.0  Unilateral leg swelling = 0.0  Hemoptysis = 0.0  Recent surgery or trauma = 0.0  Prior PE or DVT =  0.0  Hormone use = 0.00  PERC Score = 1  Heart Score:    History:          Slightly suspicious.  ECG:             Normal  Age:               45-65 years  Risk factors: >= 3 risk factors or history of atherosclerotic disease  Troponin:       Less than or equal to normal limit  Final Score: 3        APC / Resident Notes:   58-year-old male presenting with dyspnea on exertion and an episode of near-syncope this morning.  On exam he is afebrile and nontoxic.  Heart rate is bradycardic with a regular rhythm. Lungs are clear without rales.  Abdomen is soft with mild epigastric tenderness, no rebound or guarding.  No peripheral edema.    DDx includes but is not limited to ACS, CHF, gastritis, PUD, reflux, musculoskeletal.    CBC and CMP are grossly unremarkable. TSH  wnl.  Troponin negative x 2, BNP within normal limits  D dimer elevated 0.58    CTA negative for PE. Incidental note of cardiomegaly, mild systemic and coronary atherosclerosis, and a left renal mass that is not completely visualized on today's exam but has been deemed simple on recent CT renal stone study and abdominal ultrasound 9/20/18. I discussed these findings with the patient. He was asymptomatic throughout his ED course. I have recommended that he follow up with cardiology and schedule a stress test. Patient and family verbalized understanding and agree with plan and course of treatment. He is stable for discharge. Return to ED precautions discussed I discussed the care of this patient with my supervising MD.          Attending Attestation:     Physician Attestation Statement for NP/PA:   I have conducted a face to face encounter with this patient in addition to the NP/PA, due to Medical Complexity    Other NP/PA Attestation Additions:    History of Present Illness: 59 y/o male here w/ bradycardia and fatigue as well as chest pressure while driving this morning. Has symptoms several months. Large weight gain over past 6 months. Felt better before he gained weight.   Physical Exam: Morbidly obese male sitting in bed in NAD. Bradycardic. Normal heart and lung sounds. Overall well appearing.   Medical Decision Making: Vitals normal except for bradycardia.  Afebrile.  Patient with chronic weight gain, bradycardia and fatigue.  Thorough workup significant for mild cardiomegaly only.  Thyroid function normal.  No evidence of PE on CTA.  Negative ACS workup.  Heart score 3; supports discharged home. Advised patient to lose weight as this may help symptoms. Referral placed for cardiology evaluation. Stable for discharge.                     Clinical Impression:   The primary encounter diagnosis was Shortness of breath. Diagnoses of Abnormal EKG, Morbid obesity, and Bradycardia were also pertinent to this  visit.      Disposition:   Disposition: Discharged  Condition: Stable                        Jessica Boothe PA-C  09/28/18 [id number]       Ronnie Moreau MD  09/29/18 1221

## 2018-12-04 DIAGNOSIS — E78.5 HYPERLIPIDEMIA, UNSPECIFIED HYPERLIPIDEMIA TYPE: ICD-10-CM

## 2018-12-04 RX ORDER — LOVASTATIN 40 MG/1
TABLET ORAL
Qty: 90 TABLET | Refills: 1 | OUTPATIENT
Start: 2018-12-04

## 2018-12-04 NOTE — TELEPHONE ENCOUNTER
Spoke with patient informed him of your message below, states he will contact our office back to schedule.

## 2018-12-04 NOTE — TELEPHONE ENCOUNTER
No lipid panel in over a year no physical in almost 2 years.  Needs blood work to continue on this med, and schedule epp

## 2018-12-26 ENCOUNTER — OFFICE VISIT (OUTPATIENT)
Dept: FAMILY MEDICINE | Facility: CLINIC | Age: 58
End: 2018-12-26
Payer: COMMERCIAL

## 2018-12-26 VITALS
BODY MASS INDEX: 42.66 KG/M2 | DIASTOLIC BLOOD PRESSURE: 64 MMHG | WEIGHT: 315 LBS | SYSTOLIC BLOOD PRESSURE: 138 MMHG | HEART RATE: 64 BPM | OXYGEN SATURATION: 97 % | HEIGHT: 72 IN | TEMPERATURE: 99 F

## 2018-12-26 DIAGNOSIS — J98.01 BRONCHOSPASM: ICD-10-CM

## 2018-12-26 DIAGNOSIS — J06.9 VIRAL URI WITH COUGH: Primary | ICD-10-CM

## 2018-12-26 DIAGNOSIS — M10.9 GOUT, UNSPECIFIED CAUSE, UNSPECIFIED CHRONICITY, UNSPECIFIED SITE: ICD-10-CM

## 2018-12-26 DIAGNOSIS — E66.01 OBESITY, MORBID, BMI 40.0-49.9: Chronic | ICD-10-CM

## 2018-12-26 DIAGNOSIS — I10 ESSENTIAL HYPERTENSION: ICD-10-CM

## 2018-12-26 PROCEDURE — 3078F DIAST BP <80 MM HG: CPT | Mod: CPTII,S$GLB,, | Performed by: NURSE PRACTITIONER

## 2018-12-26 PROCEDURE — 3008F BODY MASS INDEX DOCD: CPT | Mod: CPTII,S$GLB,, | Performed by: NURSE PRACTITIONER

## 2018-12-26 PROCEDURE — 99999 PR PBB SHADOW E&M-EST. PATIENT-LVL IV: CPT | Mod: PBBFAC,,, | Performed by: NURSE PRACTITIONER

## 2018-12-26 PROCEDURE — 3075F SYST BP GE 130 - 139MM HG: CPT | Mod: CPTII,S$GLB,, | Performed by: NURSE PRACTITIONER

## 2018-12-26 PROCEDURE — 99214 OFFICE O/P EST MOD 30 MIN: CPT | Mod: S$GLB,,, | Performed by: NURSE PRACTITIONER

## 2018-12-26 RX ORDER — IPRATROPIUM BROMIDE 21 UG/1
2 SPRAY, METERED NASAL 3 TIMES DAILY
Qty: 30 ML | Refills: 0 | Status: SHIPPED | OUTPATIENT
Start: 2018-12-26 | End: 2019-01-05

## 2018-12-26 RX ORDER — PROMETHAZINE HYDROCHLORIDE AND DEXTROMETHORPHAN HYDROBROMIDE 6.25; 15 MG/5ML; MG/5ML
5 SYRUP ORAL NIGHTLY
Qty: 180 ML | Refills: 0 | Status: SHIPPED | OUTPATIENT
Start: 2018-12-26 | End: 2019-01-05

## 2018-12-26 RX ORDER — MELOXICAM 15 MG/1
15 TABLET ORAL DAILY
Qty: 60 TABLET | Refills: 1 | Status: SHIPPED | OUTPATIENT
Start: 2018-12-26 | End: 2019-03-19

## 2018-12-26 RX ORDER — METHYLPREDNISOLONE 4 MG/1
TABLET ORAL
Qty: 1 PACKAGE | Refills: 0 | Status: SHIPPED | OUTPATIENT
Start: 2018-12-26 | End: 2019-01-16

## 2018-12-26 NOTE — PATIENT INSTRUCTIONS
Viral Upper Respiratory Illness (Adult)  You have a viral upper respiratory illness (URI), which is another term for the common cold. This illness is contagious during the first few days. It is spread through the air by coughing and sneezing. It may also be spread by direct contact (touching the sick person and then touching your own eyes, nose, or mouth). Frequent handwashing will decrease risk of spread. Most viral illnesses go away within 7 to 10 days with rest and simple home remedies. Sometimes the illness may last for several weeks. Antibiotics will not kill a virus, and they are generally not prescribed for this condition.    Home care  · If symptoms are severe, rest at home for the first 2 to 3 days. When you resume activity, don't let yourself get too tired.  · Avoid being exposed to cigarette smoke (yours or others).  · You may use acetaminophen or ibuprofen to control pain and fever, unless another medicine was prescribed. (Note: If you have chronic liver or kidney disease, have ever had a stomach ulcer or gastrointestinal bleeding, or are taking blood-thinning medicines, talk with your healthcare provider before using these medicines.) Aspirin should never be given to anyone under 18 years of age who is ill with a viral infection or fever. It may cause severe liver or brain damage.  · Your appetite may be poor, so a light diet is fine. Avoid dehydration by drinking 6 to 8 glasses of fluids per day (water, soft drinks, juices, tea, or soup). Extra fluids will help loosen secretions in the nose and lungs.  · Over-the-counter cold medicines will not shorten the length of time youre sick, but they may be helpful for the following symptoms: cough, sore throat, and nasal and sinus congestion. (Note: Do not use decongestants if you have high blood pressure.)  Follow-up care  Follow up with your healthcare provider, or as advised.  When to seek medical advice  Call your healthcare provider right away if any  of these occur:  · Cough with lots of colored sputum (mucus)  · Severe headache; face, neck, or ear pain  · Difficulty swallowing due to throat pain  · Fever of 100.4°F (38°C)  Call 911, or get immediate medical care  Call emergency services right away if any of these occur:  · Chest pain, shortness of breath, wheezing, or difficulty breathing  · Coughing up blood  · Inability to swallow due to throat pain  Date Last Reviewed: 9/13/2015  © 7927-8299 [employer]. [address] [place] [address]. All rights reserved. This information is not intended as a substitute for professional medical care. Always follow your healthcare professional's instructions.

## 2018-12-26 NOTE — LETTER
December 26, 2018      [facility]  [address] [facility]  [place] [address]  Phone: [phone]  Fax: [phone]       Patient: Reagan Carbajal   YOB: 1960  Date of Visit: 12/26/2018    To Whom It May Concern:    Stella Carbajal  was at [facility] on 12/26/2018. He may return to work/school on 12/28/2018 with no restrictions. If you have any questions or concerns, or if I can be of further assistance, please do not hesitate to contact me.    Sincerely,      Loni Ritchie NP

## 2018-12-27 NOTE — PROGRESS NOTES
History of Present Illness   Reagan Carbajal is a 58 y.o. man with medical history as listed below who presents today for evaluation of URI symptoms x4 days. He complaints of nasal congestion, post nasal drip, sore throat, dry hacking cough, and wheezing. He has no fevers or chills. He has tried Aleve with no relief. He has no additional complaints and is otherwise healthy on today's visit.    Past Medical History:   Diagnosis Date    Anxiety     Colon polyp     CPAP (continuous positive airway pressure) dependence     does not use    GERD (gastroesophageal reflux disease)     Gout 2018    HTN (hypertension) 2012    Hyperlipidemia     Insomnia with sleep apnea     Obesity, morbid, BMI 40.0-49.9 2014       Past Surgical History:   Procedure Laterality Date    COLONOSCOPY N/A 5/3/2016    Performed by Chance Gl[facility]yn MD at [facility] ([facility]    COLONOSCOPY W/ POLYPECTOMY         Social History     Socioeconomic History    Marital status:      Spouse name: None    Number of children: None    Years of education: None    Highest education level: None   Social Needs    Financial resource strain: None    Food insecurity - worry: None    Food insecurity - inability: None    Transportation needs - medical: None    Transportation needs - non-medical: None   Occupational History     Employer: [employer]   Tobacco Use    Smoking status: Former Smoker     Types: Cigarettes, Cigars     Last attempt to quit: 5/3/1993     Years since quittin.6    Smokeless tobacco: Never Used   Substance and Sexual Activity    Alcohol use: No     Alcohol/week: 0.0 oz    Drug use: No    Sexual activity: Yes     Partners: Female     Birth control/protection: None   Other Topics Concern    None   Social History Narrative    None       Family History   Problem Relation Age of Onset    Heart disease Mother     Hyperlipidemia Mother     Hypertension Mother     Lymphoma Mother     Hypertension  Father     Hepatitis Brother        Review of Systems  Review of Systems   Constitutional: Positive for malaise/fatigue. Negative for chills and fever.   HENT: Positive for congestion and sore throat. Negative for ear discharge, ear pain and sinus pain.    Eyes: Negative for discharge and redness.   Respiratory: Positive for cough and wheezing. Negative for sputum production and shortness of breath.    Cardiovascular: Negative for chest pain, palpitations and orthopnea.   Gastrointestinal: Negative for nausea and vomiting.     A complete review of systems was otherwise negative.    Physical Exam  /64 (BP Location: Right arm, Patient Position: Sitting, BP Method: X-Large (Manual))   Pulse 64   Temp 98.5 °F (36.9 °C) (Oral)   Ht 6' (1.829 m)   Wt (!) 150.8 kg (332 lb 7.3 oz)   SpO2 97%   BMI 45.09 kg/m²   General appearance: alert, appears stated age, cooperative and no distress  Eyes: negative findings: lids and lashes normal and conjunctivae and sclerae normal  Ears: normal TM's and external ear canals both ears and bilateral middle ear effusion  Nose: clear discharge, moderate congestion, turbinates red, swollen, no sinus tenderness  Throat: lips, mucosa, and tongue normal; teeth and gums normal and moderate oropharyngeal erythema with clear post nasal drainage  Lungs: clear to auscultation bilaterally and no wheezes, rales, or rhonchi  Heart: regular rate and rhythm, S1, S2 normal, no murmur, click, rub or gallop  Lymph nodes: Cervical, supraclavicular, and axillary nodes normal.  Neurologic: Grossly normal    Assessment/Plan  Viral URI with cough  Medrol dose pack, take as directed.  Atrovent nasal spray TID.  Nighttime cough syrup PRN.  Use the rescue inhaler as needed.  Rest and stay hydrated.  Viral, antibiotics not indicated.  RTC PRN.  -     ipratropium (ATROVENT) 0.03 % nasal spray; 2 sprays by Nasal route 3 (three) times daily. for 10 days  Dispense: 30 mL; Refill: 0  -      promethazine-dextromethorphan (PROMETHAZINE-DM) 6.25-15 mg/5 mL Syrp; Take 5 mLs by mouth every evening. for 10 days  Dispense: 180 mL; Refill: 0  -     methylPREDNISolone (MEDROL DOSEPACK) 4 mg tablet; use as directed  Dispense: 1 Package; Refill: 0    Bronchospasm  As above.  -     methylPREDNISolone (MEDROL DOSEPACK) 4 mg tablet; use as directed  Dispense: 1 Package; Refill: 0    Essential hypertension  The current medical regimen is effective;  continue present plan and medications.    Gout, unspecified cause, unspecified chronicity, unspecified site  The current medical regimen is effective;  continue present plan and medications.  -     meloxicam (MOBIC) 15 MG tablet; Take 1 tablet (15 mg total) by mouth once daily.  Dispense: 60 tablet; Refill: 1    Obesity, morbid, BMI 40.0-49.9  The patient is asked to make an attempt to improve diet and exercise patterns to aid in medical management of this problem.    Patient has verbalized understanding and is in agreement with plan of care.    Follow-up if symptoms worsen or fail to improve.

## 2019-01-07 ENCOUNTER — TELEPHONE (OUTPATIENT)
Dept: FAMILY MEDICINE | Facility: CLINIC | Age: 59
End: 2019-01-07

## 2019-01-07 NOTE — TELEPHONE ENCOUNTER
----- Message from Fatou Davis sent at 1/7/2019 12:58 PM CST -----  Contact: Tashia Brady - [employer] says she need to verify the patient's follow up appointment because he put in for one. After she received the not from Loni stating he can return to work.  Please call Tashia Espinoza at [phone].

## 2019-01-08 NOTE — TELEPHONE ENCOUNTER
Attempted to call Tashia again at the number listed. She was gone for the day. Did LVM to call back regarding message below.

## 2019-01-14 ENCOUNTER — TELEPHONE (OUTPATIENT)
Dept: FAMILY MEDICINE | Facility: CLINIC | Age: 59
End: 2019-01-14

## 2019-01-14 NOTE — TELEPHONE ENCOUNTER
----- Message from Ethan Hernández sent at 1/14/2019  1:10 PM CST -----  Contact: Tashia Brady [phone]  Tashia Brady is returning a call back to Ms. Cline in regards to the patient. Please call at your earliest convenience.

## 2019-01-16 NOTE — TELEPHONE ENCOUNTER
Returned Tashia oliva. She was asking if patient had an scheduled appt here at the clinic on 1/3/19. Wanted to confirmed also if he was seen on 12/26/18.

## 2019-03-19 ENCOUNTER — OFFICE VISIT (OUTPATIENT)
Dept: FAMILY MEDICINE | Facility: CLINIC | Age: 59
End: 2019-03-19
Payer: COMMERCIAL

## 2019-03-19 VITALS
BODY MASS INDEX: 42.66 KG/M2 | WEIGHT: 315 LBS | TEMPERATURE: 98 F | DIASTOLIC BLOOD PRESSURE: 82 MMHG | HEIGHT: 72 IN | SYSTOLIC BLOOD PRESSURE: 134 MMHG | RESPIRATION RATE: 20 BRPM

## 2019-03-19 DIAGNOSIS — R07.89 CHEST WALL PAIN: ICD-10-CM

## 2019-03-19 DIAGNOSIS — F51.02 TRANSIENT INSOMNIA: ICD-10-CM

## 2019-03-19 DIAGNOSIS — E78.5 HYPERLIPIDEMIA, UNSPECIFIED HYPERLIPIDEMIA TYPE: ICD-10-CM

## 2019-03-19 DIAGNOSIS — M10.9 GOUT, UNSPECIFIED CAUSE, UNSPECIFIED CHRONICITY, UNSPECIFIED SITE: ICD-10-CM

## 2019-03-19 PROCEDURE — 3079F PR MOST RECENT DIASTOLIC BLOOD PRESSURE 80-89 MM HG: ICD-10-PCS | Mod: CPTII,S$GLB,, | Performed by: FAMILY MEDICINE

## 2019-03-19 PROCEDURE — 3079F DIAST BP 80-89 MM HG: CPT | Mod: CPTII,S$GLB,, | Performed by: FAMILY MEDICINE

## 2019-03-19 PROCEDURE — 3008F BODY MASS INDEX DOCD: CPT | Mod: CPTII,S$GLB,, | Performed by: FAMILY MEDICINE

## 2019-03-19 PROCEDURE — 3075F SYST BP GE 130 - 139MM HG: CPT | Mod: CPTII,S$GLB,, | Performed by: FAMILY MEDICINE

## 2019-03-19 PROCEDURE — 99214 PR OFFICE/OUTPT VISIT, EST, LEVL IV, 30-39 MIN: ICD-10-PCS | Mod: S$GLB,,, | Performed by: FAMILY MEDICINE

## 2019-03-19 PROCEDURE — 3075F PR MOST RECENT SYSTOLIC BLOOD PRESS GE 130-139MM HG: ICD-10-PCS | Mod: CPTII,S$GLB,, | Performed by: FAMILY MEDICINE

## 2019-03-19 PROCEDURE — 3008F PR BODY MASS INDEX (BMI) DOCUMENTED: ICD-10-PCS | Mod: CPTII,S$GLB,, | Performed by: FAMILY MEDICINE

## 2019-03-19 PROCEDURE — 99999 PR PBB SHADOW E&M-EST. PATIENT-LVL IV: CPT | Mod: PBBFAC,,, | Performed by: FAMILY MEDICINE

## 2019-03-19 PROCEDURE — 99214 OFFICE O/P EST MOD 30 MIN: CPT | Mod: S$GLB,,, | Performed by: FAMILY MEDICINE

## 2019-03-19 PROCEDURE — 99999 PR PBB SHADOW E&M-EST. PATIENT-LVL IV: ICD-10-PCS | Mod: PBBFAC,,, | Performed by: FAMILY MEDICINE

## 2019-03-19 RX ORDER — HYDROCODONE BITARTRATE AND ACETAMINOPHEN 5; 325 MG/1; MG/1
1 TABLET ORAL EVERY 8 HOURS PRN
Qty: 21 TABLET | Refills: 0 | Status: SHIPPED | OUTPATIENT
Start: 2019-03-19 | End: 2019-07-09

## 2019-03-19 RX ORDER — LOVASTATIN 40 MG/1
TABLET ORAL
Qty: 90 TABLET | Refills: 1 | Status: SHIPPED | OUTPATIENT
Start: 2019-03-19 | End: 2019-07-09 | Stop reason: SDUPTHER

## 2019-03-19 RX ORDER — INDOMETHACIN 50 MG/1
50 CAPSULE ORAL 3 TIMES DAILY PRN
Qty: 90 CAPSULE | Refills: 3 | Status: ON HOLD | OUTPATIENT
Start: 2019-03-19 | End: 2019-12-18 | Stop reason: HOSPADM

## 2019-03-19 RX ORDER — ALPRAZOLAM 1 MG/1
TABLET ORAL
Qty: 30 TABLET | Refills: 5 | Status: SHIPPED | OUTPATIENT
Start: 2019-03-19 | End: 2019-10-29 | Stop reason: SDUPTHER

## 2019-03-19 NOTE — PATIENT INSTRUCTIONS
Treating Gout Attacks     Raising the joint above the level of your heart can help reduce gout symptoms.     Gout is a disease that affects the joints. It is caused by excess uric acid in your blood stream that may lead to crystals forming in your joints. Left untreated, it can lead to painful foot and joint deformities and even kidney problems. But, by treating gout early, you can relieve pain and help prevent future problems. Gout can usually be treated with medication and proper diet. In severe cases, surgery may be needed.  Gout attacks are painful and often happen more than once. Taking medications may reduce pain and prevent attacks in the future. There are also some things you can do at home to relieve symptoms.  Medications for gout  Your healthcare provider may prescribe a daily medication to reduce levels of uric acid. Reducing your uric acid levels may help prevent gout attacks. Allopurinol is one commonly used medication taken daily to reduce uric acid levels. Other medications can help relieve pain and swelling during an acute attack. Medicines such as NSAIDs (nonsteroidal anti-inflammatory medicines), steroids, and colchicine may be prescribed for intermittent use to relieve an acute gout attack. Be sure to take your medication as directed.  What you can do  Below are some things you can do at home to relieve gout symptoms. Your healthcare provider may have other tips.  · Rest the painful joint as much as you can.  · Raise the painful joint so it is at a level higher than your heart.  · Use ice for 10 minutes every 1-2 hours as possible.  How can I prevent gout?  With a little effort, you may be able to prevent gout attacks in the future. Here are some things you can do:  · Avoid foods high in purines  ¨ Certain meats (red meat, processed meat, turkey)  ¨ Organ meats (kidney, liver, sweetbread)  ¨ Shellfish (lobster, crab, shrimp, scallop, mussel)  ¨ Certain fish (anchovy, sardine, herring,  mackerel)  · Take any medications prescribed by your healthcare provider.  · Lose weight if you need to.  · Reduce high fructose corn syrup in meals and drinks.  · Reduce or eliminate consumption of alcohol, particularly beer, but also red wine and spirits.  · Control blood pressure, diabetes, and cholesterol.  · Drink plenty of water to help flush uric acid from your body.  Date Last Reviewed: 2/1/2016  © 8492-1435 [employer]. [address] [place] [address], [place] [address]. All rights reserved. This information is not intended as a substitute for professional medical care. Always follow your healthcare professional's instructions.        Gout Diet  Gout is a painful condition caused by an excess of uric acid, a waste product made by the body. Uric acid forms crystals that collect in the joints. The immune response to these crystals brings on symptoms of joint pain and swelling. This is called a gout attack. Often, medications and diet changes are combined to manage gout. Below are some guidelines for changing your diet to help you manage gout and prevent attacks. Your health care provider will help you determine the best eating plan for you.     Eating to manage gout  Weight loss for those who are overweight may help reduce gout attacks.  Eat less of these foods  Eating too many foods containing purines may raise the levels of uric acid in your body. This raises your risk for a gout attack. Try to limit these foods and drinks:  · Alcohol, such as beer and red wine. You may be told to avoid alcohol completely.  · Soft drinks that contain sugar or high fructose corn syrup  · Certain fish, including anchovies, sardines, fish eggs, and herring  · Shellfish  · Certain meats, such as red meat, hot dogs, luncheon meats, and turkey  · Organ meats, such as liver, kidneys, and sweetbreads  · Legumes, such as dried beans and peas  · Other high fat foods such as gravy, whole milk, and high fat cheeses  · Vegetables  such as asparagus, cauliflower, spinach, and mushrooms used to be thought to contribute to an increased risk for a gout attack, but recent studies show that high purine vegetables don't increase the risk for a gout attack.  Eat more of these foods  Other foods may be helpful for people with gout. Add some of these foods to your diet:  · Cherries contain chemicals that may lower uric acid.  · Omega fatty acids. These are found in some fatty fish such as salmon, certain oils (flax, olive, or nut), and nuts themselves. Omega fatty acids may help prevent inflammation due to gout.  · Dairy products that are low-fat or fat-free, such as cheese and yogurt  · Complex carbohydrate foods, including whole grains, brown rice, oats, and beans  · Coffee, in moderation  · Water, approximately 64 ounces per day  Follow-up care  Follow up with your healthcare provider as advised.  When to seek medical advice  Call your healthcare provider right away if any of these occur:  · Return of gout symptoms, usually at night:  · Severe pain, swelling, and heat in a joint, especially the base of the big toe  · Affected joint is hard to move  · Skin of the affected joint is purple or red  · Fever of 100.4°F (38°C) or higher  · Pain that doesn't get better even with prescribed medicine   Date Last Reviewed: 1/12/2016  © 9136-8759 [employer]. [address] [place] [address], [place] [address]. All rights reserved. This information is not intended as a substitute for professional medical care. Always follow your healthcare professional's instructions.

## 2019-03-20 NOTE — PROGRESS NOTES
Subjective:       Patient ID: Reagan Carbajal is a 58 y.o. male.    Chief Complaint: Gout  pt had stopped taking his indomethicin, and has been  Eating a lot of seafood which he knows that is not good  For his gout. The swelling and pain is in the right foot and knee  HPI see above  Review of Systems   Constitutional: Negative.    HENT: Negative.    Eyes: Negative.    Respiratory: Negative.    Cardiovascular: Negative.    Gastrointestinal: Negative.    Endocrine: Negative.    Genitourinary: Negative.    Musculoskeletal: Positive for arthralgias, joint swelling and myalgias.   Skin: Negative.    Allergic/Immunologic: Negative.    Neurological: Negative.    Hematological: Negative.    Psychiatric/Behavioral: Negative.        Objective:      Physical Exam   Constitutional: He is oriented to person, place, and time. He appears well-developed and well-nourished. He appears distressed.   HENT:   Head: Normocephalic and atraumatic.   Eyes: Conjunctivae and EOM are normal. Pupils are equal, round, and reactive to light.   Cardiovascular: Normal rate and regular rhythm.   Pulmonary/Chest: Effort normal and breath sounds normal.   Abdominal: Soft. He exhibits no distension. There is no tenderness.   Musculoskeletal:        Right knee: He exhibits decreased range of motion and swelling. Tenderness found.        Left knee: Normal.        Right foot: There is tenderness, bony tenderness and swelling.        Left foot: Normal.   Neurological: He is alert and oriented to person, place, and time. He displays normal reflexes. No cranial nerve deficit or sensory deficit. He exhibits normal muscle tone. Coordination normal.   Skin: Skin is warm and dry. No rash noted. He is not diaphoretic. No pallor.   Psychiatric: He has a normal mood and affect. His behavior is normal. Judgment and thought content normal.   Nursing note and vitals reviewed.      Assessment:       1. Gout, unspecified cause, unspecified chronicity, unspecified site     2. Chest wall pain     3.     Morbid obesity  Plan:     see orders dated 2019   X-Ray Foot Complete Right        X-Ray Knee 1 or 2 View Right         Will contact the patient with results when available    See med card dated 2019   lisinopril (PRINIVIL,ZESTRIL) 40 MG tablet       Analgesic Opioid Hydrocodone Combinations, Analgesic Opioid Hydrocodone and Non-Salicylate Combinations    HYDROcodone-acetaminophen (NORCO) 5-325 mg per tablet 1 tablet, Every 8 hours PRN      Antianxiety Agent - Benzodiazepines, Benzodiazepines    ALPRAZolam (XANAX) 1 MG tablet       Antihyperlipidemic - HMG CoA Reductase Inhibitors (statins)    lovastatin (MEVACOR) 40 MG tablet       Diuretic - Loop    furosemide (LASIX) 20 MG tablet       Gastric Acid Secretion Reducing Agents - Proton Pump Inhibitors (PPIs)    omeprazole (PRILOSEC) 40 MG capsule 40 mg, Daily      GI Antispasmodic - Belladonna Alkaloids    hyoscyamine (ANASPAZ,LEVSIN) 0.125 mg Tab () 125 mcg, Every 6 hours PRN      NSAID Analgesics, (SELF Non-specific) - Indole Acetic Acid Derivatives    indomethacin (INDOCIN) 50 MG capsule 50 mg, 3 times daily PRN        Recommend patient not restart the indomethacin until this gout attack is completely resolved  Patient follow-up for labs and physical in 3 months.

## 2019-07-08 ENCOUNTER — TELEPHONE (OUTPATIENT)
Dept: PRIMARY CARE CLINIC | Facility: CLINIC | Age: 59
End: 2019-07-08

## 2019-07-08 NOTE — TELEPHONE ENCOUNTER
----- Message from Jerrica Cleveland sent at 7/8/2019  2:20 PM CDT -----  Contact: self/[phone]  Pt called in regards to getting his epp lab orders put into the system. His appointment is on tomorrow.       Please advise

## 2019-07-09 ENCOUNTER — OFFICE VISIT (OUTPATIENT)
Dept: PRIMARY CARE CLINIC | Facility: CLINIC | Age: 59
End: 2019-07-09
Payer: COMMERCIAL

## 2019-07-09 ENCOUNTER — LAB VISIT (OUTPATIENT)
Dept: LAB | Facility: HOSPITAL | Age: 59
End: 2019-07-09
Attending: FAMILY MEDICINE
Payer: COMMERCIAL

## 2019-07-09 VITALS
DIASTOLIC BLOOD PRESSURE: 84 MMHG | BODY MASS INDEX: 44.1 KG/M2 | HEIGHT: 71 IN | HEART RATE: 74 BPM | WEIGHT: 315 LBS | SYSTOLIC BLOOD PRESSURE: 158 MMHG | TEMPERATURE: 98 F

## 2019-07-09 DIAGNOSIS — I10 HYPERTENSION, UNSPECIFIED TYPE: Primary | ICD-10-CM

## 2019-07-09 DIAGNOSIS — I51.89 DIASTOLIC DYSFUNCTION: ICD-10-CM

## 2019-07-09 DIAGNOSIS — K21.9 GASTROESOPHAGEAL REFLUX DISEASE WITHOUT ESOPHAGITIS: ICD-10-CM

## 2019-07-09 DIAGNOSIS — R09.1 PLEURISY: ICD-10-CM

## 2019-07-09 DIAGNOSIS — M10.9 GOUT, UNSPECIFIED CAUSE, UNSPECIFIED CHRONICITY, UNSPECIFIED SITE: ICD-10-CM

## 2019-07-09 DIAGNOSIS — R07.9 CHEST PAIN, UNSPECIFIED TYPE: ICD-10-CM

## 2019-07-09 DIAGNOSIS — E78.5 HYPERLIPIDEMIA, UNSPECIFIED HYPERLIPIDEMIA TYPE: ICD-10-CM

## 2019-07-09 DIAGNOSIS — T14.8XXA WOUND DISCHARGE: ICD-10-CM

## 2019-07-09 DIAGNOSIS — I10 ESSENTIAL HYPERTENSION: ICD-10-CM

## 2019-07-09 DIAGNOSIS — F51.02 TRANSIENT INSOMNIA: ICD-10-CM

## 2019-07-09 LAB
ALBUMIN SERPL BCP-MCNC: 3.8 G/DL (ref 3.5–5.2)
ALP SERPL-CCNC: 90 U/L (ref 55–135)
ALT SERPL W/O P-5'-P-CCNC: 23 U/L (ref 10–44)
ANION GAP SERPL CALC-SCNC: 10 MMOL/L (ref 8–16)
AST SERPL-CCNC: 16 U/L (ref 10–40)
BASOPHILS # BLD AUTO: 0.03 K/UL (ref 0–0.2)
BASOPHILS NFR BLD: 0.4 % (ref 0–1.9)
BILIRUB SERPL-MCNC: 0.4 MG/DL (ref 0.1–1)
BUN SERPL-MCNC: 17 MG/DL (ref 6–20)
CALCIUM SERPL-MCNC: 9.2 MG/DL (ref 8.7–10.5)
CHLORIDE SERPL-SCNC: 106 MMOL/L (ref 95–110)
CHOLEST SERPL-MCNC: 205 MG/DL (ref 120–199)
CHOLEST/HDLC SERPL: 6 {RATIO} (ref 2–5)
CO2 SERPL-SCNC: 28 MMOL/L (ref 23–29)
CREAT SERPL-MCNC: 0.9 MG/DL (ref 0.5–1.4)
DIFFERENTIAL METHOD: ABNORMAL
EOSINOPHIL # BLD AUTO: 0.3 K/UL (ref 0–0.5)
EOSINOPHIL NFR BLD: 3.3 % (ref 0–8)
ERYTHROCYTE [DISTWIDTH] IN BLOOD BY AUTOMATED COUNT: 14 % (ref 11.5–14.5)
EST. GFR  (AFRICAN AMERICAN): >60 ML/MIN/1.73 M^2
EST. GFR  (NON AFRICAN AMERICAN): >60 ML/MIN/1.73 M^2
GLUCOSE SERPL-MCNC: 101 MG/DL (ref 70–110)
HCT VFR BLD AUTO: 46.4 % (ref 40–54)
HDLC SERPL-MCNC: 34 MG/DL (ref 40–75)
HDLC SERPL: 16.6 % (ref 20–50)
HGB BLD-MCNC: 14.6 G/DL (ref 14–18)
IMM GRANULOCYTES # BLD AUTO: 0.01 K/UL (ref 0–0.04)
IMM GRANULOCYTES NFR BLD AUTO: 0.1 % (ref 0–0.5)
LDLC SERPL CALC-MCNC: 120 MG/DL (ref 63–159)
LYMPHOCYTES # BLD AUTO: 2.2 K/UL (ref 1–4.8)
LYMPHOCYTES NFR BLD: 27.7 % (ref 18–48)
MCH RBC QN AUTO: 29.9 PG (ref 27–31)
MCHC RBC AUTO-ENTMCNC: 31.5 G/DL (ref 32–36)
MCV RBC AUTO: 95 FL (ref 82–98)
MONOCYTES # BLD AUTO: 0.8 K/UL (ref 0.3–1)
MONOCYTES NFR BLD: 9.6 % (ref 4–15)
NEUTROPHILS # BLD AUTO: 4.7 K/UL (ref 1.8–7.7)
NEUTROPHILS NFR BLD: 58.9 % (ref 38–73)
NONHDLC SERPL-MCNC: 171 MG/DL
NRBC BLD-RTO: 0 /100 WBC
PLATELET # BLD AUTO: 225 K/UL (ref 150–350)
PMV BLD AUTO: 10.8 FL (ref 9.2–12.9)
POTASSIUM SERPL-SCNC: 4.7 MMOL/L (ref 3.5–5.1)
PROT SERPL-MCNC: 7 G/DL (ref 6–8.4)
RBC # BLD AUTO: 4.88 M/UL (ref 4.6–6.2)
SODIUM SERPL-SCNC: 144 MMOL/L (ref 136–145)
TRIGL SERPL-MCNC: 255 MG/DL (ref 30–150)
TSH SERPL DL<=0.005 MIU/L-ACNC: 2.99 UIU/ML (ref 0.4–4)
URATE SERPL-MCNC: 7.8 MG/DL (ref 3.4–7)
WBC # BLD AUTO: 7.91 K/UL (ref 3.9–12.7)

## 2019-07-09 PROCEDURE — 84443 ASSAY THYROID STIM HORMONE: CPT

## 2019-07-09 PROCEDURE — 99999 PR PBB SHADOW E&M-EST. PATIENT-LVL III: ICD-10-PCS | Mod: PBBFAC,,, | Performed by: FAMILY MEDICINE

## 2019-07-09 PROCEDURE — 3079F PR MOST RECENT DIASTOLIC BLOOD PRESSURE 80-89 MM HG: ICD-10-PCS | Mod: CPTII,S$GLB,, | Performed by: FAMILY MEDICINE

## 2019-07-09 PROCEDURE — 99999 PR PBB SHADOW E&M-EST. PATIENT-LVL III: CPT | Mod: PBBFAC,,, | Performed by: FAMILY MEDICINE

## 2019-07-09 PROCEDURE — 80061 LIPID PANEL: CPT

## 2019-07-09 PROCEDURE — 3079F DIAST BP 80-89 MM HG: CPT | Mod: CPTII,S$GLB,, | Performed by: FAMILY MEDICINE

## 2019-07-09 PROCEDURE — 99396 PR PREVENTIVE VISIT,EST,40-64: ICD-10-PCS | Mod: S$GLB,,, | Performed by: FAMILY MEDICINE

## 2019-07-09 PROCEDURE — 36415 COLL VENOUS BLD VENIPUNCTURE: CPT | Mod: PO

## 2019-07-09 PROCEDURE — 84550 ASSAY OF BLOOD/URIC ACID: CPT

## 2019-07-09 PROCEDURE — 3077F SYST BP >= 140 MM HG: CPT | Mod: CPTII,S$GLB,, | Performed by: FAMILY MEDICINE

## 2019-07-09 PROCEDURE — 85025 COMPLETE CBC W/AUTO DIFF WBC: CPT

## 2019-07-09 PROCEDURE — 99396 PREV VISIT EST AGE 40-64: CPT | Mod: S$GLB,,, | Performed by: FAMILY MEDICINE

## 2019-07-09 PROCEDURE — 3077F PR MOST RECENT SYSTOLIC BLOOD PRESSURE >= 140 MM HG: ICD-10-PCS | Mod: CPTII,S$GLB,, | Performed by: FAMILY MEDICINE

## 2019-07-09 PROCEDURE — 80053 COMPREHEN METABOLIC PANEL: CPT

## 2019-07-09 RX ORDER — LISINOPRIL 40 MG/1
40 TABLET ORAL DAILY
Qty: 90 TABLET | Refills: 3 | Status: SHIPPED | OUTPATIENT
Start: 2019-07-09 | End: 2019-12-18

## 2019-07-09 RX ORDER — LOVASTATIN 40 MG/1
TABLET ORAL
Qty: 90 TABLET | Refills: 1 | Status: SHIPPED | OUTPATIENT
Start: 2019-07-09 | End: 2020-08-24

## 2019-07-09 RX ORDER — OMEPRAZOLE 40 MG/1
40 CAPSULE, DELAYED RELEASE ORAL DAILY
Qty: 90 CAPSULE | Refills: 4 | Status: ON HOLD | OUTPATIENT
Start: 2019-07-09 | End: 2019-12-18 | Stop reason: HOSPADM

## 2019-07-09 RX ORDER — MUPIROCIN 20 MG/G
OINTMENT TOPICAL 3 TIMES DAILY
Qty: 30 G | Refills: 1 | Status: SHIPPED | OUTPATIENT
Start: 2019-07-09 | End: 2021-08-11 | Stop reason: SDUPTHER

## 2019-07-09 RX ORDER — FUROSEMIDE 20 MG/1
20 TABLET ORAL DAILY
Qty: 90 TABLET | Refills: 3 | Status: ON HOLD | OUTPATIENT
Start: 2019-07-09 | End: 2019-12-18 | Stop reason: HOSPADM

## 2019-07-10 NOTE — PROGRESS NOTES
Reagan Carbajal is a 59 y.o. male     Chief Complaint:  Physical Exam  Source of history: Patient  Past Medical History:   Diagnosis Date    Anxiety     Colon polyp     CPAP (continuous positive airway pressure) dependence     does not use    GERD (gastroesophageal reflux disease)     Gout 9/13/2018    HTN (hypertension) 11/12/2012    Hyperlipidemia     Insomnia with sleep apnea     Obesity, morbid, BMI 40.0-49.9 6/12/2014     Patient  reports that he quit smoking about 26 years ago. His smoking use included cigarettes and cigars. He has never used smokeless tobacco. He reports that he does not drink alcohol or use drugs.  Family History   Problem Relation Age of Onset    Heart disease Mother     Hyperlipidemia Mother     Hypertension Mother     Lymphoma Mother     Hypertension Father     Hepatitis Brother      ROS:                                                                                GENERAL: No fever, chills, fatigability or weight loss. .  SKIN: No rashes, itching or changes in color or texture of skin.  HEAD: No headaches or recent head trauma.  EYES: Visual acuity fine. No photophobia, ocular pain or diplopia.  EARS: Denies ear pain, discharge or vertigo.  NOSE: No loss of smell, no epistaxis or postnasal drip.  MOUTH & THROAT: No hoarseness or change in voice. No excessive gum bleeding.  NODES: Denies swollen glands.  CHEST: Denies STAPLETON, cyanosis, wheezing, cough and sputum production.  CARDIOVASCULAR: Denies chest pain, PND, orthopnea or reduced exercise tolerance.  ABDOMEN: Appetite fine. No weight loss. Denies diarrhea, abdominal pain, hematemesis or blood in stool.  URINARY: No flank pain, dysuria or hematuria. Frequency of urination at nighttime.  PERIPHERAL VASCULAR: No claudication or cyanosis.  MUSCULOSKELETAL:  Osteoarthritis both knees and feet  NEUROLOGIC: No history of seizures, paralysis, alteration of gait or coordination.    OBJECTIVE:  APPEARANCE: Well nourished, well  developed, in no acute distress.   VS:  see nurses notes 07/09/2019  SKIN: No lesions, good turgor, no rashes.   HEENT: TMs clear bilaterally, ear canals clear bilaterally,   nasal mucosa clear bilaterally, sinuses nontender to percussion, throat mild postnasal drip.  HEAD: Normocephalic, nontender to palpation.  NECK: Supple nontender, no carotid bruits, no thyromegaly.  CHEST: Clear bilaterally, with good respiratory excursion, no evidence of respiratory distress.  CARDIOVASCULAR: S1, S2, regular rate and rhythm without murmur.  BREASTS: No masses palpated in either breast area, or axillary area, symmetry is noted.  ABDOMEN: Soft nontender no hepatosplenomegaly, no guarding or rebound, no pulsatile mass.  PERIPHERAL VASCULAR: Distal pulses palpable throughout, normal capillary refill in all distal extremities, no edema.  MUSCULOSKELETAL: No abnormalities noted.  BACK: No scoliosis, no spasm, cervical, thoracic, lumbar spine nontender to palpation  NEUROLOGIC: Cranial nerves II through XII grossly intact, motor exam 5 out of 5 on distal extremities,   no gait disturbances, sensation intact in all distal extremities  MENTAL STATUS: Patient oriented x3, normal affect, normal mood    ASSESSMENT/PLAN:   Reagan was seen today for annual exam.    Diagnoses and all orders for this visit:    Hypertension, unspecified type  -     Transthoracic echo (TTE) 2D with Color Flow; Future    Essential hypertension  -     lisinopril (PRINIVIL,ZESTRIL) 40 MG tablet; Take 1 tablet (40 mg total) by mouth once daily.    Transient insomnia  xanax 1 mg p.o. q.h.s. p.r.n. sleep  Hyperlipidemia, unspecified hyperlipidemia type  -     lovastatin (MEVACOR) 40 MG tablet; TAKE ONE TABLET BY MOUTH NIGHTLY    Diastolic dysfunction  -     furosemide (LASIX) 20 MG tablet; Take 1 tablet (20 mg total) by mouth once daily.    Gastroesophageal reflux disease without esophagitis  -     omeprazole (PRILOSEC) 40 MG capsule; Take 1 capsule (40 mg total) by  mouth once daily.    Wound discharge  -     mupirocin (BACTROBAN) 2 % ointment; Apply topically 3 (three) times daily.  Morbid obesity  Recommend graduated weight loss with healthy diet and exercise  Labs pending will contact patient with results when available

## 2019-10-29 DIAGNOSIS — F51.02 TRANSIENT INSOMNIA: ICD-10-CM

## 2019-10-29 RX ORDER — ALPRAZOLAM 1 MG/1
TABLET ORAL
Qty: 30 TABLET | Refills: 0 | Status: SHIPPED | OUTPATIENT
Start: 2019-10-29 | End: 2019-12-19 | Stop reason: SDUPTHER

## 2019-12-18 ENCOUNTER — NURSE TRIAGE (OUTPATIENT)
Dept: ADMINISTRATIVE | Facility: CLINIC | Age: 59
End: 2019-12-18

## 2019-12-18 ENCOUNTER — HOSPITAL ENCOUNTER (OUTPATIENT)
Facility: HOSPITAL | Age: 59
Discharge: HOME | End: 2019-12-18
Attending: EMERGENCY MEDICINE | Admitting: INTERNAL MEDICINE
Payer: COMMERCIAL

## 2019-12-18 VITALS
HEART RATE: 96 BPM | WEIGHT: 315 LBS | DIASTOLIC BLOOD PRESSURE: 68 MMHG | HEIGHT: 72 IN | TEMPERATURE: 98 F | OXYGEN SATURATION: 96 % | BODY MASS INDEX: 42.66 KG/M2 | RESPIRATION RATE: 19 BRPM | SYSTOLIC BLOOD PRESSURE: 143 MMHG

## 2019-12-18 DIAGNOSIS — E66.01 OBESITY, MORBID, BMI 40.0-49.9: Chronic | ICD-10-CM

## 2019-12-18 DIAGNOSIS — R49.9 CHANGE IN VOICE: ICD-10-CM

## 2019-12-18 DIAGNOSIS — R13.10 DYSPHAGIA, UNSPECIFIED TYPE: ICD-10-CM

## 2019-12-18 DIAGNOSIS — T78.3XXA ANGIOEDEMA, INITIAL ENCOUNTER: Primary | ICD-10-CM

## 2019-12-18 DIAGNOSIS — I10 ESSENTIAL HYPERTENSION: ICD-10-CM

## 2019-12-18 DIAGNOSIS — K13.79 UVULAR EDEMA: ICD-10-CM

## 2019-12-18 LAB
ALBUMIN SERPL BCP-MCNC: 3.9 G/DL (ref 3.5–5.2)
ALP SERPL-CCNC: 92 U/L (ref 55–135)
ALT SERPL W/O P-5'-P-CCNC: 22 U/L (ref 10–44)
ANION GAP SERPL CALC-SCNC: 9 MMOL/L (ref 8–16)
AST SERPL-CCNC: 16 U/L (ref 10–40)
BASOPHILS # BLD AUTO: 0.04 K/UL (ref 0–0.2)
BASOPHILS NFR BLD: 0.5 % (ref 0–1.9)
BILIRUB SERPL-MCNC: 0.5 MG/DL (ref 0.1–1)
BUN SERPL-MCNC: 17 MG/DL (ref 6–20)
C4 SERPL-MCNC: 32 MG/DL (ref 11–44)
CALCIUM SERPL-MCNC: 9.4 MG/DL (ref 8.7–10.5)
CHLORIDE SERPL-SCNC: 106 MMOL/L (ref 95–110)
CO2 SERPL-SCNC: 27 MMOL/L (ref 23–29)
CREAT SERPL-MCNC: 1 MG/DL (ref 0.5–1.4)
CRP SERPL-MCNC: 7.6 MG/L (ref 0–8.2)
DIFFERENTIAL METHOD: NORMAL
EOSINOPHIL # BLD AUTO: 0.2 K/UL (ref 0–0.5)
EOSINOPHIL NFR BLD: 2.6 % (ref 0–8)
ERYTHROCYTE [DISTWIDTH] IN BLOOD BY AUTOMATED COUNT: 13.7 % (ref 11.5–14.5)
ERYTHROCYTE [SEDIMENTATION RATE] IN BLOOD BY WESTERGREN METHOD: 20 MM/HR (ref 0–10)
EST. GFR  (AFRICAN AMERICAN): >60 ML/MIN/1.73 M^2
EST. GFR  (NON AFRICAN AMERICAN): >60 ML/MIN/1.73 M^2
GLUCOSE SERPL-MCNC: 132 MG/DL (ref 70–110)
HCT VFR BLD AUTO: 43.6 % (ref 40–54)
HGB BLD-MCNC: 14.1 G/DL (ref 14–18)
IMM GRANULOCYTES # BLD AUTO: 0.03 K/UL (ref 0–0.04)
IMM GRANULOCYTES NFR BLD AUTO: 0.3 % (ref 0–0.5)
LYMPHOCYTES # BLD AUTO: 2.3 K/UL (ref 1–4.8)
LYMPHOCYTES NFR BLD: 25.8 % (ref 18–48)
MCH RBC QN AUTO: 29.1 PG (ref 27–31)
MCHC RBC AUTO-ENTMCNC: 32.3 G/DL (ref 32–36)
MCV RBC AUTO: 90 FL (ref 82–98)
MONOCYTES # BLD AUTO: 0.8 K/UL (ref 0.3–1)
MONOCYTES NFR BLD: 9.4 % (ref 4–15)
NEUTROPHILS # BLD AUTO: 5.4 K/UL (ref 1.8–7.7)
NEUTROPHILS NFR BLD: 61.4 % (ref 38–73)
NRBC BLD-RTO: 0 /100 WBC
PLATELET # BLD AUTO: 223 K/UL (ref 150–350)
PMV BLD AUTO: 10.3 FL (ref 9.2–12.9)
POTASSIUM SERPL-SCNC: 3.9 MMOL/L (ref 3.5–5.1)
PROT SERPL-MCNC: 7.1 G/DL (ref 6–8.4)
RBC # BLD AUTO: 4.84 M/UL (ref 4.6–6.2)
SODIUM SERPL-SCNC: 142 MMOL/L (ref 136–145)
WBC # BLD AUTO: 8.76 K/UL (ref 3.9–12.7)

## 2019-12-18 PROCEDURE — G0378 HOSPITAL OBSERVATION PER HR: HCPCS

## 2019-12-18 PROCEDURE — 96375 TX/PRO/DX INJ NEW DRUG ADDON: CPT

## 2019-12-18 PROCEDURE — 94761 N-INVAS EAR/PLS OXIMETRY MLT: CPT

## 2019-12-18 PROCEDURE — 86161 COMPLEMENT/FUNCTION ACTIVITY: CPT

## 2019-12-18 PROCEDURE — S0028 INJECTION, FAMOTIDINE, 20 MG: HCPCS | Performed by: EMERGENCY MEDICINE

## 2019-12-18 PROCEDURE — 36415 COLL VENOUS BLD VENIPUNCTURE: CPT

## 2019-12-18 PROCEDURE — 96374 THER/PROPH/DIAG INJ IV PUSH: CPT

## 2019-12-18 PROCEDURE — 63600175 PHARM REV CODE 636 W HCPCS: Performed by: EMERGENCY MEDICINE

## 2019-12-18 PROCEDURE — 25000003 PHARM REV CODE 250: Performed by: NURSE PRACTITIONER

## 2019-12-18 PROCEDURE — 80053 COMPREHEN METABOLIC PANEL: CPT

## 2019-12-18 PROCEDURE — 85025 COMPLETE CBC W/AUTO DIFF WBC: CPT

## 2019-12-18 PROCEDURE — 85652 RBC SED RATE AUTOMATED: CPT

## 2019-12-18 PROCEDURE — 86160 COMPLEMENT ANTIGEN: CPT | Mod: 59

## 2019-12-18 PROCEDURE — 86140 C-REACTIVE PROTEIN: CPT

## 2019-12-18 PROCEDURE — 63600175 PHARM REV CODE 636 W HCPCS: Performed by: HOSPITALIST

## 2019-12-18 PROCEDURE — 86160 COMPLEMENT ANTIGEN: CPT

## 2019-12-18 PROCEDURE — 25000242 PHARM REV CODE 250 ALT 637 W/ HCPCS: Performed by: EMERGENCY MEDICINE

## 2019-12-18 PROCEDURE — 99285 EMERGENCY DEPT VISIT HI MDM: CPT | Mod: 25

## 2019-12-18 PROCEDURE — 25000003 PHARM REV CODE 250: Performed by: EMERGENCY MEDICINE

## 2019-12-18 PROCEDURE — 96376 TX/PRO/DX INJ SAME DRUG ADON: CPT

## 2019-12-18 RX ORDER — PANTOPRAZOLE SODIUM 40 MG/1
40 TABLET, DELAYED RELEASE ORAL DAILY
Status: DISCONTINUED | OUTPATIENT
Start: 2019-12-18 | End: 2019-12-18 | Stop reason: HOSPADM

## 2019-12-18 RX ORDER — METHYLPREDNISOLONE SOD SUCC 125 MG
125 VIAL (EA) INJECTION
Status: COMPLETED | OUTPATIENT
Start: 2019-12-18 | End: 2019-12-18

## 2019-12-18 RX ORDER — SODIUM CHLORIDE 0.9 % (FLUSH) 0.9 %
10 SYRINGE (ML) INJECTION
Status: DISCONTINUED | OUTPATIENT
Start: 2019-12-18 | End: 2019-12-18 | Stop reason: HOSPADM

## 2019-12-18 RX ORDER — ENOXAPARIN SODIUM 100 MG/ML
40 INJECTION SUBCUTANEOUS EVERY 24 HOURS
Status: DISCONTINUED | OUTPATIENT
Start: 2019-12-18 | End: 2019-12-18 | Stop reason: HOSPADM

## 2019-12-18 RX ORDER — EPINEPHRINE 0.15 MG/.15ML
0.3 INJECTION SUBCUTANEOUS
Qty: 1 DEVICE | Refills: 0 | Status: SHIPPED | OUTPATIENT
Start: 2019-12-18 | End: 2019-12-18 | Stop reason: SDUPTHER

## 2019-12-18 RX ORDER — HYOSCYAMINE SULFATE 0.12 MG/1
0.12 TABLET SUBLINGUAL EVERY 6 HOURS PRN
Status: DISCONTINUED | OUTPATIENT
Start: 2019-12-18 | End: 2019-12-18 | Stop reason: HOSPADM

## 2019-12-18 RX ORDER — METHYLPREDNISOLONE SOD SUCC 125 MG
125 VIAL (EA) INJECTION ONCE
Status: COMPLETED | OUTPATIENT
Start: 2019-12-18 | End: 2019-12-18

## 2019-12-18 RX ORDER — LOVASTATIN 20 MG/1
40 TABLET ORAL NIGHTLY
Status: DISCONTINUED | OUTPATIENT
Start: 2019-12-18 | End: 2019-12-18 | Stop reason: HOSPADM

## 2019-12-18 RX ORDER — VERAPAMIL HYDROCHLORIDE 80 MG/1
80 TABLET ORAL 2 TIMES DAILY
Status: DISCONTINUED | OUTPATIENT
Start: 2019-12-18 | End: 2019-12-18 | Stop reason: HOSPADM

## 2019-12-18 RX ORDER — METHYLPREDNISOLONE SOD SUCC 125 MG
125 VIAL (EA) INJECTION EVERY 6 HOURS
Status: DISCONTINUED | OUTPATIENT
Start: 2019-12-18 | End: 2019-12-18 | Stop reason: HOSPADM

## 2019-12-18 RX ORDER — ALPRAZOLAM 0.25 MG/1
0.25 TABLET ORAL 2 TIMES DAILY
Status: DISCONTINUED | OUTPATIENT
Start: 2019-12-18 | End: 2019-12-18 | Stop reason: HOSPADM

## 2019-12-18 RX ORDER — ACETAMINOPHEN 325 MG/1
650 TABLET ORAL EVERY 8 HOURS PRN
Status: DISCONTINUED | OUTPATIENT
Start: 2019-12-18 | End: 2019-12-18 | Stop reason: HOSPADM

## 2019-12-18 RX ORDER — ONDANSETRON 2 MG/ML
4 INJECTION INTRAMUSCULAR; INTRAVENOUS EVERY 4 HOURS PRN
Status: DISCONTINUED | OUTPATIENT
Start: 2019-12-18 | End: 2019-12-18 | Stop reason: HOSPADM

## 2019-12-18 RX ORDER — CETIRIZINE HYDROCHLORIDE 10 MG/1
10 TABLET ORAL 2 TIMES DAILY
Status: DISCONTINUED | OUTPATIENT
Start: 2019-12-18 | End: 2019-12-18 | Stop reason: HOSPADM

## 2019-12-18 RX ORDER — FAMOTIDINE 20 MG/1
20 TABLET, FILM COATED ORAL 2 TIMES DAILY
Qty: 60 TABLET | Refills: 1 | Status: SHIPPED | OUTPATIENT
Start: 2019-12-18 | End: 2021-02-11

## 2019-12-18 RX ORDER — PREDNISONE 20 MG/1
TABLET ORAL
Qty: 22 TABLET | Refills: 0 | Status: SHIPPED | OUTPATIENT
Start: 2019-12-18 | End: 2020-01-22 | Stop reason: ALTCHOICE

## 2019-12-18 RX ORDER — DIPHENHYDRAMINE HYDROCHLORIDE 50 MG/ML
25 INJECTION INTRAMUSCULAR; INTRAVENOUS
Status: COMPLETED | OUTPATIENT
Start: 2019-12-18 | End: 2019-12-18

## 2019-12-18 RX ORDER — AMOXICILLIN 250 MG
1 CAPSULE ORAL 2 TIMES DAILY
Status: DISCONTINUED | OUTPATIENT
Start: 2019-12-18 | End: 2019-12-18 | Stop reason: HOSPADM

## 2019-12-18 RX ORDER — EPINEPHRINE 0.15 MG/.15ML
0.3 INJECTION SUBCUTANEOUS
Qty: 1 DEVICE | Refills: 0 | Status: SHIPPED | OUTPATIENT
Start: 2019-12-18 | End: 2024-02-16

## 2019-12-18 RX ORDER — DIPHENHYDRAMINE HYDROCHLORIDE 50 MG/ML
25 INJECTION INTRAMUSCULAR; INTRAVENOUS EVERY 6 HOURS
Status: DISCONTINUED | OUTPATIENT
Start: 2019-12-18 | End: 2019-12-18

## 2019-12-18 RX ORDER — VERAPAMIL HYDROCHLORIDE 40 MG/1
40 TABLET ORAL 2 TIMES DAILY
Qty: 60 TABLET | Refills: 6 | Status: SHIPPED | OUTPATIENT
Start: 2019-12-18 | End: 2019-12-19

## 2019-12-18 RX ORDER — FUROSEMIDE 20 MG/1
20 TABLET ORAL DAILY
Status: DISCONTINUED | OUTPATIENT
Start: 2019-12-18 | End: 2019-12-18 | Stop reason: HOSPADM

## 2019-12-18 RX ORDER — FLUTICASONE PROPIONATE 50 MCG
2 SPRAY, SUSPENSION (ML) NASAL
Status: COMPLETED | OUTPATIENT
Start: 2019-12-18 | End: 2019-12-18

## 2019-12-18 RX ORDER — FAMOTIDINE 10 MG/ML
20 INJECTION INTRAVENOUS
Status: COMPLETED | OUTPATIENT
Start: 2019-12-18 | End: 2019-12-18

## 2019-12-18 RX ORDER — ALPRAZOLAM 0.5 MG/1
1 TABLET ORAL NIGHTLY PRN
Status: DISCONTINUED | OUTPATIENT
Start: 2019-12-18 | End: 2019-12-18 | Stop reason: HOSPADM

## 2019-12-18 RX ORDER — CETIRIZINE HYDROCHLORIDE 10 MG/1
10 TABLET ORAL 2 TIMES DAILY
Qty: 30 TABLET | Refills: 0 | Status: SHIPPED | OUTPATIENT
Start: 2019-12-18 | End: 2020-09-16 | Stop reason: SDUPTHER

## 2019-12-18 RX ADMIN — FUROSEMIDE 20 MG: 20 TABLET ORAL at 08:12

## 2019-12-18 RX ADMIN — METHYLPREDNISOLONE SODIUM SUCCINATE 125 MG: 125 INJECTION, POWDER, FOR SOLUTION INTRAMUSCULAR; INTRAVENOUS at 03:12

## 2019-12-18 RX ADMIN — FAMOTIDINE 20 MG: 10 INJECTION INTRAVENOUS at 03:12

## 2019-12-18 RX ADMIN — VERAPAMIL HYDROCHLORIDE 80 MG: 80 TABLET, FILM COATED ORAL at 03:12

## 2019-12-18 RX ADMIN — METHYLPREDNISOLONE SODIUM SUCCINATE 125 MG: 125 INJECTION, POWDER, FOR SOLUTION INTRAMUSCULAR; INTRAVENOUS at 04:12

## 2019-12-18 RX ADMIN — METHYLPREDNISOLONE SODIUM SUCCINATE 125 MG: 125 INJECTION, POWDER, FOR SOLUTION INTRAMUSCULAR; INTRAVENOUS at 08:12

## 2019-12-18 RX ADMIN — FLUTICASONE PROPIONATE 100 MCG: 50 SPRAY, METERED NASAL at 04:12

## 2019-12-18 RX ADMIN — PANTOPRAZOLE SODIUM 40 MG: 40 TABLET, DELAYED RELEASE ORAL at 08:12

## 2019-12-18 RX ADMIN — DIPHENHYDRAMINE HYDROCHLORIDE 25 MG: 50 INJECTION INTRAMUSCULAR; INTRAVENOUS at 08:12

## 2019-12-18 RX ADMIN — DIPHENHYDRAMINE HYDROCHLORIDE 25 MG: 50 INJECTION INTRAMUSCULAR; INTRAVENOUS at 03:12

## 2019-12-18 NOTE — ASSESSMENT & PLAN NOTE
Patient counseled for 10 minutes on the dangers of unhealthy lifestyle choices including unhealthy eating habits, long term risk of inappropriate choices, as well as advantages to behavior modification.  - Continue counseling per nursing  -Nursing [employer] Diet resources

## 2019-12-18 NOTE — PLAN OF CARE
12/18/19 1622   Final Note   Assessment Type Final Discharge Note   Anticipated Discharge Disposition Home   Hospital Follow Up  Appt(s) scheduled? Yes   Discharge plans and expectations educations in teach back method with documentation complete? No       SW informed Nurse Sheriff pt was cleared from case management.

## 2019-12-18 NOTE — HPI
Reagan Carbajal is a 59 y.o. AAM with PMHx including HTN, SHARAN, Obesity and Gout. Per the patient and his wife who is at the bedside, the patient awaken about 2 am in the morning stating that he felt like his tonsils were laying on his tongue, with associated difficulty breathing. He awaken his wife who brought him to ED. Similar events in the past X2 which were not as severe and were transient. Last time about 1 year ago that spontaneously resolved without treatment. Prior to that episode he had one about 6 months before. He did not take anything at home for relief. 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 Also took lisinopril last 2 days ago on Monday 12/16/19 and forgot to take it on Tuesday. He did take the lisinopril prior to presentation, however, he took the medication after he stated having the above symptoms, states he thought the symptoms were from his blood pressure so he took his lisinopril after the epidsode of edema began. He denies taking any OTC NSAIDS.    White count normal, lytes normal, sed rate 20, crp normal

## 2019-12-18 NOTE — PROGRESS NOTES
[facility] CASE MANAGEMENT                  WRITTEN DISCHARGE INFORMATION      APPOINTMENTS AND RESOURCES TO HELP YOU MANAGE YOUR CARE AT HOME BASED ON YOUR PREFERENCES:  (If an appointment is not scheduled for you when you leave the hospital, call your doctor to schedule a follow up visit within a week)    Follow-up Information     Schedule an appointment as soon as possible for a visit with Carmen Cosby MD.    Specialty:  Family Medicine  Contact information:  [address] [facility]  [address]  [facility] [address]  [phone]             [facility] - Allergy/ Immunology.    Specialty:  Allergy  Why:  Nurse will call pt with appt  Contact information:  [address] [place] [address] [phone]  Additional information:  2nd floor                 Healthy Living Instructions to HELP MANAGE YOUR CARE AT HOME:  Things You are responsible for:  1.    Getting your prescriptions filled   2.    Taking your medications as directed, DO NOT MISS ANY DOSES!  3.    Following the diet and exercise recommended by your doctor  4.    Going to your follow-up doctor appointment. This is important because it allows the doctor to monitor your progress and determine if any changes need to made to your treatment plan.  5. If you have any questions about MANAGING YOUR CARE AT HOME Call the Nurse Care Line for 24/7 Assistance [phone]       Please answer any calls you may receive from [facility]. We want to continue to support you as you manage your healthcare needs. [facility] is happy to have the opportunity to serve you.      Thank you for choosing [facility] for your healthcare needs!  Your [facility] Case Management Team,

## 2019-12-18 NOTE — ASSESSMENT & PLAN NOTE
Unclear etiology - he has been taking ACEI for years as well as eating seafood. He states that he had 2 prior events over the past 1.5 that were not as severe. He did not take his lisinopril for 2 days prior to this event. He did consume seafood just before bed. C1/C4 complements ordered but will likely be delayed as they are send outs. Inflammatory markers obtained, sed rate mildly elevated, crp normal. He is non-distressed appearing. He states that he has some awkward mouth movements at baseline due to ill fitting dentures. His uvula is midline no swelling noted - some redness without exudate.  -mechanical soft diet  -will monitor until this afternoon; will likely discharge after next dose of IV steroids and dual coverage/treatment with H2 blocker/antihistimine/steroid  -Home with epi pen  -refer

## 2019-12-18 NOTE — ASSESSMENT & PLAN NOTE
No evidence of flare - takes indocin - stop NSAIDS for now until fu in clinic with immunologist for further workup

## 2019-12-18 NOTE — ED TRIAGE NOTES
patient states something is coming up is throat and he can't breathe. Patient states he does take lisinopril. Patient states when he tries to clear his throat something is coming up and choking him. Patient showed in triage. His uvula is swollen. Wife states tongue is swelling too. Pt states it started tonight. AAOx3 speech is normal.

## 2019-12-18 NOTE — ED TRIAGE NOTES
59 y.o male presents to the ED w/ c/o uvula swelling that woke him up from sleep. Reports this has happened in the past.

## 2019-12-18 NOTE — PLAN OF CARE
12/18/19 1303   Discharge Assessment   Assessment Type Discharge Planning Assessment   Assessment information obtained from? Caregiver  (wife)   Prior to hospitilization cognitive status: Unable to Assess   Prior to hospitalization functional status: Independent   Current cognitive status: Unable to Assess   Current Functional Status: Independent   Facility Arrived From: home   Lives With spouse   Able to Return to Prior Arrangements yes   Is patient able to care for self after discharge? Yes   Patient's perception of discharge disposition home or selfcare   Readmission Within the Last 30 Days no previous admission in last 30 days   Patient currently being followed by outpatient case management? No   Patient currently receives any other outside agency services? No   Equipment Currently Used at Home none   Do you have any problems affording any of your prescribed medications? No   Is the patient taking medications as prescribed? yes   Does the patient have transportation home? Yes   Transportation Anticipated car, drives self;family or friend will provide   Dialysis Name and Scheduled days N/A   Does the patient receive services at the [facility]? No   Discharge Plan A Home with family   Discharge Plan B Home with family   DME Needed Upon Discharge  none   Patient/Family in Agreement with Plan yes         [facility] - [place] - [address]  [address]  [place] [address]  Phone: [phone] Fax: [phone]

## 2019-12-18 NOTE — SUBJECTIVE & OBJECTIVE
Past Medical History:   Diagnosis Date    Anxiety     Colon polyp     CPAP (continuous positive airway pressure) dependence     does not use    GERD (gastroesophageal reflux disease)     Gout 2018    HTN (hypertension) 2012    Hyperlipidemia     Insomnia with sleep apnea     Obesity, morbid, BMI 40.0-49.9 2014       Past Surgical History:   Procedure Laterality Date    COLONOSCOPY N/A 5/3/2016    Procedure: COLONOSCOPY;  Surgeon: Chance Glynn MD;  Location: [address]);  Service: Endoscopy;  Laterality: N/A;    COLONOSCOPY W/ POLYPECTOMY         Review of patient's allergies indicates:   Allergen Reactions    Tramadol      Nausea      Triamterene        No current facility-administered medications on file prior to encounter.      Current Outpatient Medications on File Prior to Encounter   Medication Sig    furosemide (LASIX) 20 MG tablet Take 1 tablet (20 mg total) by mouth once daily.    lovastatin (MEVACOR) 40 MG tablet TAKE ONE TABLET BY MOUTH NIGHTLY    [DISCONTINUED] lisinopril (PRINIVIL,ZESTRIL) 40 MG tablet Take 1 tablet (40 mg total) by mouth once daily.    ALPRAZolam (XANAX) 1 MG tablet TAKE 1 TABLET BY MOUTH AT BEDTIME AS NEEDED FOR ANXIETY    hyoscyamine (ANASPAZ,LEVSIN) 0.125 mg Tab Take 1 tablet (125 mcg total) by mouth every 6 (six) hours as needed.    indomethacin (INDOCIN) 50 MG capsule Take 1 capsule (50 mg total) by mouth 3 (three) times daily as needed (Gout pain).    mupirocin (BACTROBAN) 2 % ointment Apply topically 3 (three) times daily.    omeprazole (PRILOSEC) 40 MG capsule Take 1 capsule (40 mg total) by mouth once daily.     Family History     Problem Relation (Age of Onset)    Heart disease Mother    Hepatitis Brother    Hyperlipidemia Mother    Hypertension Mother, Father    Lymphoma Mother        Tobacco Use    Smoking status: Former Smoker     Types: Cigarettes, Cigars     Last attempt to quit: 5/3/1993     Years since quittin.6     Smokeless tobacco: Never Used   Substance and Sexual Activity    Alcohol use: No     Alcohol/week: 0.0 standard drinks    Drug use: No    Sexual activity: Yes     Partners: Female     Birth control/protection: None     Review of Systems   Constitutional: Positive for appetite change. Negative for chills, diaphoresis and fever.   HENT: Positive for trouble swallowing and voice change. Negative for congestion, facial swelling, hearing loss, mouth sores, postnasal drip, sore throat and tinnitus.    Eyes: Negative for photophobia, discharge, itching and visual disturbance.   Respiratory: Positive for shortness of breath. Negative for apnea, cough, wheezing and stridor.    Cardiovascular: Negative for chest pain, palpitations and leg swelling.   Gastrointestinal: Negative for abdominal distention, abdominal pain, blood in stool, constipation, diarrhea and nausea.   Endocrine: Negative for polydipsia, polyphagia and polyuria.   Genitourinary: Negative for difficulty urinating, dysuria, flank pain and frequency.   Musculoskeletal: Negative for arthralgias, joint swelling and neck stiffness.   Skin: Negative for color change, rash and wound.   Neurological: Positive for weakness. Negative for dizziness, tremors, seizures, light-headedness, numbness and headaches.   Hematological: Negative for adenopathy.   Psychiatric/Behavioral: Negative for hallucinations and self-injury.     Objective:     Vital Signs (Most Recent):  Temp: 97.7 °F (36.5 °C) (12/18/19 1200)  Pulse: 83 (12/18/19 1200)  Resp: (!) 22 (12/18/19 1200)  BP: (!) 158/70 (12/18/19 1200)  SpO2: (!) 94 % (12/18/19 1200) Vital Signs (24h Range):  Temp:  [97.7 °F (36.5 °C)-98.3 °F (36.8 °C)] 97.7 °F (36.5 °C)  Pulse:  [77-99] 83  Resp:  [18-28] 22  SpO2:  [92 %-98 %] 94 %  BP: (139-161)/(59-93) 158/70     Weight: (!) 156.4 kg (344 lb 12.8 oz)  Body mass index is 46.75 kg/m².    Physical Exam   Constitutional: He is oriented to person, place, and time. He appears  well-developed and well-nourished. He is cooperative.   HENT:   Head: Normocephalic and atraumatic.   Ulnar midline without swelling noted; scant redness without exudate    Eyes: Pupils are equal, round, and reactive to light. Conjunctivae, EOM and lids are normal.   Neck: Normal range of motion and full passive range of motion without pain. Neck supple. No JVD present. No edema present. No thyroid mass present.   Cardiovascular: Normal rate, S1 normal, S2 normal and intact distal pulses.   No murmur heard.  Pulmonary/Chest: Effort normal.   Abdominal: Soft. Bowel sounds are normal. He exhibits no distension and no abdominal bruit. There is no splenomegaly or hepatomegaly. There is no tenderness. There is no CVA tenderness.   Lymphadenopathy:     He has no cervical adenopathy.     He has no axillary adenopathy.   Neurological: He is alert and oriented to person, place, and time. He has normal reflexes. He displays no tremor. He displays no seizure activity.   Skin: Skin is warm, dry and intact.   Psychiatric: He has a normal mood and affect. His speech is normal. Thought content normal. Cognition and memory are normal.         CRANIAL NERVES     CN III, IV, VI   Pupils are equal, round, and reactive to light.  Extraocular motions are normal.        Significant Labs:   CBC:   Recent Labs   Lab 12/18/19  0300   WBC 8.76   HGB 14.1   HCT 43.6        CMP:   Recent Labs   Lab 12/18/19  0300      K 3.9      CO2 27   *   BUN 17   CREATININE 1.0   CALCIUM 9.4   PROT 7.1   ALBUMIN 3.9   BILITOT 0.5   ALKPHOS 92   AST 16   ALT 22   ANIONGAP 9   EGFRNONAA >60     All pertinent labs within the past 24 hours have been reviewed.    Significant Imaging: I have reviewed all pertinent imaging results/findings within the past 24 hours.

## 2019-12-18 NOTE — DISCHARGE INSTRUCTIONS
Angioedema  Angioedema (PN-[id number]-BENJAMIN-muh) is a sudden appearance of swollen patches (edema) on the skin or mucous membranes. It most often involves the face, lips, mouth, tongue, back of throat, or vocal cords. It may also occur in other places, such as the arms or legs. A rash may also appear during the first 4 days of this illness.  There are different types of angioedema. Your symptoms will depend on what type of angioedema you have. Swelling and redness may be the main symptoms. Like allergic reactions, angioedema may include:  · Rash, hives, redness, welts, blisters  · Itching, burning, stinging, pain  · Dry, flaky, cracking, or scaly skin  · Swelling of the face, lips, tongue, or other parts of the body  More severe symptoms may include:  · Trouble swallowing, or feeling like your throat is closing  · Trouble breathing or wheezing  · Hoarse voice or trouble speaking  · Nausea, vomiting, diarrhea, or stomach cramps  · Feeling faint or lightheaded, rapid heart rate, or low blood pressure  Angioedema can be triggered by exposure to certain substances. Medical conditions involving the immune systems and certain infections may cause it. In rare cases, angioedema can be hereditary. Sometimes the cause may be very clear. However, it is often hard to find a cause. The most common causes include:  · Foods, such as shrimp, shellfish, peanuts, milk products, gluten, and eggs; also colorings, flavorings, and additives  · Insect bites or stings, from bees, mosquitos, fleas, or ticks  · Medicines, such as ACE inhibitors, penicillin, sulfa drugs, amoxicillin, aspirin, and ibuprofen  · Latex, which may be in gloves, clothes, toys, balloons, and some kinds of tape. People who are allergic to latex may have problems with foods such as bananas, avocados, kiwi, papaya, or chestnuts.  · Stress  · Heat, cold, or sunlight  The most common cause of angioedema is a reaction to a class of medicines called ACE inhibitors. These  are used to treat high blood pressure. ACE inhibitors include captopril, enalapril, and lisinopril. Angiodema can happen even after you have been taking the medicine for some time. Tell your doctor if you have angioedema symptoms and are taking any of these medicines. Angioedema may recur. It is important to watch for the earliest signs of this condition (see the list below). Contact your healthcare provider right away if swelling involves the face, mouth, or throat.  Home care  Rest quietly today. Avoid vigorous physical activity.  Medicines: The healthcare provider may prescribe medicines for itching, swelling, or pain. Follow the healthcare providers instructions when taking these medicines.  · Oral diphenhydramine is an antihistamine available without a prescription. Unless a prescription antihistamine was given, diphenhydramine may be used to reduce widespread itching. It may make you sleepy, so be careful using it when going to school, working, or driving. (Note: Do not use diphenhydramine if you have glaucoma or if you are a man who has trouble urinating due to an enlarged prostate.) Loratadine is an antihistamine that may cause less drowsiness.  · Do not use diphenhydramine cream on your skin. Some people can have an allergic reaction to this.  · Calamine lotion or oatmeal baths sometimes help with itching.  · You may use acetaminophen or ibuprofen for pain, unless another pain medicine was prescribed.  · If you were told that your angioedema was caused by a medicine you are taking, you must stop taking it. Ask your healthcare provider for a different one. In the future, advise medical staff that you are allergic to this medicine.  · If medicine was prescribed, such as steroids or antihistamines, be sure you understand what the medicine is and how to take it.   General care  · Make sure you do not scratch areas of the body that had a reaction. This will help prevent infection.   · Stay away from air  pollution, tobacco, and wood smoke. Also stay away from cold temperatures. These things can make allergy symptoms worse.  · Try to find out what cause your reaction. Make sure to remove the allergen. Future reactions may be worse.   · If you have a serious allergy, wear a medical alert bracelet that notes this allergy.  · If the healthcare provider prescribed an epinephrine auto injector kit, keep it with you at all times.   · Tell all care providers about your allergy. Ask them h ow to use any prescribed medicines.  · Keep a record of allergies and symptoms, and when they occurred. This will help your provider treat you over time.   Follow-up care  Follow up with your healthcare provider, or as advised. You may need to see an allergist. An allergist can help find the cause of an allergic reaction and give recommendations on how to prevent future reactions.  Call 911  Contact emergency services right away if any of these occur:  · Trouble breathing or swallowing, or wheezing  · Hoarse voice or trouble speaking, or drooling  · Chest pain or tightness  · Confusion, lightheadedness, or dizziness  · Extreme drowsiness or trouble awakening  · Fainting or loss of consciousness  · Rapid heart rate  · Vomiting blood, or large amounts of blood in stool  · Seizure  · Nausea, vomiting, diarrhea, abdominal pain, or stomach cramps  When to seek medical attention  Call your healthcare provider right away if any of the following occur:  · Symptoms don't go away  · Symptoms come back  · Symptoms get worse or new symptoms develop  · Hives feel uncomfortable  · Fever of 100.4°F (38°C), or as directed by your healthcare professional  Date Last Reviewed: 5/1/2017  © 5365-9423 [employer]. [address] [place] [address], [place] [address]. All rights reserved. This information is not intended as a substitute for professional medical care. Always follow your healthcare professional's instructions.          Uvulitis    The uvula is  the tissue that hangs in the back of the throat. Uvulitis is inflammation of the uvula. Inflammation happens when the body responds to an injury, allergic reaction, infection or illness. Symptoms of inflammation may include redness, irritation, itching, swelling, or burning. Uvulitis is more common in children than adults.  Symptoms of uvulitis include:  · Sore throat  · Trouble swallowing  · Painful swallowing  · Trouble breathing  Possible causes of uvulitis include:  · Throat infection  · Inhaling or swallowing chemicals   · Inhaling hot air or steam  · Allergic reaction to something eaten, touched or breathed in  In rare cases, uvulitis can be caused by a condition called angioedema. Angioendema can be:  · Hereditary (runs in the family).   · A side effect of a class of drugs used for high blood pressure called ACE inhibitors.  · Life-threatening. It can lead to swelling of the air passage in the mouth or throat. Severe swelling can block your breathing and cause death. Watch for the earliest signs of this illness. Call 911 if the swelling involves the face, mouth, or throat areas.  To help find the cause of the uvulitis, imaging tests may be done. If infection is suspected, swabs from the throat or samples of blood may be tested. Questions may be asked about an individuals vaccination history to be sure it is up to date. A cause for uvulitis is not always found.  Treatment depends on the cause and the severity of the symptoms.  Home care  Medicines: The doctor may prescribe antibiotics for an infection. For an allergic reaction or angioedema, medicines called steroids or antihistamines may be given. Follow instructions when using any medicine.  To care for the condition at home:  · Rest until the symptoms go away.  · If medicines were prescribed, be sure they are taken as directed. They should be taken until they are gone or the healthcare provider says to stop them.  · If you were told that your angioedema  was from a medicine that you are taking, you must stop taking this medicine. Contact your doctor for a prescription for a different medicine. Advise future medical providers that you are allergic to this medicine.  · Contact your healthcare provider before taking any over-the-counter medicines.  · Drink fluids. Pain when swallowing may make it harder to drink and lead to dehydration. To prevent this, sip fluids throughout the day. Children can be given frozen juice bars, milk, or other cold liquids. Watch for the signs of dehydration listed below.  · Ask your healthcare provider when you can return to work or school. Ask your childs healthcare provider when your child can return to school or .  Follow-up care  Follow up with the healthcare provider as directed. You may be referred to a specialist (an allergy doctor and/or an ear, nose, or throat doctor) for further evaluation and treatment. Make these visits as soon as possible.  When to seek medical advice  Call the healthcare provider right away for any of the following:  · Symptoms not going away or getting worse  · Symptoms of dehydration, including dark urine, dry mouth, cracked lips, dizziness, or sunken eyes  · A child acting very sick or not improving  Fever in adults:   · Fever over 100.4°F (38°C), or as directed by the healthcare provider.  Fever in children:  · Child of any age has repeated fevers above 104°F (40°C).  · Child younger than 2 years of age has a fever of 100.4°F (38°C) that continues for more than 1 day.  · Child 2 years old or older has a fever of 100.4°F (38°C) that continues for more than 3 days.  Call 911  Call 911 if any of these occur:  · Drooling or trouble swallowing  · Trouble breathing  · Trouble talking  · Swollen tongue, lips, face, or throat (may be indicated by voice changes)  · Jerking and loss of consciousness; seizure  · Lack of response, extreme drowsiness, or trouble waking up  · Fainting  · Confusion  · Child  being unresponsive  · Skin or lips look blue, purple, or gray  Date Last Reviewed: 11/20/2015  © 0203-7442 [employer]. [address] [place] [address], [place] [address]. All rights reserved. This information is not intended as a substitute for professional medical care. Always follow your healthcare professional's instructions.

## 2019-12-18 NOTE — ED PROVIDER NOTES
Encounter Date: 2019       History     Chief Complaint   Patient presents with    Oral Swelling     patient states something is coming up is throat and he can't breathe     58 yo male presents via wife with acute onset swelling at the back of throat, voice change, and difficulty swallowing and breathing. Patient states he has had this in the past but never as severe. No new foods today. Patient does take Lisinopril for HTN and took it tonight.    PMH: HTN, DLD, SHARAN on CPAP (noncompliant), GERD, colon polyp, gout, obesity, insomnia  Psych: anxiety  PSH: colonoscopy with polypectomy        Review of patient's allergies indicates:   Allergen Reactions    Tramadol      Nausea      Triamterene      Past Medical History:   Diagnosis Date    Anxiety     Colon polyp     CPAP (continuous positive airway pressure) dependence     does not use    GERD (gastroesophageal reflux disease)     Gout 2018    HTN (hypertension) 2012    Hyperlipidemia     Insomnia with sleep apnea     Obesity, morbid, BMI 40.0-49.9 2014     Past Surgical History:   Procedure Laterality Date    COLONOSCOPY N/A 5/3/2016    Procedure: COLONOSCOPY;  Surgeon: Chance Glynn MD;  Location: [facility] ([address] [place] [address]);  Service: Endoscopy;  Laterality: N/A;    COLONOSCOPY W/ POLYPECTOMY       Family History   Problem Relation Age of Onset    Heart disease Mother     Hyperlipidemia Mother     Hypertension Mother     Lymphoma Mother     Hypertension Father     Hepatitis Brother      Social History     Tobacco Use    Smoking status: Former Smoker     Types: Cigarettes, Cigars     Last attempt to quit: 5/3/1993     Years since quittin.6    Smokeless tobacco: Never Used   Substance Use Topics    Alcohol use: No     Alcohol/week: 0.0 standard drinks    Drug use: No     Review of Systems   Constitutional: Negative for diaphoresis.   HENT: Positive for trouble swallowing and voice change. Negative for drooling and sore throat.     Eyes: Negative for discharge and itching.   Respiratory: Positive for shortness of breath.    Cardiovascular: Negative for chest pain.   Gastrointestinal: Negative for abdominal pain.   Genitourinary: Negative for dysuria.   Musculoskeletal: Negative for neck stiffness.   Skin: Negative for rash.   Neurological: Negative for headaches.       Physical Exam     Initial Vitals [12/18/19 0237]   BP Pulse Resp Temp SpO2   (!) 150/93 99 18 98.2 °F (36.8 °C) 98 %      MAP       --         Physical Exam    Nursing note and vitals reviewed.  Constitutional: He appears well-developed and well-nourished. He is not diaphoretic.   Awake, alert, nontoxic, speaking in complete sentences.    HENT:   Head: Normocephalic and atraumatic.   Uvular edema. Uvula midline. No exudate. No tongue elevation. No lip swelling.   Eyes: Conjunctivae and EOM are normal. Pupils are equal, round, and reactive to light.   Neck: Normal range of motion. Neck supple.   Cardiovascular: Normal rate, regular rhythm, normal heart sounds and intact distal pulses.   No murmur heard.  Pulmonary/Chest: Breath sounds normal. No respiratory distress. He has no wheezes. He has no rhonchi. He has no rales.   Abdominal: Soft. There is no tenderness.   Musculoskeletal: Normal range of motion. He exhibits edema (1+ bilat LE). He exhibits no tenderness.   Neurological: He is alert and oriented to person, place, and time. He has normal strength.   Moving all extremities   Skin: Skin is warm and dry.   Psychiatric: He has a normal mood and affect.         ED Course   Procedures  Labs Reviewed   COMPREHENSIVE METABOLIC PANEL - Abnormal; Notable for the following components:       Result Value    Glucose 132 (*)     All other components within normal limits   CBC W/ AUTO DIFFERENTIAL          Imaging Results    None          Medical Decision Making:   History:   Old Medical Records: I decided to obtain old medical records.  Old Records Summarized: records from previous  admission(s) and records from clinic visits.  Initial Assessment:   59 y.o. Male with acute swelling to back of throat. On lisinopril.  Differential Diagnosis:   Ddx includes ACEi induced angioedema, PTA, epiglottitis, tonsillitis, other.  Clinical Tests:   Lab Tests: Ordered and Reviewed  ED Management:  Patient had solumedrol 125mg IV, benadryl 25mg IV, pepcid 20mg IV. He later had flonase for sinus drainage.    On reexam, uvula is still enlarged. No additional airway or facial swelling.    I suspect isolated uvula swelling is angioedema induced by ACEi. I have advised patient to avoid lisinopril, captopril, enalapril, etc in the future. I have added this to his allergy list.    Patient requires close monitoring of airway pending improvement in uvular swelling. I have placed him in OBS to ICU for this. I have discussed his case with Dr. Conteh, krysten. I have written courtesy orders.  Other:   I have discussed this case with another health care provider.                                 Clinical Impression:       ICD-10-CM ICD-9-CM   1. Angioedema, initial encounter T78.3XXA 995.1   2. Uvular edema K13.79 528.9   3. Dysphagia, unspecified type R13.10 787.20   4. Change in voice R49.9 784.49                             Ariane Wiggins MD  12/18/19 0603

## 2019-12-18 NOTE — HOSPITAL COURSE
Placed in observation after angioedema episode which is unclear for etiology bradykinin/Ace inhibitor vs seafood. He responded well to triple therapy which leans toward seafood or other mast cell mediated response. He has been on lisinopril for many years but has been non-compliant. He reports not taking his lisinopril since Monday 12/16/2019 and this episode began just prior to admit on 12/18/2019.  Did not take a lisinopril until after the episode began in the early morning just prior to presentation - He did this because he said he feared that the episode was because of his blood pressure.  His uvula is patent at this time, midline, slight redness but no swelling noted. Sed rate mildly elevated at 70 but CRP within normal limits.  No fever leukocytosis.  Renal functions and liver functions appear normal.  Patient and his wife have both been instructed to stop NSAIDs, ACE inhibitors, and seafood until seen by  Immunologist. C1/C4 complements ordered but are send outs and will likely not result before discharge. The patient and his wife has been instructed to check their [email or web address] account for result that can be printed or accessed to show the specialist when they follow up in clinic. He will discharge with epipen/H2 blocker/H1 histamine/steroid

## 2019-12-18 NOTE — DISCHARGE SUMMARY
[facility] Hospital Medicine  Discharge Summary      Patient Name: Reagan Carbajal  MRN: [id number]  Admission Date: 12/18/2019  Hospital Length of Stay: 0 days  Discharge Date and Time:  12/18/2019 2:53 PM  Attending Physician: Cortney Acosta MD   Discharging Provider: NATHANIEL Gamez  Primary Care Provider: Carmen Cosby MD      HPI:   Reagan Carbajal is a 59 y.o. AAM with PMHx including HTN, SHARAN, Obesity and Gout. Per the patient and his wife who is at the bedside, the patient awaken about 2 am in the morning stating that he felt like his tonsils were laying on his tongue, with associated difficulty breathing. He awaken his wife who brought him to ED. Similar events in the past X2 which were not as severe and were transient. Last time about 1 year ago that spontaneously resolved without treatment. Prior to that episode he had one about 6 months before. He did not take anything at home for relief. 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 Also took lisinopril last 2 days ago on Monday 12/16/19 and forgot to take it on Tuesday. He did take the lisinopril prior to presentation, however, he took the medication after he stated having the above symptoms, states he thought the symptoms were from his blood pressure so he took his lisinopril after the epidsode of edema began. He denies taking any OTC NSAIDS.    White count normal, lytes normal, sed rate 20, crp normal    * No surgery found *      Hospital Course:   Placed in observation after angioedema episode which is unclear for etiology bradykinin/Ace inhibitor vs seafood. He responded well to triple therapy which leans toward seafood or other mast cell mediated response. He has been on lisinopril for many years but has been non-compliant. He reports not taking his lisinopril since Monday 12/16/2019 and this episode began just prior to admit on 12/18/2019.  Did not  take a lisinopril until after the episode began in the early morning just prior to presentation - He did this because he said he feared that the episode was because of his blood pressure.  His uvula is patent at this time, midline, slight redness but no swelling noted. Sed rate mildly elevated at 70 but CRP within normal limits.  No fever leukocytosis.  Renal functions and liver functions appear normal.  Patient and his wife have both been instructed to stop NSAIDs, ACE inhibitors, and seafood until seen by  Immunologist. C1/C4 complements ordered but are send outs and will likely not result before discharge. The patient and his wife has been instructed to check their [email or web address] account for result that can be printed or accessed to show the specialist when they follow up in clinic. He will discharge with epipen/H2 blocker/H1 histamine/steroid         Final Active Diagnoses:    Diagnosis Date Noted POA    PRINCIPAL PROBLEM:  Angio-edema [T78.3XXA] 12/18/2019 Yes    HTN (hypertension) [I10] 11/12/2012 Yes    Obesity, morbid, BMI 40.0-49.9 [E66.01] 06/12/2014 Yes     Chronic    Gout [M10.9] 09/13/2018 Yes      Problems Resolved During this Admission:       Discharged Condition: stable    Disposition: Home or Self Care    Follow Up:  Follow-up Information     Carmen Cosby MD.    Specialty:  Family Medicine  Contact information:  [address]  [address]  [facility] [address] [phone]             [facility] - Allergy/ Immunology.    Specialty:  Allergy  Why:  Call the office to schedule appointment, For outpatient follow-up/post hospitalizaton  Contact information:  [address] [facility] [address] [phone]  Additional information:  2nd floor               Patient Instructions:      Diet Cardiac     Activity as tolerated       Significant Diagnostic Studies: Labs:   CMP   Recent Labs   Lab 12/18/19  0300      K 3.9      CO2 27   *   BUN 17    CREATININE 1.0   CALCIUM 9.4   PROT 7.1   ALBUMIN 3.9   BILITOT 0.5   ALKPHOS 92   AST 16   ALT 22   ANIONGAP 9   ESTGFRAFRICA >60   EGFRNONAA >60   , CBC   Recent Labs   Lab 12/18/19  0300   WBC 8.76   HGB 14.1   HCT 43.6      , INR No results found for: INR, PROTIME, Lipid Panel   Lab Results   Component Value Date    CHOL 205 (H) 07/09/2019    HDL 34 (L) 07/09/2019    LDLCALC 120.0 07/09/2019    TRIG 255 (H) 07/09/2019    CHOLHDL 16.6 (L) 07/09/2019   , Troponin No results for input(s): TROPONINI in the last 168 hours. and A1C: No results for input(s): HGBA1C in the last 4320 hours.    Pending Diagnostic Studies:     Procedure Component Value Units Date/Time    C1 esterase inhibitor panel [[id number]] Collected:  12/18/19 0912    Order Status:  Sent Lab Status:  In process Updated:  12/18/19 0912    Specimen:  Blood     C1 esterase inhibitor, functional [[id number]] Collected:  12/18/19 0912    Order Status:  Sent Lab Status:  In process Updated:  12/18/19 0912    Specimen:  Blood     C4 complement [[id number]] Collected:  12/18/19 0912    Order Status:  Sent Lab Status:  In process Updated:  12/18/19 0912    Specimen:  Blood          Medications:  Reconciled Home Medications:      Medication List      START taking these medications    cetirizine 10 MG tablet  Commonly known as:  ZYRTEC  Take 1 tablet (10 mg total) by mouth 2 (two) times daily. for 7 days     EPINEPHrine 0.15 mg/0.15 mL Atin  Inject 0.3 mLs (0.3 mg total) into the muscle as needed (Administer into outer aspect of thigh. May repeat one time in 15 minutes if symptoms persist).     famotidine 20 MG tablet  Commonly known as:  PEPCID  Take 1 tablet (20 mg total) by mouth 2 (two) times daily.     predniSONE 20 MG tablet  Commonly known as:  DELTASONE  Prednisone 60 mg X 5 days; 50 mg X 1 day; 40 mg X 1 Day; 30 mg X 1 Day; 20 Mg X 1 Day then STOP!!     verapamil 40 MG Tab  Commonly known as:  CALAN  Take 1 tablet (40 mg total) by mouth 2 (two)  times daily.        CONTINUE taking these medications    ALPRAZolam 1 MG tablet  Commonly known as:  XANAX  TAKE 1 TABLET BY MOUTH AT BEDTIME AS NEEDED FOR ANXIETY     hyoscyamine 0.125 mg Tab  Commonly known as:  ANASPAZ,LEVSIN  Take 1 tablet (125 mcg total) by mouth every 6 (six) hours as needed.     lovastatin 40 MG tablet  Commonly known as:  MEVACOR  TAKE ONE TABLET BY MOUTH NIGHTLY     mupirocin 2 % ointment  Commonly known as:  BACTROBAN  Apply topically 3 (three) times daily.        STOP taking these medications    furosemide 20 MG tablet  Commonly known as:  LASIX     indomethacin 50 MG capsule  Commonly known as:  INDOCIN     omeprazole 40 MG capsule  Commonly known as:  PRILOSEC            Indwelling Lines/Drains at time of discharge:   Lines/Drains/Airways     None                 Time spent on the discharge of patient: 35 minutes  Patient was seen and examined on the date of discharge and determined to be suitable for discharge.    Critical care time spent on the evaluation and treatment of severe organ dysfunction, review of pertinent labs and imaging studies, discussions with consulting providers and discussions with patient/family: 40 minutes.         CHELE Paez, FNP-C  Hospitalist - Department of Hospital Medicine  [address] [place] [address]  Office [phone]; Pager [phone]        Addendum; 108 P  Spoke with patient and his wife over the phone to clarify discharge orders.  He had questions about why the Lasix was discharge. He also had a change in his memory about when and how he had taken his lisinopril and his Lasix.  Patient endorses noncompliance with his meds and there is some discrepancy about round when the lisinopril was last taken.  He had originally reported that he had last taken the lisinopril on Monday prior to the episode.  No other lisinopril had been taking according to him until after the episode started when he thought was his  blood pressure that was making him short of breath and his throat closing.  He now reports that he thinks he took his lisinopril on Tuesday as well.  Etiology of patient's angioedema episode remains unclear.  As he also endorse that he ate fish prior to lying down for sleep, however he does not know what kind fish he ate.  The patient has been instructed clearly to refrain from lisinopril, Lasix, NSAIDs described as Motrin and Aleve over-the-counter, and fish until seen by immunology.  Labs were taken at the hospital and are send out and are pending including C1/C4.  I instructed him on directions again on how to access his labs via the [facility]. He is also instructed to call immunology first thing in the morning.    CHELE Paez, FNP-C  Hospitalist - Department of Hospital Medicine  [address], [place] [address]  Office [phone]; Pager [phone]

## 2019-12-18 NOTE — H&P
[facility] Hospital Medicine  History & Physical    Patient Name: Reagan Carbajal  MRN: [id number]  Admission Date: 12/18/2019  Attending Physician: Cortney Acosta MD   Primary Care Provider: Carmen Cosby MD         Patient information was obtained from patient and ER records.     Subjective:     Principal Problem:Angio-edema    Chief Complaint:   Chief Complaint   Patient presents with    Oral Swelling     patient states something is coming up is throat and he can't breathe        HPI: Reagan Carbajal is a 59 y.o. AAM with PMHx including HTN, SHARAN, Obesity and Gout. Per the patient and his wife who is at the bedside, the patient awaken about 2 am in the morning stating that he felt like his tonsils were laying on his tongue, with associated difficulty breathing. He awaken his wife who brought him to ED. Similar events in the past X2 which were not as severe and were transient. Last time about 1 year ago that spontaneously resolved without treatment. Prior to that episode he had one about 6 months before. He did not take anything at home for relief. 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 Also took lisinopril last 2 days ago on Monday 12/16/19 and forgot to take it on Tuesday. He did take the lisinopril prior to presentation, however, he took the medication after he stated having the above symptoms, states he thought the symptoms were from his blood pressure so he took his lisinopril after the epidsode of edema began. He denies taking any OTC NSAIDS.    White count normal, lytes normal, sed rate 20, crp normal    Past Medical History:   Diagnosis Date    Anxiety     Colon polyp     CPAP (continuous positive airway pressure) dependence     does not use    GERD (gastroesophageal reflux disease)     Gout 9/13/2018    HTN (hypertension) 11/12/2012    Hyperlipidemia     Insomnia with sleep apnea     Obesity,  morbid, BMI 40.0-49.9 2014       Past Surgical History:   Procedure Laterality Date    COLONOSCOPY N/A 5/3/2016    Procedure: COLONOSCOPY;  Surgeon: Chance Glynn MD;  Location: [facility] ([address] [place] [address]);  Service: Endoscopy;  Laterality: N/A;    COLONOSCOPY W/ POLYPECTOMY         Review of patient's allergies indicates:   Allergen Reactions    Tramadol      Nausea      Triamterene        No current facility-administered medications on file prior to encounter.      Current Outpatient Medications on File Prior to Encounter   Medication Sig    furosemide (LASIX) 20 MG tablet Take 1 tablet (20 mg total) by mouth once daily.    lovastatin (MEVACOR) 40 MG tablet TAKE ONE TABLET BY MOUTH NIGHTLY    [DISCONTINUED] lisinopril (PRINIVIL,ZESTRIL) 40 MG tablet Take 1 tablet (40 mg total) by mouth once daily.    ALPRAZolam (XANAX) 1 MG tablet TAKE 1 TABLET BY MOUTH AT BEDTIME AS NEEDED FOR ANXIETY    hyoscyamine (ANASPAZ,LEVSIN) 0.125 mg Tab Take 1 tablet (125 mcg total) by mouth every 6 (six) hours as needed.    indomethacin (INDOCIN) 50 MG capsule Take 1 capsule (50 mg total) by mouth 3 (three) times daily as needed (Gout pain).    mupirocin (BACTROBAN) 2 % ointment Apply topically 3 (three) times daily.    omeprazole (PRILOSEC) 40 MG capsule Take 1 capsule (40 mg total) by mouth once daily.     Family History     Problem Relation (Age of Onset)    Heart disease Mother    Hepatitis Brother    Hyperlipidemia Mother    Hypertension Mother, Father    Lymphoma Mother        Tobacco Use    Smoking status: Former Smoker     Types: Cigarettes, Cigars     Last attempt to quit: 5/3/1993     Years since quittin.6    Smokeless tobacco: Never Used   Substance and Sexual Activity    Alcohol use: No     Alcohol/week: 0.0 standard drinks    Drug use: No    Sexual activity: Yes     Partners: Female     Birth control/protection: None     Review of Systems   Constitutional: Positive for appetite change. Negative for chills,  diaphoresis and fever.   HENT: Positive for trouble swallowing and voice change. Negative for congestion, facial swelling, hearing loss, mouth sores, postnasal drip, sore throat and tinnitus.    Eyes: Negative for photophobia, discharge, itching and visual disturbance.   Respiratory: Positive for shortness of breath. Negative for apnea, cough, wheezing and stridor.    Cardiovascular: Negative for chest pain, palpitations and leg swelling.   Gastrointestinal: Negative for abdominal distention, abdominal pain, blood in stool, constipation, diarrhea and nausea.   Endocrine: Negative for polydipsia, polyphagia and polyuria.   Genitourinary: Negative for difficulty urinating, dysuria, flank pain and frequency.   Musculoskeletal: Negative for arthralgias, joint swelling and neck stiffness.   Skin: Negative for color change, rash and wound.   Neurological: Positive for weakness. Negative for dizziness, tremors, seizures, light-headedness, numbness and headaches.   Hematological: Negative for adenopathy.   Psychiatric/Behavioral: Negative for hallucinations and self-injury.     Objective:     Vital Signs (Most Recent):  Temp: 97.7 °F (36.5 °C) (12/18/19 1200)  Pulse: 83 (12/18/19 1200)  Resp: (!) 22 (12/18/19 1200)  BP: (!) 158/70 (12/18/19 1200)  SpO2: (!) 94 % (12/18/19 1200) Vital Signs (24h Range):  Temp:  [97.7 °F (36.5 °C)-98.3 °F (36.8 °C)] 97.7 °F (36.5 °C)  Pulse:  [77-99] 83  Resp:  [18-28] 22  SpO2:  [92 %-98 %] 94 %  BP: (139-161)/(59-93) 158/70     Weight: (!) 156.4 kg (344 lb 12.8 oz)  Body mass index is 46.75 kg/m².    Physical Exam   Constitutional: He is oriented to person, place, and time. He appears well-developed and well-nourished. He is cooperative.   HENT:   Head: Normocephalic and atraumatic.   Ulnar midline without swelling noted; scant redness without exudate    Eyes: Pupils are equal, round, and reactive to light. Conjunctivae, EOM and lids are normal.   Neck: Normal range of motion and full  passive range of motion without pain. Neck supple. No JVD present. No edema present. No thyroid mass present.   Cardiovascular: Normal rate, S1 normal, S2 normal and intact distal pulses.   No murmur heard.  Pulmonary/Chest: Effort normal.   Abdominal: Soft. Bowel sounds are normal. He exhibits no distension and no abdominal bruit. There is no splenomegaly or hepatomegaly. There is no tenderness. There is no CVA tenderness.   Lymphadenopathy:     He has no cervical adenopathy.     He has no axillary adenopathy.   Neurological: He is alert and oriented to person, place, and time. He has normal reflexes. He displays no tremor. He displays no seizure activity.   Skin: Skin is warm, dry and intact.   Psychiatric: He has a normal mood and affect. His speech is normal. Thought content normal. Cognition and memory are normal.         CRANIAL NERVES     CN III, IV, VI   Pupils are equal, round, and reactive to light.  Extraocular motions are normal.        Significant Labs:   CBC:   Recent Labs   Lab 12/18/19  0300   WBC 8.76   HGB 14.1   HCT 43.6        CMP:   Recent Labs   Lab 12/18/19  0300      K 3.9      CO2 27   *   BUN 17   CREATININE 1.0   CALCIUM 9.4   PROT 7.1   ALBUMIN 3.9   BILITOT 0.5   ALKPHOS 92   AST 16   ALT 22   ANIONGAP 9   EGFRNONAA >60     All pertinent labs within the past 24 hours have been reviewed.    Significant Imaging: I have reviewed all pertinent imaging results/findings within the past 24 hours.    Assessment/Plan:     * Angio-edema  Unclear etiology - he has been taking ACEI for years as well as eating seafood. He sates that he had 2 prior events over the past 1.5 that were not as severe. He did not take his lisinopril for 2 days prior to this event. He did consume seafood just before bed. C1/C4 complements ordered but will likely be delayed as they are send outs. Inflammatory markers obtained, sed rate mildly elevated, crp normal. He is non-distressed appearing. He  states that he has some awkward mouth movements at baseline due to ill fitting dentures.  -mechanical soft diet  -will monitor until this afternoon; will likely discharge after next dose of IV steroids and dual coverage/treatment with H2 blocker/antihistimine/steroid  -Home with epi pen  -refer      HTN (hypertension)  Non-compliant - stop lisinopril and hctz - start verapamil for BP control - monitor      Obesity, morbid, BMI 40.0-49.9  Patient counseled for 10 minutes on the dangers of unhealthy lifestyle choices including unhealthy eating habits, long term risk of inappropriate choices, as well as advantages to behavior modification.  - Continue counseling per nursing  -Nursing [employer] Diet resources          Gout  No evidence of flare - takes indocin - stop NSAIDS for now until fu in clinic with immunologist for further workup        VTE Risk Mitigation (From admission, onward)         Ordered     enoxaparin injection 40 mg  Daily      12/18/19 0809     IP VTE HIGH RISK PATIENT  Once      12/18/19 0809     Place HEAVEN hose  Until discontinued      12/18/19 0809     Place sequential compression device  Until discontinued      12/18/19 0809              Critical care time spent on the evaluation and treatment of severe organ dysfunction, review of pertinent labs and imaging studies, discussions with consulting providers and discussions with patient/family: 35 minutes.       CHELE Paez, FNP-C  Hospitalist - Department of Hospital Medicine  [address], [place] [address]  Office [phone]; Pager [phone]

## 2019-12-19 ENCOUNTER — OFFICE VISIT (OUTPATIENT)
Dept: PRIMARY CARE CLINIC | Facility: CLINIC | Age: 59
End: 2019-12-19
Payer: COMMERCIAL

## 2019-12-19 VITALS
TEMPERATURE: 99 F | WEIGHT: 315 LBS | OXYGEN SATURATION: 97 % | HEIGHT: 72 IN | BODY MASS INDEX: 42.66 KG/M2 | SYSTOLIC BLOOD PRESSURE: 156 MMHG | HEART RATE: 80 BPM | DIASTOLIC BLOOD PRESSURE: 74 MMHG

## 2019-12-19 DIAGNOSIS — K13.79 UVULAR HYPERTROPHY: ICD-10-CM

## 2019-12-19 DIAGNOSIS — F51.02 TRANSIENT INSOMNIA: ICD-10-CM

## 2019-12-19 DIAGNOSIS — I10 HYPERTENSION, UNSPECIFIED TYPE: Primary | ICD-10-CM

## 2019-12-19 PROCEDURE — 3008F PR BODY MASS INDEX (BMI) DOCUMENTED: ICD-10-PCS | Mod: CPTII,S$GLB,, | Performed by: FAMILY MEDICINE

## 2019-12-19 PROCEDURE — 3077F SYST BP >= 140 MM HG: CPT | Mod: CPTII,S$GLB,, | Performed by: FAMILY MEDICINE

## 2019-12-19 PROCEDURE — 99999 PR PBB SHADOW E&M-EST. PATIENT-LVL IV: CPT | Mod: PBBFAC,,, | Performed by: FAMILY MEDICINE

## 2019-12-19 PROCEDURE — 3078F PR MOST RECENT DIASTOLIC BLOOD PRESSURE < 80 MM HG: ICD-10-PCS | Mod: CPTII,S$GLB,, | Performed by: FAMILY MEDICINE

## 2019-12-19 PROCEDURE — 3078F DIAST BP <80 MM HG: CPT | Mod: CPTII,S$GLB,, | Performed by: FAMILY MEDICINE

## 2019-12-19 PROCEDURE — 3008F BODY MASS INDEX DOCD: CPT | Mod: CPTII,S$GLB,, | Performed by: FAMILY MEDICINE

## 2019-12-19 PROCEDURE — 99214 OFFICE O/P EST MOD 30 MIN: CPT | Mod: S$GLB,,, | Performed by: FAMILY MEDICINE

## 2019-12-19 PROCEDURE — 99999 PR PBB SHADOW E&M-EST. PATIENT-LVL IV: ICD-10-PCS | Mod: PBBFAC,,, | Performed by: FAMILY MEDICINE

## 2019-12-19 PROCEDURE — 99214 PR OFFICE/OUTPT VISIT, EST, LEVL IV, 30-39 MIN: ICD-10-PCS | Mod: S$GLB,,, | Performed by: FAMILY MEDICINE

## 2019-12-19 PROCEDURE — 3077F PR MOST RECENT SYSTOLIC BLOOD PRESSURE >= 140 MM HG: ICD-10-PCS | Mod: CPTII,S$GLB,, | Performed by: FAMILY MEDICINE

## 2019-12-19 RX ORDER — ALPRAZOLAM 1 MG/1
1 TABLET ORAL NIGHTLY PRN
Qty: 30 TABLET | Refills: 5 | Status: SHIPPED | OUTPATIENT
Start: 2019-12-19 | End: 2020-07-15

## 2019-12-19 RX ORDER — ASPIRIN 81 MG/1
81 TABLET ORAL DAILY
COMMUNITY

## 2019-12-19 RX ORDER — VERAPAMIL HYDROCHLORIDE 80 MG/1
80 TABLET ORAL 2 TIMES DAILY
Qty: 60 TABLET | Refills: 11 | Status: SHIPPED | OUTPATIENT
Start: 2019-12-19 | End: 2020-01-22

## 2019-12-19 NOTE — CARE UPDATE
"Spoke with the patient after visit.    He now reports that he thinks that he remembers taking his lisinopril Monday and Tuesday which is different from what he reported earlier that he did not take his lisinopril or lasix on Tuesday. He states that he also thinks he may have taken his lasix too. He states that it is hard to remember because he "works so much" I discussed in great detail over 40 minutes in the early morning and another 22 minutes over the telephone post discharge the plan. He was counseled on the fact that it is difficult to determine what may have caused the reaction therefore he should refrain from using any of the items discussed until his workup is complete. Failing to do so may result in super reaction and intubation.    Things discussed and clarified with the patient again via telephone  -He has a known allergy to triamterene and tramadol which are both listed on his record  -Therefore until he sees immunology outpatient the following will be discontinued  -Lasix, lisinopril, Do not take aleve or motrin OTC or any medication like this, Do not eat fish of any kind until you see the immunologist  -You can access the [email or web address] to get your test results C1/C4  -Veraprimil was ordered for BP control (which he had requested) to use until his workup is completed and his doctor can safely transition him to something else.  -He states he will sign up for [facility] tonight.  -Will route discharge summary to [facility] via epic  -He must call in the morning to arrange his follow up appointment for the immunology clinic.        CHELE Paez, FNP-C  Hospitalist - Department of Hospital Medicine  [address], [place] [address]  Office [phone]; Pager [phone]    "

## 2019-12-20 NOTE — PROGRESS NOTES
Transitional Care Note  Subjective:       Patient ID: Reagan Carbajal is a 59 y.o. male.  Chief Complaint: Transitional Care (ER follow up) and Blurred Vision    Family and/or Caretaker present at visit?  No.  Diagnostic tests reviewed/disposition: No diagnosic tests pending after this hospitalization.  Disease/illness education:  Angioedema secondary to medication  Home health/community services discussion/referrals: Patient does not have home health established from hospital visit.  They do not need home health.  If needed, we will set up home health for the patient.   Establishment or re-establishment of referral orders for community resources: No other necessary community resources.   Discussion with other health care providers: No discussion with other health care providers necessary.   HPI  Review of Systems    Objective:      Physical Exam   Constitutional: He is oriented to person, place, and time. He appears well-developed and well-nourished. He appears distressed.   HENT:   Head: Normocephalic and atraumatic.   Right Ear: External ear normal.   Left Ear: External ear normal.   Nose: Nose normal.   Mouth/Throat: Mucous membranes are normal. Uvula swelling present. Posterior oropharyngeal edema present.       Patient's uvula is swollen and elongated   Eyes: Pupils are equal, round, and reactive to light. Conjunctivae and EOM are normal.   Neck: Normal range of motion. Neck supple. No JVD present.   Cardiovascular: Normal rate, regular rhythm, normal heart sounds and intact distal pulses.   Pulmonary/Chest: Effort normal and breath sounds normal. No respiratory distress.   Abdominal: Soft. Bowel sounds are normal. He exhibits no distension.   Musculoskeletal: Normal range of motion. He exhibits no edema, tenderness or deformity.   Neurological: He is alert and oriented to person, place, and time. He displays normal reflexes. No cranial nerve deficit or sensory deficit. He exhibits normal muscle tone.  Coordination normal.   Skin: Skin is warm and dry. Capillary refill takes less than 2 seconds. No rash noted. No erythema. No pallor.   Psychiatric: His speech is normal and behavior is normal. Judgment and thought content normal. His mood appears anxious. Cognition and memory are normal.   Nursing note and vitals reviewed.      Assessment:       1. Hypertension, unspecified type    2. Uvular hypertrophy    3. Transient insomnia        Plan:     Reagan was seen today for transitional care and blurred vision.    Diagnoses and all orders for this visit:    Hypertension, unspecified type  -     verapamil (CALAN) 80 MG tablet; Take 1 tablet (80 mg total) by mouth 2 (two) times daily.    Uvular hypertrophy  -     Ambulatory referral to ENT    Transient insomnia  -     ALPRAZolam (XANAX) 1 MG tablet; Take 1 tablet (1 mg total) by mouth nightly as needed.     patient follow-up in 2-3 weeks to re-evaluate symptoms and blood pressure

## 2019-12-26 ENCOUNTER — TELEPHONE (OUTPATIENT)
Dept: PRIMARY CARE CLINIC | Facility: CLINIC | Age: 59
End: 2019-12-26

## 2019-12-26 NOTE — TELEPHONE ENCOUNTER
Please see message and advise     Spoke with pt and was advised that he was taken  off of his fluid medication when he went into the hospital but pt is feeling that he needs  to be taking something to help with the  retaining of water. Pt wanted to know if he should go back to taking the old fluid pill or can the provider prescribe something new for him to take.     ----- Message from Ariane Jameson sent at 12/26/2019 11:05 AM CST -----  Contact: [phone]  Type: Rx    Name of medication(s): fluid medication     Is this a refill? New rx? New       Pharmacy Name, Phone, & Location:[facility] [address] [place] [address] ([facility], [address]   [phone] (Phone)  [phone] (Fax)      Comments:please advise, thanks

## 2019-12-27 DIAGNOSIS — R60.9 EDEMA, UNSPECIFIED TYPE: Primary | ICD-10-CM

## 2019-12-27 RX ORDER — FUROSEMIDE 40 MG/1
40 TABLET ORAL DAILY PRN
Qty: 30 TABLET | Refills: 11 | Status: SHIPPED | OUTPATIENT
Start: 2019-12-27 | End: 2022-10-25

## 2020-01-09 LAB — C1INH SERPL-MCNC: 41 MG/DL (ref 21–39)

## 2020-01-20 ENCOUNTER — NURSE TRIAGE (OUTPATIENT)
Dept: ADMINISTRATIVE | Facility: CLINIC | Age: 60
End: 2020-01-20

## 2020-01-21 DIAGNOSIS — R10.9 ABDOMINAL PAIN, UNSPECIFIED ABDOMINAL LOCATION: ICD-10-CM

## 2020-01-21 RX ORDER — HYOSCYAMINE SULFATE 0.125 MG
125 TABLET ORAL EVERY 6 HOURS PRN
Qty: 40 TABLET | Refills: 2 | Status: SHIPPED | OUTPATIENT
Start: 2020-01-21 | End: 2021-02-11 | Stop reason: SDUPTHER

## 2020-01-21 NOTE — TELEPHONE ENCOUNTER
2 attempts made, LVM on first attempt    Reason for Disposition   Message left on unidentified voice mail.  Phone number verified.    Protocols used: NO CONTACT OR DUPLICATE CONTACT CALL-A-AH

## 2020-01-21 NOTE — TELEPHONE ENCOUNTER
Pt is requesting refill on medication     LOV  12/19/19    ----- Message from Gunjan Roberts sent at 1/21/2020  4:51 PM CST -----  Contact: self   Patient is calling for an RX refill or new RX.  Is this a refill or new RX:  new  RX name and strength: hyoscyamine (ANASPAZ,LEVSIN) 0.125 mg Tab  Directions (copy/paste from chart):   Take 1 tablet (125 mcg total) by mouth every 6 (six) hours as needed  Is this a 30 day or 90 day RX:    Local pharmacy or mail order pharmacy:  local  Pharmacy name and phone # (copy/paste from chart):   [facility] [address] [place] [address] ([facility], [address] [phone] (Phone)  [phone] (Fax)  Comments:  Pt states he is having a gout flare up

## 2020-01-22 ENCOUNTER — HOSPITAL ENCOUNTER (OUTPATIENT)
Dept: RADIOLOGY | Facility: HOSPITAL | Age: 60
Discharge: HOME | End: 2020-01-22
Attending: FAMILY MEDICINE
Payer: COMMERCIAL

## 2020-01-22 ENCOUNTER — OFFICE VISIT (OUTPATIENT)
Dept: PRIMARY CARE CLINIC | Facility: CLINIC | Age: 60
End: 2020-01-22
Payer: COMMERCIAL

## 2020-01-22 VITALS
TEMPERATURE: 98 F | BODY MASS INDEX: 42.66 KG/M2 | DIASTOLIC BLOOD PRESSURE: 90 MMHG | WEIGHT: 315 LBS | SYSTOLIC BLOOD PRESSURE: 168 MMHG | HEIGHT: 72 IN | HEART RATE: 44 BPM

## 2020-01-22 DIAGNOSIS — M79.604 LEG PAIN, CENTRAL, RIGHT: Primary | ICD-10-CM

## 2020-01-22 DIAGNOSIS — M54.30 SCIATICA, UNSPECIFIED LATERALITY: ICD-10-CM

## 2020-01-22 DIAGNOSIS — M79.604 LEG PAIN, CENTRAL, RIGHT: ICD-10-CM

## 2020-01-22 DIAGNOSIS — M25.361 KNEE BUCKLING, RIGHT: ICD-10-CM

## 2020-01-22 DIAGNOSIS — I10 HYPERTENSION, UNSPECIFIED TYPE: ICD-10-CM

## 2020-01-22 PROCEDURE — 3008F BODY MASS INDEX DOCD: CPT | Mod: CPTII,S$GLB,, | Performed by: FAMILY MEDICINE

## 2020-01-22 PROCEDURE — 99214 OFFICE O/P EST MOD 30 MIN: CPT | Mod: 25,S$GLB,, | Performed by: FAMILY MEDICINE

## 2020-01-22 PROCEDURE — 73560 X-RAY EXAM OF KNEE 1 OR 2: CPT | Mod: TC,PN,RT

## 2020-01-22 PROCEDURE — 72170 X-RAY EXAM OF PELVIS: CPT | Mod: 26,,, | Performed by: RADIOLOGY

## 2020-01-22 PROCEDURE — 72170 XR PELVIS ROUTINE AP: ICD-10-PCS | Mod: 26,,, | Performed by: RADIOLOGY

## 2020-01-22 PROCEDURE — 96372 PR INJECTION,THERAP/PROPH/DIAG2ST, IM OR SUBCUT: ICD-10-PCS | Mod: S$GLB,,, | Performed by: FAMILY MEDICINE

## 2020-01-22 PROCEDURE — 99999 PR PBB SHADOW E&M-EST. PATIENT-LVL V: ICD-10-PCS | Mod: PBBFAC,,, | Performed by: FAMILY MEDICINE

## 2020-01-22 PROCEDURE — 99999 PR PBB SHADOW E&M-EST. PATIENT-LVL V: CPT | Mod: PBBFAC,,, | Performed by: FAMILY MEDICINE

## 2020-01-22 PROCEDURE — 3080F PR MOST RECENT DIASTOLIC BLOOD PRESSURE >= 90 MM HG: ICD-10-PCS | Mod: CPTII,S$GLB,, | Performed by: FAMILY MEDICINE

## 2020-01-22 PROCEDURE — 72100 XR LUMBAR SPINE AP AND LATERAL: ICD-10-PCS | Mod: 26,,, | Performed by: RADIOLOGY

## 2020-01-22 PROCEDURE — 3077F PR MOST RECENT SYSTOLIC BLOOD PRESSURE >= 140 MM HG: ICD-10-PCS | Mod: CPTII,S$GLB,, | Performed by: FAMILY MEDICINE

## 2020-01-22 PROCEDURE — 73560 XR KNEE 1 OR 2 VIEW RIGHT: ICD-10-PCS | Mod: 26,RT,, | Performed by: RADIOLOGY

## 2020-01-22 PROCEDURE — 72170 X-RAY EXAM OF PELVIS: CPT | Mod: TC,PN

## 2020-01-22 PROCEDURE — 72100 X-RAY EXAM L-S SPINE 2/3 VWS: CPT | Mod: TC,PN

## 2020-01-22 PROCEDURE — 3080F DIAST BP >= 90 MM HG: CPT | Mod: CPTII,S$GLB,, | Performed by: FAMILY MEDICINE

## 2020-01-22 PROCEDURE — 73560 X-RAY EXAM OF KNEE 1 OR 2: CPT | Mod: 26,RT,, | Performed by: RADIOLOGY

## 2020-01-22 PROCEDURE — 72100 X-RAY EXAM L-S SPINE 2/3 VWS: CPT | Mod: 26,,, | Performed by: RADIOLOGY

## 2020-01-22 PROCEDURE — 3077F SYST BP >= 140 MM HG: CPT | Mod: CPTII,S$GLB,, | Performed by: FAMILY MEDICINE

## 2020-01-22 PROCEDURE — 99214 PR OFFICE/OUTPT VISIT, EST, LEVL IV, 30-39 MIN: ICD-10-PCS | Mod: 25,S$GLB,, | Performed by: FAMILY MEDICINE

## 2020-01-22 PROCEDURE — 96372 THER/PROPH/DIAG INJ SC/IM: CPT | Mod: S$GLB,,, | Performed by: FAMILY MEDICINE

## 2020-01-22 PROCEDURE — 3008F PR BODY MASS INDEX (BMI) DOCUMENTED: ICD-10-PCS | Mod: CPTII,S$GLB,, | Performed by: FAMILY MEDICINE

## 2020-01-22 RX ORDER — DICLOFENAC SODIUM 50 MG/1
50 TABLET, DELAYED RELEASE ORAL 2 TIMES DAILY
Qty: 40 TABLET | Refills: 1 | Status: SHIPPED | OUTPATIENT
Start: 2020-01-22 | End: 2023-05-19 | Stop reason: ALTCHOICE

## 2020-01-22 RX ORDER — METHYLPREDNISOLONE 4 MG/1
TABLET ORAL
Qty: 1 PACKAGE | Refills: 0 | Status: SHIPPED | OUTPATIENT
Start: 2020-01-22 | End: 2020-01-22

## 2020-01-22 RX ORDER — KETOROLAC TROMETHAMINE 30 MG/ML
60 INJECTION, SOLUTION INTRAMUSCULAR; INTRAVENOUS
Status: COMPLETED | OUTPATIENT
Start: 2020-01-22 | End: 2020-01-22

## 2020-01-22 RX ORDER — OXYCODONE AND ACETAMINOPHEN 5; 325 MG/1; MG/1
1 TABLET ORAL EVERY 8 HOURS PRN
Qty: 21 TABLET | Refills: 0 | Status: SHIPPED | OUTPATIENT
Start: 2020-01-22 | End: 2022-03-31 | Stop reason: ALTCHOICE

## 2020-01-22 RX ORDER — VALSARTAN 320 MG/1
320 TABLET ORAL DAILY
Qty: 90 TABLET | Refills: 3 | Status: SHIPPED | OUTPATIENT
Start: 2020-01-22 | End: 2020-09-16 | Stop reason: SDUPTHER

## 2020-01-22 RX ORDER — CYCLOBENZAPRINE HCL 10 MG
10 TABLET ORAL 3 TIMES DAILY PRN
Qty: 30 TABLET | Refills: 1 | Status: SHIPPED | OUTPATIENT
Start: 2020-01-22 | End: 2020-02-01

## 2020-01-22 RX ADMIN — KETOROLAC TROMETHAMINE 60 MG: 30 INJECTION, SOLUTION INTRAMUSCULAR; INTRAVENOUS at 04:01

## 2020-01-22 NOTE — PROGRESS NOTES
Two patient identifiers verified.  Allergies reviewed.  Toradol 60mg IM administered to RUOQ per MD order.  Patient tolerated injection well; no redness, bleeding, or bruising noted to injection site.  Patient instructed to remain in clinic setting for 15 minutes.  Verbalizes understanding.

## 2020-01-22 NOTE — PATIENT INSTRUCTIONS
Understanding Meniscal Tears    The meniscus is a tough cushion of fibrous tissue called cartilage in the knee joint. It cushions the knee. It absorbs shock and helps spread weight across the knee joint. It also works with other parts of the knee to help keep the joint stable. Injury or aging can cause the meniscus to tear and lead to pain and problems using the knee.   What causes meniscal tears?  A sudden injury can tear the meniscus. This is often because of planting the foot and twisting the knee. Sports such as soccer, football, and basketball are often involved. Repeated actions, such as squatting, may also lead to a tear. Breakdown of the meniscus because of aging can also lead to tears.  Symptoms of meniscal tears  These can include:  · Knee pain  · Knee swelling  · Catching of the knee or inability to straighten the knee  · Unstable feeling in the knee  Treatment for meniscal tears  A tear is unlikely to heal on its own. You often will need surgery to repair a tear. In many cases, your healthcare provider will first try treatments to help relieve symptoms. These may include:  · Rest the knee. This means avoiding any activity that puts stress on the knee joint. These include kneeling, squatting, jogging, and climbing stairs. In some cases, you may need to use crutches for a time to keep body weight off of the knee joint.  · Cold pack. Putting a cold pack on the knee helps reduce pain and swelling.  · Knee brace. Bracing the knee helps support it.  · Medicine. Prescription and over-the-counter pain medicines can help relieve swelling and pain.  · Exercises. Exercises help strengthen the muscles of the leg to help support the knee joint.  If these treatments dont help relieve symptoms or the injury is severe, you may need surgery. This can repair the meniscus to relieve symptoms and restore movement.     When to call your healthcare provider  Call your healthcare provider right away if you have any of  these:  · Fever of 100.4°F (38°C) or higher, or as directed  · Pain or swelling that gets worse, including pain in the calf  · Numbness or tingling in leg or foot  · You suddenly cant put any weight on your leg  · Your knee locks   Date Last Reviewed: 3/10/2016  © 3972-2768 [employer]. [address] [place] [address]. All rights reserved. This information is not intended as a substitute for professional medical care. Always follow your healthcare professional's instructions.

## 2020-01-23 ENCOUNTER — OFFICE VISIT (OUTPATIENT)
Dept: ORTHOPEDICS | Facility: CLINIC | Age: 60
End: 2020-01-23
Payer: COMMERCIAL

## 2020-01-23 ENCOUNTER — PATIENT OUTREACH (OUTPATIENT)
Dept: ADMINISTRATIVE | Facility: OTHER | Age: 60
End: 2020-01-23

## 2020-01-23 VITALS
OXYGEN SATURATION: 98 % | RESPIRATION RATE: 18 BRPM | BODY MASS INDEX: 41.75 KG/M2 | HEIGHT: 73 IN | SYSTOLIC BLOOD PRESSURE: 160 MMHG | HEART RATE: 50 BPM | DIASTOLIC BLOOD PRESSURE: 90 MMHG | WEIGHT: 315 LBS

## 2020-01-23 DIAGNOSIS — M25.561 CHRONIC PAIN OF RIGHT KNEE: Primary | ICD-10-CM

## 2020-01-23 DIAGNOSIS — G89.29 CHRONIC PAIN OF RIGHT KNEE: Primary | ICD-10-CM

## 2020-01-23 DIAGNOSIS — M17.11 PRIMARY OSTEOARTHRITIS OF RIGHT KNEE: Primary | ICD-10-CM

## 2020-01-23 DIAGNOSIS — M25.361 KNEE BUCKLING, RIGHT: ICD-10-CM

## 2020-01-23 PROCEDURE — 3080F DIAST BP >= 90 MM HG: CPT | Mod: CPTII,S$GLB,, | Performed by: PHYSICIAN ASSISTANT

## 2020-01-23 PROCEDURE — 20610 LARGE JOINT ASPIRATION/INJECTION: R KNEE: ICD-10-PCS | Mod: RT,S$GLB,, | Performed by: PHYSICIAN ASSISTANT

## 2020-01-23 PROCEDURE — 99999 PR PBB SHADOW E&M-EST. PATIENT-LVL IV: CPT | Mod: PBBFAC,,, | Performed by: PHYSICIAN ASSISTANT

## 2020-01-23 PROCEDURE — 3008F BODY MASS INDEX DOCD: CPT | Mod: CPTII,S$GLB,, | Performed by: PHYSICIAN ASSISTANT

## 2020-01-23 PROCEDURE — 3077F PR MOST RECENT SYSTOLIC BLOOD PRESSURE >= 140 MM HG: ICD-10-PCS | Mod: CPTII,S$GLB,, | Performed by: PHYSICIAN ASSISTANT

## 2020-01-23 PROCEDURE — 99999 PR PBB SHADOW E&M-EST. PATIENT-LVL IV: ICD-10-PCS | Mod: PBBFAC,,, | Performed by: PHYSICIAN ASSISTANT

## 2020-01-23 PROCEDURE — 99203 PR OFFICE/OUTPT VISIT, NEW, LEVL III, 30-44 MIN: ICD-10-PCS | Mod: 25,S$GLB,, | Performed by: PHYSICIAN ASSISTANT

## 2020-01-23 PROCEDURE — 3080F PR MOST RECENT DIASTOLIC BLOOD PRESSURE >= 90 MM HG: ICD-10-PCS | Mod: CPTII,S$GLB,, | Performed by: PHYSICIAN ASSISTANT

## 2020-01-23 PROCEDURE — 99203 OFFICE O/P NEW LOW 30 MIN: CPT | Mod: 25,S$GLB,, | Performed by: PHYSICIAN ASSISTANT

## 2020-01-23 PROCEDURE — 3077F SYST BP >= 140 MM HG: CPT | Mod: CPTII,S$GLB,, | Performed by: PHYSICIAN ASSISTANT

## 2020-01-23 PROCEDURE — 3008F PR BODY MASS INDEX (BMI) DOCUMENTED: ICD-10-PCS | Mod: CPTII,S$GLB,, | Performed by: PHYSICIAN ASSISTANT

## 2020-01-23 PROCEDURE — 20610 DRAIN/INJ JOINT/BURSA W/O US: CPT | Mod: RT,S$GLB,, | Performed by: PHYSICIAN ASSISTANT

## 2020-01-23 RX ORDER — TRIAMCINOLONE ACETONIDE 40 MG/ML
40 INJECTION, SUSPENSION INTRA-ARTICULAR; INTRAMUSCULAR
Status: DISCONTINUED | OUTPATIENT
Start: 2020-01-23 | End: 2020-01-23 | Stop reason: HOSPADM

## 2020-01-23 RX ADMIN — TRIAMCINOLONE ACETONIDE 40 MG: 40 INJECTION, SUSPENSION INTRA-ARTICULAR; INTRAMUSCULAR at 11:01

## 2020-01-23 NOTE — LETTER
January 23, 2020      Carmen Cosby MD  [address] [facility] [address]           [facility]  [address]  [facility] [place] [address]  Phone: [phone]          Patient: Reagan Carbajal   MR Number: [id number]   YOB: 1960   Date of Visit: 1/23/2020       Dear Dr. Carmen Cosby:    Thank you for referring Reagan Carbajal to me for evaluation. Attached you will find relevant portions of my assessment and plan of care.    If you have questions, please do not hesitate to call me. I look forward to following Reagan Carbajal along with you.    Sincerely,    Mikki Corral PA-C    Enclosure  CC:  No Recipients    If you would like to receive this communication electronically, please contact [email or web address] or [phone] to request more information on [employer] Link access.    For providers and/or their staff who would like to refer a patient to [facility], please contact us through our one-stop-shop provider referral line, [facility], at [phone].    If you feel you have received this communication in error or would no longer like to receive these types of communications, please e-mail [email or web address]

## 2020-01-23 NOTE — PROGRESS NOTES
Subjective:       Patient ID: Reagan Carbajal is a 59 y.o. male.    Chief Complaint: Leg Pain (right)   Patient complaining of right leg pain radiating down from the back  Patient denies any actual injury causing the pain  However patient has had some buckling of the right knee on occasion  HPI see above  Review of Systems   Constitutional: Positive for unexpected weight change.   HENT: Negative.    Eyes: Negative.    Respiratory: Negative.    Cardiovascular: Negative.    Endocrine: Negative.    Genitourinary: Negative.    Musculoskeletal: Positive for arthralgias, back pain, gait problem and joint swelling.   Skin: Negative.    Allergic/Immunologic: Negative.    Hematological: Negative.    Psychiatric/Behavioral: Negative.        Objective:      Physical Exam   Constitutional: He is oriented to person, place, and time. He appears well-developed. He appears distressed.   HENT:   Head: Normocephalic and atraumatic.   Right Ear: External ear normal.   Left Ear: External ear normal.   Nose: Nose normal.   Mouth/Throat: Oropharynx is clear and moist.   Eyes: Pupils are equal, round, and reactive to light. Conjunctivae and EOM are normal.   Neck: Normal range of motion. Neck supple. No JVD present. No thyromegaly present.   Cardiovascular: Normal rate, regular rhythm, normal heart sounds and intact distal pulses.   No murmur heard.  Pulmonary/Chest: Effort normal and breath sounds normal. No respiratory distress.   Abdominal: Soft. Bowel sounds are normal. He exhibits no distension.   Musculoskeletal:        Right knee: He exhibits decreased range of motion, swelling, effusion and deformity. Tenderness found.        Left knee: He exhibits decreased range of motion and deformity.        Lumbar back: He exhibits decreased range of motion, tenderness, pain and spasm.        Right upper leg: He exhibits tenderness.        Right lower leg: He exhibits tenderness.   Positive straight leg raise on the right  Negative straight leg  raise on the left   Neurological: He is alert and oriented to person, place, and time. He displays normal reflexes. No cranial nerve deficit or sensory deficit. He exhibits normal muscle tone. Coordination normal.   Skin: Skin is warm and dry. No rash noted. No erythema. No pallor.   Psychiatric: He has a normal mood and affect. His behavior is normal. Judgment and thought content normal.       Assessment:       1. Leg pain, central, right    2. Knee buckling, right    3. Hypertension, unspecified type    4. Sciatica, unspecified laterality        Plan:     Reagan was seen today for leg pain.    Diagnoses and all orders for this visit:    Leg pain, central, right  -     X-Ray Pelvis Routine AP; Future  -     X-Ray Lumbar Spine AP And Lateral; Future  -     cyclobenzaprine (FLEXERIL) 10 MG tablet; Take 1 tablet (10 mg total) by mouth 3 (three) times daily as needed.  -     ketorolac injection 60 mg    Knee buckling, right  -     X-Ray Knee 1 or 2 View Right; Future  -     MRI Knee W WO Contrast Right; Future  -     Ambulatory referral/consult to Orthopedics; Future  -     ketorolac injection 60 mg    Hypertension, unspecified type  -     valsartan (DIOVAN) 320 MG tablet; Take 1 tablet (320 mg total) by mouth once daily.    Sciatica, unspecified laterality  -     Discontinue: methylPREDNISolone (MEDROL DOSEPACK) 4 mg tablet; use as directed  -     diclofenac (VOLTAREN) 50 MG EC tablet; Take 1 tablet (50 mg total) by mouth 2 (two) times daily.  -     oxyCODONE-acetaminophen (PERCOCET) 5-325 mg per tablet; Take 1 tablet by mouth every 8 (eight) hours as needed for Pain.  -     ketorolac injection 60 mg         will contact patient with results of testing when available patient will make an   appointment with Orthopedics follow-up follow-up recheck blood pressure reading in 1 month after making medication adjustments

## 2020-01-23 NOTE — PROGRESS NOTES
SUBJECTIVE:     Chief Complaint   Patient presents with    Right Knee - Pain       History of Present Illness:  Reagan Carbajal is a 59 y.o. year old male here with a history of constant right knee pain which started 5 days ago.  He does report intermittent symptoms of his knee buckling in the past.  There is not a history of trauma.  He describes pain that radiates from his buttock and into his posterior thigh.  The pain is described as sharp and achy, 10/10. Aggravating factors include activity.  Associated symptoms include knee giving out.  There is not numbness or tingling of the lower extremity.  There is back pain. He saw his primary physician yesterday and started taking diclofenac this morning.  He also has prescription for muscle relaxant and pain medication. There is not a history of previous injury or surgery to the knee.  The patient does not use an assistive device.    Review of patient's allergies indicates:   Allergen Reactions    Lisinopril Swelling    Tramadol      Nausea      Triamterene          Current Outpatient Medications   Medication Sig Dispense Refill    ALPRAZolam (XANAX) 1 MG tablet Take 1 tablet (1 mg total) by mouth nightly as needed. 30 tablet 5    aspirin (ECOTRIN) 81 MG EC tablet Take 81 mg by mouth once daily.      cyclobenzaprine (FLEXERIL) 10 MG tablet Take 1 tablet (10 mg total) by mouth 3 (three) times daily as needed. 30 tablet 1    diclofenac (VOLTAREN) 50 MG EC tablet Take 1 tablet (50 mg total) by mouth 2 (two) times daily. 40 tablet 1    EPINEPHrine 0.15 mg/0.15 mL AtIn Inject 0.3 mLs (0.3 mg total) into the muscle as needed (Administer into outer aspect of thigh. May repeat one time in 15 minutes if symptoms persist). 1 Device 0    famotidine (PEPCID) 20 MG tablet Take 1 tablet (20 mg total) by mouth 2 (two) times daily. 60 tablet 1    furosemide (LASIX) 40 MG tablet Take 1 tablet (40 mg total) by mouth daily as needed. 30 tablet 11    hyoscyamine  (ANASPAZ,LEVSIN) 0.125 mg Tab Take 1 tablet (125 mcg total) by mouth every 6 (six) hours as needed. 40 tablet 2    lovastatin (MEVACOR) 40 MG tablet TAKE ONE TABLET BY MOUTH NIGHTLY 90 tablet 1    mupirocin (BACTROBAN) 2 % ointment Apply topically 3 (three) times daily. 30 g 1    oxyCODONE-acetaminophen (PERCOCET) 5-325 mg per tablet Take 1 tablet by mouth every 8 (eight) hours as needed for Pain. 21 tablet 0    valsartan (DIOVAN) 320 MG tablet Take 1 tablet (320 mg total) by mouth once daily. 90 tablet 3    cetirizine (ZYRTEC) 10 MG tablet Take 1 tablet (10 mg total) by mouth 2 (two) times daily. for 7 days 30 tablet 0     No current facility-administered medications for this visit.        Past Medical History:   Diagnosis Date    Anxiety     Colon polyp     Colon polyp     CPAP (continuous positive airway pressure) dependence     does not use    GERD (gastroesophageal reflux disease)     Gout 9/13/2018    HTN (hypertension) 11/12/2012    Hyperlipidemia     Insomnia with sleep apnea     Obesity, morbid, BMI 40.0-49.9 6/12/2014       Past Surgical History:   Procedure Laterality Date    COLONOSCOPY N/A 5/3/2016    Procedure: COLONOSCOPY;  Surgeon: Chance Glynn MD;  Location: [address]);  Service: Endoscopy;  Laterality: N/A;    COLONOSCOPY W/ POLYPECTOMY         Vital Signs (Most Recent)  Vitals:    01/23/20 1054   BP: (!) 160/90   Pulse: (!) 50   Resp: 18           Review of Systems:  ROS:  Constitutional: no fever or chills  Eyes: no visual changes  ENT: no nasal congestion or sore throat  Respiratory: no cough or shortness of breath  Cardiovascular: no chest pain or palpitations  Gastrointestinal: no nausea or vomiting, tolerating diet  Genitourinary: no hematuria or dysuria  Integument/Breast: no rash or pruritis  Hematologic/Lymphatic: no easy bruising or lymphadenopathy  Musculoskeletal: no arthralgias or myalgias  Neurological: no seizures or tremors  Behavioral/Psych: no auditory or  "visual hallucinations  Endocrine: no heat or cold intolerance                OBJECTIVE:     PHYSICAL EXAM:  Height: 6' 1" (185.4 cm) Weight: (!) 154.5 kg (340 lb 9.8 oz)   General: Well developed, well nourished, in no acute distress.  Neurological: Mood & affect are normal.  HEENT: NCAT, sclera nonicteric   Lungs: Respirations are equal and unlabored.   CV: 2+ bilateral upper and lower extremity pulses.   Skin: Intact throughout with no rashes, erythema, or lesions  Extremities: No LE edema, no erythema or warmth of the skin in either lower extremity.    Right Lower extremity Exam:  Knee Range of Motion: 0-120   Effusion: none  Condition of skin: intact  Location of tenderness: popliteal fossa   There is mild TTP right lower lumbar and buttock  Strength:5 of 5 quadriceps strength and 5 of 5 hamstring strength  Stability:  Lachman: stable and PCL: stable  Varus /Valgus stress:  Normal  Sensation to light touch intact L2-S1      Hip Examination:  full painless range of motion, without tenderness    IMAGING:    X-rays of the right knee, personally reviewed by me, demonstrate moderate-severe DJD with joint space narrowing and osteophyte formation.  No fracture or dislocation.    ASSESSMENT/PLAN:   59 y.o. year old male with right knee osteoarthritis, lumbar radiculopathy    Plan: We discussed with the patient at length all the different treatment options available for the knee  - We discussed options for knee osteoarthritis such as activity modification, rest, nsaids prn pain and occasional steroid injections.    - He was given right knee CSI today, see procedure note.    - I think he is also having symptoms of lumbar radiculopathy.  He should continue rest, muscle relaxants, and nsaids.  If symptoms do not improve I would recommend further evaluation of the lumbar spine.    "

## 2020-01-23 NOTE — PROCEDURES
Large Joint Aspiration/Injection: R knee  Date/Time: 1/23/2020 11:00 AM  Performed by: Mikki Corral PA-C  Authorized by: Mikki Corral PA-C     Consent Done?:  Yes (Verbal)  Indications:  Pain  Timeout: Prior to procedure the correct patient, procedure, and site was verified    Anesthesia    Anesthetic: topical anesthetic    Location:  Knee  Site:  R knee  Prep: Patient was prepped and draped in usual sterile fashion    Needle size:  22 G  Approach:  Anterolateral  Medications:  40 mg triamcinolone acetonide 40 mg/mL  Patient tolerance:  Patient tolerated the procedure well with no immediate complications

## 2020-01-28 DIAGNOSIS — F51.02 TRANSIENT INSOMNIA: ICD-10-CM

## 2020-01-29 RX ORDER — ALPRAZOLAM 1 MG/1
TABLET ORAL
Refills: 0 | OUTPATIENT
Start: 2020-01-29

## 2020-01-29 NOTE — TELEPHONE ENCOUNTER
Spoke with pt and advised per provider;Pt was given a new rx 12/19/19 30 with 5 refills. Pt verbalized understanding.

## 2020-02-26 ENCOUNTER — TELEPHONE (OUTPATIENT)
Dept: PRIMARY CARE CLINIC | Facility: CLINIC | Age: 60
End: 2020-02-26

## 2020-02-26 DIAGNOSIS — I10 HYPERTENSION, UNSPECIFIED TYPE: Primary | ICD-10-CM

## 2020-02-26 RX ORDER — VERAPAMIL HYDROCHLORIDE 240 MG/1
240 CAPSULE, EXTENDED RELEASE ORAL DAILY
Qty: 30 CAPSULE | Refills: 11 | Status: SHIPPED | OUTPATIENT
Start: 2020-02-26 | End: 2020-09-16

## 2020-02-26 NOTE — TELEPHONE ENCOUNTER
Please see message and advise      Spoke with pt and was advised that his ears have been ringing ever since he has started the  Medication, and that the medication is not helping his pressure. Pt wants to know if he can go back to his old medication he was taking  or if you want to send him something else to start taking. Pt states he is also out of pressure medication right now.     ----- Message from Lily Fitzgerald sent at 2/26/2020 11:12 AM CST -----  Contact: Self   Pt is requesting a call back regarding rx, ( valsartan (DIOVAN) 320 MG tablet ) pt states ears are ringing due to medication. Please call and advise.

## 2020-03-02 ENCOUNTER — PATIENT OUTREACH (OUTPATIENT)
Dept: ADMINISTRATIVE | Facility: OTHER | Age: 60
End: 2020-03-02

## 2020-03-04 ENCOUNTER — CLINICAL SUPPORT (OUTPATIENT)
Dept: AUDIOLOGY | Facility: CLINIC | Age: 60
End: 2020-03-04
Payer: COMMERCIAL

## 2020-03-04 ENCOUNTER — OFFICE VISIT (OUTPATIENT)
Dept: OTOLARYNGOLOGY | Facility: CLINIC | Age: 60
End: 2020-03-04
Payer: COMMERCIAL

## 2020-03-04 VITALS
BODY MASS INDEX: 41.75 KG/M2 | DIASTOLIC BLOOD PRESSURE: 89 MMHG | HEIGHT: 73 IN | WEIGHT: 315 LBS | SYSTOLIC BLOOD PRESSURE: 155 MMHG | HEART RATE: 78 BPM

## 2020-03-04 DIAGNOSIS — H93.19 TINNITUS, UNSPECIFIED LATERALITY: ICD-10-CM

## 2020-03-04 DIAGNOSIS — K13.79 UVULAR SWELLING: ICD-10-CM

## 2020-03-04 DIAGNOSIS — H93.13 BILATERAL TINNITUS: Primary | ICD-10-CM

## 2020-03-04 DIAGNOSIS — H93.19 TINNITUS, UNSPECIFIED LATERALITY: Primary | ICD-10-CM

## 2020-03-04 PROCEDURE — 92557 PR COMPREHENSIVE HEARING TEST: ICD-10-PCS | Mod: S$GLB,,, | Performed by: AUDIOLOGIST

## 2020-03-04 PROCEDURE — 99999 PR PBB SHADOW E&M-EST. PATIENT-LVL III: ICD-10-PCS | Mod: PBBFAC,,, | Performed by: OTOLARYNGOLOGY

## 2020-03-04 PROCEDURE — 92567 TYMPANOMETRY: CPT | Mod: S$GLB,,, | Performed by: AUDIOLOGIST

## 2020-03-04 PROCEDURE — 99244 PR OFFICE CONSULTATION,LEVEL IV: ICD-10-PCS | Mod: S$GLB,,, | Performed by: OTOLARYNGOLOGY

## 2020-03-04 PROCEDURE — 92557 COMPREHENSIVE HEARING TEST: CPT | Mod: S$GLB,,, | Performed by: AUDIOLOGIST

## 2020-03-04 PROCEDURE — 99999 PR PBB SHADOW E&M-EST. PATIENT-LVL III: CPT | Mod: PBBFAC,,, | Performed by: OTOLARYNGOLOGY

## 2020-03-04 PROCEDURE — 99999 PR PBB SHADOW E&M-EST. PATIENT-LVL II: ICD-10-PCS | Mod: PBBFAC,,,

## 2020-03-04 PROCEDURE — 99244 OFF/OP CNSLTJ NEW/EST MOD 40: CPT | Mod: S$GLB,,, | Performed by: OTOLARYNGOLOGY

## 2020-03-04 PROCEDURE — 92567 PR TYMPA2METRY: ICD-10-PCS | Mod: S$GLB,,, | Performed by: AUDIOLOGIST

## 2020-03-04 PROCEDURE — 99999 PR PBB SHADOW E&M-EST. PATIENT-LVL II: CPT | Mod: PBBFAC,,,

## 2020-03-04 RX ORDER — ACETIC ACID 20.65 MG/ML
4 SOLUTION AURICULAR (OTIC) 3 TIMES DAILY
Qty: 15 ML | Refills: 0 | Status: SHIPPED | OUTPATIENT
Start: 2020-03-04 | End: 2020-03-11

## 2020-03-04 NOTE — Clinical Note
March 4, 2020      Ermias Quintero Jr., MD  [address]  [address]  [facility] [address]           [facility] Otorhinol[facility]  [address]  [facility] [address]  Phone: [phone]  Fax: [phone]          Patient: Reagan Carbajal   MR Number: [id number]   YOB: 1960   Date of Visit: 3/4/2020       Dear Dr. Ermias Quintero Jr.:    Thank you for referring Reagan Carbajal to me for evaluation. Attached you will find relevant portions of my assessment and plan of care.    If you have questions, please do not hesitate to call me. I look forward to following Reagan Carbajal along with you.    Sincerely,    Benjamin Stringer MD    Enclosure  CC:  No Recipients    If you would like to receive this communication electronically, please contact [email or web address] or [phone] to request more information on [employer] Link access.    For providers and/or their staff who would like to refer a patient to [facility], please contact us through our one-stop-shop provider referral line, [facility], at [phone].    If you feel you have received this communication in error or would no longer like to receive these types of communications, please e-mail [email or web address]

## 2020-03-04 NOTE — PROGRESS NOTES
Reagan Carbajal was seen today in the clinic for an audiologic evaluation.  Patients main complaint was constant ringing in both ears but worse in the left.  Mr. Carbajal reported that he has noticed constant ringing in his ears and noticed that it could possibly be a side effect of his medications. Mr. Carbajal reported that he used to take aspirin everyday but has since stopped taking it. Mr. Carbajal was urged to consult with his doctor first prior to stopping any mediciations. Mr. Carbajal denied any difficulty hearing or dizziness at this time.    Tympanometry revealed Type A, bilaterally.  Audiogram results revealed normal hearing (250-8000 Hz), bilaterally. Speech reception thresholds were noted at 10 dB in the right ear and 10 dB in the left ear.  Speech discrimination scores were 96% in the right ear and 100% in the left ear.    Recommendations:  1. Otologic evaluation  2. Annual audiogram  3. Noise protection when in noise

## 2020-03-04 NOTE — LETTER
2020             OTOLARYNGOLOGY AND COMMUNICATION SCIENCES    Michael Sim MD, FACS          SURGICAL AND MEDICAL DISEASES OF HEAD AND NECK  MD Michael Love MD, FACS  Malcolm Mclaughlin III, MD    OTOLOGY, NEUROTOLOGY and SKULL-BASE SURGERY  Fredy Brizuela MD, FACS  Torey Martinez MD, Director    PEDIATRIC OTOLARYNGOLOGY & OTOLOGY  CLARISSE Adhikari MD, FAAP  Sunshine Rizo MD, FACS    FACIAL PLASTIC and RECONSTRUCTIVE SURGERY  JEAN PAUL Mujica III, MD, FACS    RHINOLOGY and SINUS SURGERY  Benjamin Stringer MD, MPH, FACS  Director   JEAN PAUL Mujica III, MD, FACS    LARYNGOLOGY  Blade Paniagua MD    SPEECH-LANGUAGE PATHOLOGY  Ashleigh Mares, PhD, [facility]-SLP  Jojo Loja, MS, CCC-SLP  Saira Deleon, MS, CCC-SLP  Marlene Chapman MA, [facility]-SLP    AUDIOLOGY SECTION  Pura Hooks, MS, CCC-A  SAHIL Menendez, CCC-A  Leslie Segovia, Vimal, CCC-A  Vimal Valles, CCC-A  Ermias Alexander Jr., SAHIL, CCA-A  SAHIL Warren, CCC-A  Vimal Kang, CCC-A    [facility]  Head and Neck Surgical Oncology  CHELE Garvin, FNP-C  Pedatric Otolaryngology  CHELE England, PNP-C       Re:  Reagan Carbajal  :  1960    Dear Dr. Quintero,    Thank you for referring your patient, Reagan Carbajal, to us for evaluation and treatment.  I have enclosed a copy of my clinic note for your review and records.  If you have any questions please do not hesitate to contact our office.     Thank you for allowing me to participate in the care of your patient.    Sincerely,        Benjamin Stringer MD, MPH, FACS    Director, Rhinology and Sinus Surgery  Department of Otorhinolaryngology  [facility]    Encl:  Progress note       [facility] [address] [place] [address]  phone [phone]  fax [phone]  [email or web address]

## 2020-03-04 NOTE — PROGRESS NOTES
Subjective:      Reagan Carbajal is a 59 y.o. male who was referred to me by Dr. Cosby in consultation for tinnitus.    He was initially referred after an episode of acute uvular swelling that occurred overnight in early January.  He was evaluated in the ED and this seemed to resolve without dyspnea or sequellae.  On further workup he was found to have an elevated C1 esterase inhibitor level, and there is apparently a familial history of similar uvula swelling.  He was treated at that time with prednisone, and afterward became aware of ringing in the ears, which is the main reason he presents today.  He describes, bilateral, mild-to-moderate, nonpulsatile, high-pitched tinnitus, which is worse in quiet areas.  He denies hearing loss, aural fullness, otalgia, otorrhea or vertigo.  There is not a family history of hearing loss at a young age.  There is not a prior history of ear surgery.  There is not a prior history of ear infections .  He denies a history of significant noise exposure.  He does not wear hearing aids currently.  He has not had a hearing test recently.       Past Medical History  He has a past medical history of Anxiety, Colon polyp, Colon polyp, CPAP (continuous positive airway pressure) dependence, GERD (gastroesophageal reflux disease), Gout, HTN (hypertension), Hyperlipidemia, Insomnia with sleep apnea, and Obesity, morbid, BMI 40.0-49.9.    Past Surgical History  He has a past surgical history that includes Colonoscopy w/ polypectomy and Colonoscopy (N/A, 5/3/2016).    Family History  His family history includes Heart disease in his mother; Hepatitis in his brother; Hyperlipidemia in his mother; Hypertension in his father and mother; Lymphoma in his mother.    Social History  He reports that he quit smoking about 26 years ago. His smoking use included cigarettes and cigars. He has never used smokeless tobacco. He reports that he does not drink alcohol or use drugs.    Allergies  He is  "allergic to diovan [valsartan]; lisinopril; tramadol; and triamterene.    Medications  He has a current medication list which includes the following prescription(s): alprazolam, aspirin, diclofenac, epinephrine, famotidine, furosemide, lovastatin, mupirocin, valsartan, verapamil, cetirizine, hyoscyamine, and oxycodone-acetaminophen.    Review of Systems   Constitutional: Negative for fatigue, fever and unexpected weight change.   HENT: Positive for tinnitus. Negative for congestion, dental problem, ear discharge, ear pain, facial swelling, hearing loss, hoarse voice, nosebleeds, postnasal drip, rhinorrhea, sinus pressure, sore throat, trouble swallowing and voice change.    Eyes: Negative for photophobia, discharge, itching and visual disturbance.   Respiratory: Negative for apnea, cough, shortness of breath and wheezing.    Cardiovascular: Negative for chest pain and palpitations.   Gastrointestinal: Negative for abdominal pain, nausea and vomiting.   Endocrine: Negative for cold intolerance and heat intolerance.   Genitourinary: Negative for difficulty urinating.   Musculoskeletal: Negative for arthralgias, back pain, myalgias and neck pain.   Skin: Negative for rash.   Allergic/Immunologic: Negative for environmental allergies and food allergies.   Neurological: Negative for dizziness, seizures, syncope, weakness and headaches.   Hematological: Negative for adenopathy. Does not bruise/bleed easily.   Psychiatric/Behavioral: Negative for decreased concentration, dysphoric mood and sleep disturbance. The patient is not nervous/anxious.           Objective:     BP (!) 155/89   Pulse 78   Ht 6' 1" (1.854 m)   Wt (!) 150.8 kg (332 lb 7.3 oz)   BMI 43.86 kg/m²      Constitutional:   He appears well-developed. He is cooperative. Normal speech.  No hoarse voice.      Head:  Normocephalic. Salivary glands normal.  Facial strength is normal.      Ears:    Right Ear: No drainage or tenderness. Tympanic membrane is not " perforated. Tympanic membrane mobility is normal. No middle ear effusion. No decreased hearing is noted.   Left Ear: No drainage or tenderness. Tympanic membrane is not perforated. Tympanic membrane mobility is normal.  No middle ear effusion. No decreased hearing is noted.     Nose:  No mucosal edema, rhinorrhea, septal deviation or polyps. No epistaxis. Turbinates normal, no turbinate masses and no turbinate hypertrophy.  Right sinus exhibits no maxillary sinus tenderness and no frontal sinus tenderness. Left sinus exhibits no maxillary sinus tenderness and no frontal sinus tenderness.     Mouth/Throat  Oropharynx clear and moist without lesions or asymmetry and normal uvula midline. He does not have dentures. Normal dentition. No oral lesions or mucous membrane lesions. No oropharyngeal exudate or posterior oropharyngeal erythema. Tonsils present, +1.  Mirror exam not performed due to patient tolerance.  Mirror exam not performed due to patient tolerance.      Neck:  Neck normal without thyromegaly masses, asymmetry, normal tracheal structure, crepitus, and tenderness, thyroid normal, trachea normal and no adenopathy. Normal range of motion present.     He has no cervical adenopathy.     Cardiovascular:   Regular rhythm.      Pulmonary/Chest:   Effort normal.     Psychiatric:   He has a normal mood and affect. His speech is normal and behavior is normal.     Neurological:   No cranial nerve deficit.     Skin:   No rash noted.       Procedure    None        Data Reviewed    WBC (K/uL)   Date Value   12/18/2019 8.76     Platelets (K/uL)   Date Value   12/18/2019 223      Creatinine (mg/dL)   Date Value   12/18/2019 1.0     TSH (uIU/mL)   Date Value   07/09/2019 2.987     Glucose (mg/dL)   Date Value   12/18/2019 132 (H)     Hemoglobin A1C (%)   Date Value   05/23/2017 5.6       I independently reviewed the tracings of the complete audiometric evaluation performed today.  I reviewed the audiogram with the patient as  well.  Pertinent findings include a normal study.         Assessment:     1. Bilateral tinnitus    2. Uvular swelling         Plan:     I had a long discussion with the patient regarding his condition and the further workup and management options.  I reassured him as to the benign nature of today's findings, which may be consistent with medication-related tinnitus.  He has already taken the initiative to hold his aspirin starting a couple of days ago, and is also holding his furosemide.  I advised him to discuss these choices with his primary physician.  In addition, I prescribed the daily use of acetic acid drops to treat his condition.  The finding of abnormal levels of C1 esterase may be best addressed by a rheumatologist or other internist.  There is not a surgical indication at this time.    Follow up if symptoms worsen or fail to improve.

## 2020-03-05 ENCOUNTER — TELEPHONE (OUTPATIENT)
Dept: PRIMARY CARE CLINIC | Facility: CLINIC | Age: 60
End: 2020-03-05

## 2020-03-05 RX ORDER — VERAPAMIL HYDROCHLORIDE 360 MG/1
360 CAPSULE, DELAYED RELEASE PELLETS ORAL DAILY
Qty: 30 CAPSULE | Refills: 11 | Status: SHIPPED | OUTPATIENT
Start: 2020-03-05 | End: 2020-09-16 | Stop reason: SDUPTHER

## 2020-03-05 NOTE — TELEPHONE ENCOUNTER
Patient called regarding his blood pressure being high patient states he has slight headache and took his pressure while on the phone, at this time his pressure is 199/103 at this time patient did state that all blood pressure medication had been taken this a.m. Dr. Boogie over heard the blood pressure and instructed the patient to take a second Verelan at this time and too call back in one hour with his blood pressure reading if it has not gone down significantly he is to go to the emergency room patient verbalized understanding.

## 2020-03-05 NOTE — TELEPHONE ENCOUNTER
----- Message from Calista Steen sent at 3/5/2020 10:57 AM CST -----  Contact: Pt- [phone]  Type:  Needs Medical Advice    Who Called: Pt    Symptoms (please be specific): blood pressure 173/99    Would the patient rather a call back or a response via [facility]? Call    Best Call Back Number: [phone]    Additional Information: Pt called to speak with provider regarding his blood pressure. He is requesting a call back.

## 2020-03-05 NOTE — TELEPHONE ENCOUNTER
patient contacted to see what his pressure was after taking a extra dose of Verelan blood pressure is 155/72 patient states he only took 80 mg of Verelan patient states he has a lot of stressor on his job and family stress going on also. Patient advised to monitor his pressure and to call the office back if he needs to. Patient also advise that he did take a half of Xanax to help calm his nerves. Also advise patient Dr. Boogie did increase his Verelan to 360mg.

## 2020-07-15 DIAGNOSIS — F51.02 TRANSIENT INSOMNIA: ICD-10-CM

## 2020-07-15 DIAGNOSIS — Z00.00 ROUTINE GENERAL MEDICAL EXAMINATION AT A HEALTH CARE FACILITY: Primary | ICD-10-CM

## 2020-07-15 RX ORDER — ALPRAZOLAM 1 MG/1
TABLET ORAL
Qty: 30 TABLET | Refills: 0 | Status: SHIPPED | OUTPATIENT
Start: 2020-07-15 | End: 2020-09-16 | Stop reason: SDUPTHER

## 2020-09-02 ENCOUNTER — PATIENT OUTREACH (OUTPATIENT)
Dept: ADMINISTRATIVE | Facility: HOSPITAL | Age: 60
End: 2020-09-02

## 2020-09-09 ENCOUNTER — LAB VISIT (OUTPATIENT)
Dept: LAB | Facility: HOSPITAL | Age: 60
End: 2020-09-09
Attending: FAMILY MEDICINE
Payer: COMMERCIAL

## 2020-09-09 DIAGNOSIS — Z00.00 ROUTINE GENERAL MEDICAL EXAMINATION AT A HEALTH CARE FACILITY: ICD-10-CM

## 2020-09-09 LAB
ALBUMIN SERPL BCP-MCNC: 4.1 G/DL (ref 3.5–5.2)
ALP SERPL-CCNC: 76 U/L (ref 55–135)
ALT SERPL W/O P-5'-P-CCNC: 19 U/L (ref 10–44)
ANION GAP SERPL CALC-SCNC: 6 MMOL/L (ref 8–16)
AST SERPL-CCNC: 19 U/L (ref 10–40)
BASOPHILS # BLD AUTO: 0.03 K/UL (ref 0–0.2)
BASOPHILS NFR BLD: 0.4 % (ref 0–1.9)
BILIRUB SERPL-MCNC: 0.8 MG/DL (ref 0.1–1)
BUN SERPL-MCNC: 12 MG/DL (ref 8–23)
CALCIUM SERPL-MCNC: 9.4 MG/DL (ref 8.7–10.5)
CHLORIDE SERPL-SCNC: 107 MMOL/L (ref 95–110)
CHOLEST SERPL-MCNC: 207 MG/DL (ref 120–199)
CHOLEST/HDLC SERPL: 5 {RATIO} (ref 2–5)
CO2 SERPL-SCNC: 31 MMOL/L (ref 23–29)
CREAT SERPL-MCNC: 0.9 MG/DL (ref 0.5–1.4)
DIFFERENTIAL METHOD: ABNORMAL
EOSINOPHIL # BLD AUTO: 0.2 K/UL (ref 0–0.5)
EOSINOPHIL NFR BLD: 2.6 % (ref 0–8)
ERYTHROCYTE [DISTWIDTH] IN BLOOD BY AUTOMATED COUNT: 14.5 % (ref 11.5–14.5)
EST. GFR  (AFRICAN AMERICAN): >60 ML/MIN/1.73 M^2
EST. GFR  (NON AFRICAN AMERICAN): >60 ML/MIN/1.73 M^2
GLUCOSE SERPL-MCNC: 112 MG/DL (ref 70–110)
HCT VFR BLD AUTO: 47.2 % (ref 40–54)
HDLC SERPL-MCNC: 41 MG/DL (ref 40–75)
HDLC SERPL: 19.8 % (ref 20–50)
HGB BLD-MCNC: 14.7 G/DL (ref 14–18)
IMM GRANULOCYTES # BLD AUTO: 0.02 K/UL (ref 0–0.04)
IMM GRANULOCYTES NFR BLD AUTO: 0.3 % (ref 0–0.5)
LDLC SERPL CALC-MCNC: 131.8 MG/DL (ref 63–159)
LYMPHOCYTES # BLD AUTO: 2.1 K/UL (ref 1–4.8)
LYMPHOCYTES NFR BLD: 27.6 % (ref 18–48)
MCH RBC QN AUTO: 29.3 PG (ref 27–31)
MCHC RBC AUTO-ENTMCNC: 31.1 G/DL (ref 32–36)
MCV RBC AUTO: 94 FL (ref 82–98)
MONOCYTES # BLD AUTO: 0.8 K/UL (ref 0.3–1)
MONOCYTES NFR BLD: 10.4 % (ref 4–15)
NEUTROPHILS # BLD AUTO: 4.5 K/UL (ref 1.8–7.7)
NEUTROPHILS NFR BLD: 58.7 % (ref 38–73)
NONHDLC SERPL-MCNC: 166 MG/DL
NRBC BLD-RTO: 0 /100 WBC
PLATELET # BLD AUTO: 237 K/UL (ref 150–350)
PMV BLD AUTO: 11 FL (ref 9.2–12.9)
POTASSIUM SERPL-SCNC: 5.3 MMOL/L (ref 3.5–5.1)
PROT SERPL-MCNC: 7.4 G/DL (ref 6–8.4)
RBC # BLD AUTO: 5.01 M/UL (ref 4.6–6.2)
SODIUM SERPL-SCNC: 144 MMOL/L (ref 136–145)
TRIGL SERPL-MCNC: 171 MG/DL (ref 30–150)
TSH SERPL DL<=0.005 MIU/L-ACNC: 1.74 UIU/ML (ref 0.4–4)
WBC # BLD AUTO: 7.67 K/UL (ref 3.9–12.7)

## 2020-09-09 PROCEDURE — 80053 COMPREHEN METABOLIC PANEL: CPT

## 2020-09-09 PROCEDURE — 84443 ASSAY THYROID STIM HORMONE: CPT

## 2020-09-09 PROCEDURE — 80061 LIPID PANEL: CPT

## 2020-09-09 PROCEDURE — 85025 COMPLETE CBC W/AUTO DIFF WBC: CPT

## 2020-09-09 PROCEDURE — 83036 HEMOGLOBIN GLYCOSYLATED A1C: CPT

## 2020-09-09 PROCEDURE — 86703 HIV-1/HIV-2 1 RESULT ANTBDY: CPT

## 2020-09-09 PROCEDURE — 36415 COLL VENOUS BLD VENIPUNCTURE: CPT | Mod: PO

## 2020-09-10 LAB
ESTIMATED AVG GLUCOSE: 111 MG/DL (ref 68–131)
HBA1C MFR BLD HPLC: 5.5 % (ref 4–5.6)
HIV 1+2 AB+HIV1 P24 AG SERPL QL IA: NEGATIVE

## 2020-09-16 ENCOUNTER — OFFICE VISIT (OUTPATIENT)
Dept: PRIMARY CARE CLINIC | Facility: CLINIC | Age: 60
End: 2020-09-16
Payer: COMMERCIAL

## 2020-09-16 ENCOUNTER — LAB VISIT (OUTPATIENT)
Dept: LAB | Facility: HOSPITAL | Age: 60
End: 2020-09-16
Attending: FAMILY MEDICINE
Payer: COMMERCIAL

## 2020-09-16 VITALS
TEMPERATURE: 98 F | SYSTOLIC BLOOD PRESSURE: 139 MMHG | HEIGHT: 73 IN | RESPIRATION RATE: 18 BRPM | HEART RATE: 77 BPM | BODY MASS INDEX: 41.75 KG/M2 | DIASTOLIC BLOOD PRESSURE: 82 MMHG | WEIGHT: 315 LBS | OXYGEN SATURATION: 97 %

## 2020-09-16 DIAGNOSIS — I10 HYPERTENSION, UNSPECIFIED TYPE: ICD-10-CM

## 2020-09-16 DIAGNOSIS — E78.5 HYPERLIPIDEMIA, UNSPECIFIED HYPERLIPIDEMIA TYPE: ICD-10-CM

## 2020-09-16 DIAGNOSIS — Z12.5 SCREENING FOR PROSTATE CANCER: ICD-10-CM

## 2020-09-16 DIAGNOSIS — F51.02 TRANSIENT INSOMNIA: ICD-10-CM

## 2020-09-16 DIAGNOSIS — H60.90 OTITIS EXTERNA, UNSPECIFIED CHRONICITY, UNSPECIFIED LATERALITY, UNSPECIFIED TYPE: ICD-10-CM

## 2020-09-16 DIAGNOSIS — E87.5 HYPERKALEMIA: Primary | ICD-10-CM

## 2020-09-16 DIAGNOSIS — J30.2 SEASONAL ALLERGIES: ICD-10-CM

## 2020-09-16 DIAGNOSIS — E87.5 HYPERKALEMIA: ICD-10-CM

## 2020-09-16 LAB
COMPLEXED PSA SERPL-MCNC: 0.52 NG/ML (ref 0–4)
POTASSIUM SERPL-SCNC: 5 MMOL/L (ref 3.5–5.1)

## 2020-09-16 PROCEDURE — 3079F PR MOST RECENT DIASTOLIC BLOOD PRESSURE 80-89 MM HG: ICD-10-PCS | Mod: CPTII,S$GLB,, | Performed by: FAMILY MEDICINE

## 2020-09-16 PROCEDURE — 3075F PR MOST RECENT SYSTOLIC BLOOD PRESS GE 130-139MM HG: ICD-10-PCS | Mod: CPTII,S$GLB,, | Performed by: FAMILY MEDICINE

## 2020-09-16 PROCEDURE — 36415 COLL VENOUS BLD VENIPUNCTURE: CPT | Mod: PN

## 2020-09-16 PROCEDURE — 99396 PR PREVENTIVE VISIT,EST,40-64: ICD-10-PCS | Mod: S$GLB,,, | Performed by: FAMILY MEDICINE

## 2020-09-16 PROCEDURE — 99999 PR PBB SHADOW E&M-EST. PATIENT-LVL IV: ICD-10-PCS | Mod: PBBFAC,,, | Performed by: FAMILY MEDICINE

## 2020-09-16 PROCEDURE — 99396 PREV VISIT EST AGE 40-64: CPT | Mod: S$GLB,,, | Performed by: FAMILY MEDICINE

## 2020-09-16 PROCEDURE — 3008F BODY MASS INDEX DOCD: CPT | Mod: CPTII,S$GLB,, | Performed by: FAMILY MEDICINE

## 2020-09-16 PROCEDURE — 99999 PR PBB SHADOW E&M-EST. PATIENT-LVL IV: CPT | Mod: PBBFAC,,, | Performed by: FAMILY MEDICINE

## 2020-09-16 PROCEDURE — 3075F SYST BP GE 130 - 139MM HG: CPT | Mod: CPTII,S$GLB,, | Performed by: FAMILY MEDICINE

## 2020-09-16 PROCEDURE — 3079F DIAST BP 80-89 MM HG: CPT | Mod: CPTII,S$GLB,, | Performed by: FAMILY MEDICINE

## 2020-09-16 PROCEDURE — 3008F PR BODY MASS INDEX (BMI) DOCUMENTED: ICD-10-PCS | Mod: CPTII,S$GLB,, | Performed by: FAMILY MEDICINE

## 2020-09-16 PROCEDURE — 84153 ASSAY OF PSA TOTAL: CPT

## 2020-09-16 PROCEDURE — 84132 ASSAY OF SERUM POTASSIUM: CPT

## 2020-09-16 RX ORDER — CETIRIZINE HYDROCHLORIDE 10 MG/1
10 TABLET ORAL 2 TIMES DAILY
Qty: 90 TABLET | Refills: 3 | Status: SHIPPED | OUTPATIENT
Start: 2020-09-16 | End: 2021-08-11 | Stop reason: SDUPTHER

## 2020-09-16 RX ORDER — VERAPAMIL HYDROCHLORIDE 360 MG/1
360 CAPSULE, DELAYED RELEASE PELLETS ORAL DAILY
Qty: 90 CAPSULE | Refills: 3 | Status: SHIPPED | OUTPATIENT
Start: 2020-09-16 | End: 2021-08-11 | Stop reason: SDUPTHER

## 2020-09-16 RX ORDER — OFLOXACIN 3 MG/ML
5 SOLUTION AURICULAR (OTIC) 2 TIMES DAILY
Qty: 5 ML | Refills: 1 | Status: SHIPPED | OUTPATIENT
Start: 2020-09-16 | End: 2022-11-28

## 2020-09-16 RX ORDER — LOVASTATIN 40 MG/1
TABLET ORAL
Qty: 90 TABLET | Refills: 3 | Status: SHIPPED | OUTPATIENT
Start: 2020-09-16 | End: 2021-08-11 | Stop reason: SDUPTHER

## 2020-09-16 RX ORDER — ALPRAZOLAM 1 MG/1
1 TABLET ORAL NIGHTLY PRN
Qty: 30 TABLET | Refills: 5 | Status: SHIPPED | OUTPATIENT
Start: 2020-09-16 | End: 2021-04-07

## 2020-09-16 RX ORDER — VALSARTAN 320 MG/1
320 TABLET ORAL DAILY
Qty: 90 TABLET | Refills: 3 | Status: SHIPPED | OUTPATIENT
Start: 2020-09-16 | End: 2021-02-02

## 2020-09-16 NOTE — PROGRESS NOTES
Reagan Carbajal is a 60 y.o. male     Chief Complaint:  Physical Exam  Source of history: Patient  Past Medical History:   Diagnosis Date    Anxiety     Colon polyp     Colon polyp     CPAP (continuous positive airway pressure) dependence     does not use    GERD (gastroesophageal reflux disease)     Gout 9/13/2018    HTN (hypertension) 11/12/2012    Hyperlipidemia     Insomnia with sleep apnea     Obesity, morbid, BMI 40.0-49.9 6/12/2014     Patient  reports that he quit smoking about 27 years ago. His smoking use included cigarettes and cigars. He has never used smokeless tobacco. He reports that he does not drink alcohol or use drugs.  Family History   Problem Relation Age of Onset    Heart disease Mother     Hyperlipidemia Mother     Hypertension Mother     Lymphoma Mother     Hypertension Father     Hepatitis Brother      ROS:                                                                                GENERAL: No fever, chills, fatigability or weight loss. .  SKIN: No rashes, itching or changes in color or texture of skin.  HEAD: No headaches or recent head trauma.  EYES: Visual acuity fine. No photophobia, ocular pain or diplopia.  EARS: Denies ear pain, discharge or vertigo.  NOSE: No loss of smell, no epistaxis or postnasal drip.  MOUTH & THROAT: No hoarseness or change in voice. No excessive gum bleeding.  NODES: Denies swollen glands.  CHEST: Denies STAPLETON, cyanosis, wheezing, cough and sputum production.  CARDIOVASCULAR: Denies chest pain, PND, orthopnea or reduced exercise tolerance.  ABDOMEN: Appetite fine. No weight loss. Denies diarrhea, abdominal pain, hematemesis or blood in stool.  URINARY: No flank pain, dysuria or hematuria. Frequency of urination at nighttime.  PERIPHERAL VASCULAR: No claudication or cyanosis.  MUSCULOSKELETAL: bilateral foot pain , low back pain  NEUROLOGIC: No history of seizures, paralysis, alteration of gait or coordination.    OBJECTIVE:  APPEARANCE: Well  nourished, well developed, in no acute distress.   VS:  see nurses notes 9/16/2020   SKIN: No lesions, good turgor, no rashes.    HEENT: TMs clear bilaterally, ear canals clear bilaterally,   nasal mucosa clear bilaterally, sinuses nontender to percussion, throat mild postnasal drip.  HEAD: Normocephalic, nontender to palpation.  NECK: Supple nontender, no carotid bruits, no thyromegaly.  NODES: Normal.  CHEST: Clear bilaterally, with good respiratory excursion, no evidence of respiratory distress.  CARDIOVASCULAR: S1, S2, regular rate and rhythm without murmur.  BREASTS: No masses palpated in either breast area, or axillary area, symmetry is noted.  ABDOMEN: Soft nontender no hepatosplenomegaly, no guarding or rebound, no pulsatile mass.  GENITALIA: , testicles are normal consistency and size, no masses palpated, circumcised male, no lesions noted., no hernias noted  RECTAL: Prostate not enlarged, no masses, no rectal masses noted, Hemoccult negative.  PERIPHERAL VASCULAR: Distal pulses palpable throughout, normal capillary refill in all distal extremities, no edema.  MUSCULOSKELETAL: No abnormalities noted.  BACK: No scoliosis, no spasm, cervical, thoracic, lumbar spine nontender to palpation  NEUROLOGIC: Cranial nerves II through XII grossly intact, motor exam 5 out of 5 on distal extremities,   no gait disturbances, sensation intact in all distal extremities  MENTAL STATUS: Patient oriented x3, normal affect, normal mood    ASSESSMENT/PLAN:   Reagan was seen today for annual exam.    Diagnoses and all orders for this visit:    Hyperkalemia  -     Potassium; Future    Screening for prostate cancer  -     PSA, Screening; Future    Hypertension, unspecified type  -     valsartan (DIOVAN) 320 MG tablet; Take 1 tablet (320 mg total) by mouth once daily.  -     verapamiL (VERELAN) 360 MG C24P; Take 1 capsule (360 mg total) by mouth once daily.  -     Stress Echo Which stress agent will be used? Treadmill Exercise; Color  Flow Doppler? No; Future    Transient insomnia  -     ALPRAZolam (XANAX) 1 MG tablet; Take 1 tablet (1 mg total) by mouth nightly as needed.    Hyperlipidemia, unspecified hyperlipidemia type  -     lovastatin (MEVACOR) 40 MG tablet; Take 1 tablet by mouth nightly    Seasonal allergies  -     cetirizine (ZYRTEC) 10 MG tablet; Take 1 tablet (10 mg total) by mouth 2 (two) times daily. for 7 days    Otitis externa, unspecified chronicity, unspecified laterality, unspecified type  -     ofloxacin (FLOXIN) 0.3 % otic solution; Place 5 drops into both ears 2 (two) times daily.    Labs reviewed will contact pt with results of pending tests when available.  Pt declined any immunizations at this time

## 2020-12-03 NOTE — TELEPHONE ENCOUNTER
----- Message from Rosemary Lindo sent at 11/13/2017  1:20 PM CST -----  Contact: pt [phone]  Patient would like to get test results.  Name of test (lab, mammo, etc.):  Xray   Date of test:  11/10  Ordering provider: Keagan   Where was the test performed:  [facility]  Comments:      
Spoke with patient advised him of the released results per [facility] per Dr. Boogie.  Patient states he will go online to view the information.   
2 or 3 times a week

## 2021-02-02 ENCOUNTER — OFFICE VISIT (OUTPATIENT)
Dept: FAMILY MEDICINE | Facility: CLINIC | Age: 61
End: 2021-02-02
Payer: COMMERCIAL

## 2021-02-02 VITALS
OXYGEN SATURATION: 97 % | DIASTOLIC BLOOD PRESSURE: 90 MMHG | RESPIRATION RATE: 18 BRPM | SYSTOLIC BLOOD PRESSURE: 170 MMHG | BODY MASS INDEX: 41.75 KG/M2 | HEIGHT: 73 IN | WEIGHT: 315 LBS | HEART RATE: 60 BPM | TEMPERATURE: 98 F

## 2021-02-02 DIAGNOSIS — I49.3 PVC (PREMATURE VENTRICULAR CONTRACTION): ICD-10-CM

## 2021-02-02 DIAGNOSIS — I10 HYPERTENSION, BENIGN: Primary | ICD-10-CM

## 2021-02-02 DIAGNOSIS — K21.9 GASTROESOPHAGEAL REFLUX DISEASE WITHOUT ESOPHAGITIS: ICD-10-CM

## 2021-02-02 PROCEDURE — 93005 EKG 12-LEAD: ICD-10-PCS | Mod: S$GLB,,, | Performed by: FAMILY MEDICINE

## 2021-02-02 PROCEDURE — 99999 PR PBB SHADOW E&M-EST. PATIENT-LVL V: CPT | Mod: PBBFAC,,, | Performed by: FAMILY MEDICINE

## 2021-02-02 PROCEDURE — 3008F PR BODY MASS INDEX (BMI) DOCUMENTED: ICD-10-PCS | Mod: CPTII,S$GLB,, | Performed by: FAMILY MEDICINE

## 2021-02-02 PROCEDURE — 3077F PR MOST RECENT SYSTOLIC BLOOD PRESSURE >= 140 MM HG: ICD-10-PCS | Mod: CPTII,S$GLB,, | Performed by: FAMILY MEDICINE

## 2021-02-02 PROCEDURE — 99214 OFFICE O/P EST MOD 30 MIN: CPT | Mod: S$GLB,,, | Performed by: FAMILY MEDICINE

## 2021-02-02 PROCEDURE — 3077F SYST BP >= 140 MM HG: CPT | Mod: CPTII,S$GLB,, | Performed by: FAMILY MEDICINE

## 2021-02-02 PROCEDURE — 99999 PR PBB SHADOW E&M-EST. PATIENT-LVL V: ICD-10-PCS | Mod: PBBFAC,,, | Performed by: FAMILY MEDICINE

## 2021-02-02 PROCEDURE — 93005 ELECTROCARDIOGRAM TRACING: CPT | Mod: S$GLB,,, | Performed by: FAMILY MEDICINE

## 2021-02-02 PROCEDURE — 3080F PR MOST RECENT DIASTOLIC BLOOD PRESSURE >= 90 MM HG: ICD-10-PCS | Mod: CPTII,S$GLB,, | Performed by: FAMILY MEDICINE

## 2021-02-02 PROCEDURE — 1126F PR PAIN SEVERITY QUANTIFIED, NO PAIN PRESENT: ICD-10-PCS | Mod: S$GLB,,, | Performed by: FAMILY MEDICINE

## 2021-02-02 PROCEDURE — 93010 EKG 12-LEAD: ICD-10-PCS | Mod: S$GLB,,, | Performed by: INTERNAL MEDICINE

## 2021-02-02 PROCEDURE — 99214 PR OFFICE/OUTPT VISIT, EST, LEVL IV, 30-39 MIN: ICD-10-PCS | Mod: S$GLB,,, | Performed by: FAMILY MEDICINE

## 2021-02-02 PROCEDURE — 3080F DIAST BP >= 90 MM HG: CPT | Mod: CPTII,S$GLB,, | Performed by: FAMILY MEDICINE

## 2021-02-02 PROCEDURE — 1126F AMNT PAIN NOTED NONE PRSNT: CPT | Mod: S$GLB,,, | Performed by: FAMILY MEDICINE

## 2021-02-02 PROCEDURE — 93010 ELECTROCARDIOGRAM REPORT: CPT | Mod: S$GLB,,, | Performed by: INTERNAL MEDICINE

## 2021-02-02 PROCEDURE — 3008F BODY MASS INDEX DOCD: CPT | Mod: CPTII,S$GLB,, | Performed by: FAMILY MEDICINE

## 2021-02-02 RX ORDER — CHLORTHALIDONE 25 MG/1
25 TABLET ORAL DAILY
Qty: 30 TABLET | Refills: 11 | Status: SHIPPED | OUTPATIENT
Start: 2021-02-02 | End: 2021-08-11 | Stop reason: SDUPTHER

## 2021-02-05 ENCOUNTER — TELEPHONE (OUTPATIENT)
Dept: PRIMARY CARE CLINIC | Facility: CLINIC | Age: 61
End: 2021-02-05

## 2021-02-05 ENCOUNTER — OFFICE VISIT (OUTPATIENT)
Dept: CARDIOLOGY | Facility: CLINIC | Age: 61
End: 2021-02-05
Payer: COMMERCIAL

## 2021-02-05 VITALS
OXYGEN SATURATION: 94 % | HEART RATE: 87 BPM | HEIGHT: 73 IN | BODY MASS INDEX: 41.75 KG/M2 | SYSTOLIC BLOOD PRESSURE: 162 MMHG | DIASTOLIC BLOOD PRESSURE: 86 MMHG | WEIGHT: 315 LBS

## 2021-02-05 DIAGNOSIS — R00.2 PALPITATIONS: ICD-10-CM

## 2021-02-05 DIAGNOSIS — I49.3 PVC (PREMATURE VENTRICULAR CONTRACTION): ICD-10-CM

## 2021-02-05 DIAGNOSIS — I10 ESSENTIAL HYPERTENSION: Primary | ICD-10-CM

## 2021-02-05 PROCEDURE — 1126F AMNT PAIN NOTED NONE PRSNT: CPT | Mod: S$GLB,,, | Performed by: INTERNAL MEDICINE

## 2021-02-05 PROCEDURE — 93000 EKG 12-LEAD: ICD-10-PCS | Mod: S$GLB,,, | Performed by: INTERNAL MEDICINE

## 2021-02-05 PROCEDURE — 3079F PR MOST RECENT DIASTOLIC BLOOD PRESSURE 80-89 MM HG: ICD-10-PCS | Mod: CPTII,S$GLB,, | Performed by: INTERNAL MEDICINE

## 2021-02-05 PROCEDURE — 99999 PR PBB SHADOW E&M-EST. PATIENT-LVL IV: ICD-10-PCS | Mod: PBBFAC,,, | Performed by: INTERNAL MEDICINE

## 2021-02-05 PROCEDURE — 3079F DIAST BP 80-89 MM HG: CPT | Mod: CPTII,S$GLB,, | Performed by: INTERNAL MEDICINE

## 2021-02-05 PROCEDURE — 1126F PR PAIN SEVERITY QUANTIFIED, NO PAIN PRESENT: ICD-10-PCS | Mod: S$GLB,,, | Performed by: INTERNAL MEDICINE

## 2021-02-05 PROCEDURE — 3077F SYST BP >= 140 MM HG: CPT | Mod: CPTII,S$GLB,, | Performed by: INTERNAL MEDICINE

## 2021-02-05 PROCEDURE — 99999 PR PBB SHADOW E&M-EST. PATIENT-LVL IV: CPT | Mod: PBBFAC,,, | Performed by: INTERNAL MEDICINE

## 2021-02-05 PROCEDURE — 3077F PR MOST RECENT SYSTOLIC BLOOD PRESSURE >= 140 MM HG: ICD-10-PCS | Mod: CPTII,S$GLB,, | Performed by: INTERNAL MEDICINE

## 2021-02-05 PROCEDURE — 99204 PR OFFICE/OUTPT VISIT, NEW, LEVL IV, 45-59 MIN: ICD-10-PCS | Mod: S$GLB,,, | Performed by: INTERNAL MEDICINE

## 2021-02-05 PROCEDURE — 93000 ELECTROCARDIOGRAM COMPLETE: CPT | Mod: S$GLB,,, | Performed by: INTERNAL MEDICINE

## 2021-02-05 PROCEDURE — 3008F PR BODY MASS INDEX (BMI) DOCUMENTED: ICD-10-PCS | Mod: CPTII,S$GLB,, | Performed by: INTERNAL MEDICINE

## 2021-02-05 PROCEDURE — 3008F BODY MASS INDEX DOCD: CPT | Mod: CPTII,S$GLB,, | Performed by: INTERNAL MEDICINE

## 2021-02-05 PROCEDURE — 99204 OFFICE O/P NEW MOD 45 MIN: CPT | Mod: S$GLB,,, | Performed by: INTERNAL MEDICINE

## 2021-02-11 ENCOUNTER — LAB VISIT (OUTPATIENT)
Dept: LAB | Facility: HOSPITAL | Age: 61
End: 2021-02-11
Attending: FAMILY MEDICINE
Payer: COMMERCIAL

## 2021-02-11 ENCOUNTER — OFFICE VISIT (OUTPATIENT)
Dept: PRIMARY CARE CLINIC | Facility: CLINIC | Age: 61
End: 2021-02-11
Payer: COMMERCIAL

## 2021-02-11 VITALS
BODY MASS INDEX: 41.75 KG/M2 | HEART RATE: 70 BPM | DIASTOLIC BLOOD PRESSURE: 72 MMHG | HEIGHT: 73 IN | WEIGHT: 315 LBS | TEMPERATURE: 98 F | OXYGEN SATURATION: 95 % | SYSTOLIC BLOOD PRESSURE: 130 MMHG

## 2021-02-11 DIAGNOSIS — E74.39 GLUCOSE INTOLERANCE: ICD-10-CM

## 2021-02-11 DIAGNOSIS — R07.9 CHEST PAIN, UNSPECIFIED TYPE: Primary | ICD-10-CM

## 2021-02-11 DIAGNOSIS — K21.9 GASTROESOPHAGEAL REFLUX DISEASE, UNSPECIFIED WHETHER ESOPHAGITIS PRESENT: ICD-10-CM

## 2021-02-11 DIAGNOSIS — R10.9 ABDOMINAL PAIN, UNSPECIFIED ABDOMINAL LOCATION: ICD-10-CM

## 2021-02-11 LAB
ALBUMIN SERPL BCP-MCNC: 4.2 G/DL (ref 3.5–5.2)
ALP SERPL-CCNC: 78 U/L (ref 55–135)
ALT SERPL W/O P-5'-P-CCNC: 46 U/L (ref 10–44)
ANION GAP SERPL CALC-SCNC: 13 MMOL/L (ref 8–16)
AST SERPL-CCNC: 29 U/L (ref 10–40)
BASOPHILS # BLD AUTO: 0.05 K/UL (ref 0–0.2)
BASOPHILS NFR BLD: 0.7 % (ref 0–1.9)
BILIRUB SERPL-MCNC: 0.7 MG/DL (ref 0.1–1)
BUN SERPL-MCNC: 14 MG/DL (ref 8–23)
CALCIUM SERPL-MCNC: 9.3 MG/DL (ref 8.7–10.5)
CHLORIDE SERPL-SCNC: 98 MMOL/L (ref 95–110)
CHOLEST SERPL-MCNC: 184 MG/DL (ref 120–199)
CHOLEST/HDLC SERPL: 5.9 {RATIO} (ref 2–5)
CO2 SERPL-SCNC: 27 MMOL/L (ref 23–29)
CREAT SERPL-MCNC: 0.9 MG/DL (ref 0.5–1.4)
DIFFERENTIAL METHOD: NORMAL
EOSINOPHIL # BLD AUTO: 0.2 K/UL (ref 0–0.5)
EOSINOPHIL NFR BLD: 2.1 % (ref 0–8)
ERYTHROCYTE [DISTWIDTH] IN BLOOD BY AUTOMATED COUNT: 13.4 % (ref 11.5–14.5)
EST. GFR  (AFRICAN AMERICAN): >60 ML/MIN/1.73 M^2
EST. GFR  (NON AFRICAN AMERICAN): >60 ML/MIN/1.73 M^2
ESTIMATED AVG GLUCOSE: 111 MG/DL (ref 68–131)
GLUCOSE SERPL-MCNC: 92 MG/DL (ref 70–110)
HBA1C MFR BLD: 5.5 % (ref 4–5.6)
HCT VFR BLD AUTO: 44.6 % (ref 40–54)
HDLC SERPL-MCNC: 31 MG/DL (ref 40–75)
HDLC SERPL: 16.8 % (ref 20–50)
HGB BLD-MCNC: 15 G/DL (ref 14–18)
IMM GRANULOCYTES # BLD AUTO: 0.01 K/UL (ref 0–0.04)
IMM GRANULOCYTES NFR BLD AUTO: 0.1 % (ref 0–0.5)
LDLC SERPL CALC-MCNC: 126.2 MG/DL (ref 63–159)
LYMPHOCYTES # BLD AUTO: 2.1 K/UL (ref 1–4.8)
LYMPHOCYTES NFR BLD: 29.1 % (ref 18–48)
MCH RBC QN AUTO: 30.2 PG (ref 27–31)
MCHC RBC AUTO-ENTMCNC: 33.6 G/DL (ref 32–36)
MCV RBC AUTO: 90 FL (ref 82–98)
MONOCYTES # BLD AUTO: 0.7 K/UL (ref 0.3–1)
MONOCYTES NFR BLD: 10.2 % (ref 4–15)
NEUTROPHILS # BLD AUTO: 4.2 K/UL (ref 1.8–7.7)
NEUTROPHILS NFR BLD: 57.8 % (ref 38–73)
NONHDLC SERPL-MCNC: 153 MG/DL
NRBC BLD-RTO: 0 /100 WBC
PLATELET # BLD AUTO: 257 K/UL (ref 150–350)
PMV BLD AUTO: 11.1 FL (ref 9.2–12.9)
POTASSIUM SERPL-SCNC: 3.4 MMOL/L (ref 3.5–5.1)
PROT SERPL-MCNC: 7.5 G/DL (ref 6–8.4)
RBC # BLD AUTO: 4.97 M/UL (ref 4.6–6.2)
SODIUM SERPL-SCNC: 138 MMOL/L (ref 136–145)
TRIGL SERPL-MCNC: 134 MG/DL (ref 30–150)
TSH SERPL DL<=0.005 MIU/L-ACNC: 1.46 UIU/ML (ref 0.4–4)
WBC # BLD AUTO: 7.26 K/UL (ref 3.9–12.7)

## 2021-02-11 PROCEDURE — 84443 ASSAY THYROID STIM HORMONE: CPT

## 2021-02-11 PROCEDURE — 99999 PR PBB SHADOW E&M-EST. PATIENT-LVL V: CPT | Mod: PBBFAC,,, | Performed by: FAMILY MEDICINE

## 2021-02-11 PROCEDURE — 85025 COMPLETE CBC W/AUTO DIFF WBC: CPT

## 2021-02-11 PROCEDURE — 83036 HEMOGLOBIN GLYCOSYLATED A1C: CPT

## 2021-02-11 PROCEDURE — 3008F BODY MASS INDEX DOCD: CPT | Mod: CPTII,S$GLB,, | Performed by: FAMILY MEDICINE

## 2021-02-11 PROCEDURE — 3078F PR MOST RECENT DIASTOLIC BLOOD PRESSURE < 80 MM HG: ICD-10-PCS | Mod: CPTII,S$GLB,, | Performed by: FAMILY MEDICINE

## 2021-02-11 PROCEDURE — 99214 OFFICE O/P EST MOD 30 MIN: CPT | Mod: S$GLB,,, | Performed by: FAMILY MEDICINE

## 2021-02-11 PROCEDURE — 3008F PR BODY MASS INDEX (BMI) DOCUMENTED: ICD-10-PCS | Mod: CPTII,S$GLB,, | Performed by: FAMILY MEDICINE

## 2021-02-11 PROCEDURE — 1126F PR PAIN SEVERITY QUANTIFIED, NO PAIN PRESENT: ICD-10-PCS | Mod: S$GLB,,, | Performed by: FAMILY MEDICINE

## 2021-02-11 PROCEDURE — 1126F AMNT PAIN NOTED NONE PRSNT: CPT | Mod: S$GLB,,, | Performed by: FAMILY MEDICINE

## 2021-02-11 PROCEDURE — 36415 COLL VENOUS BLD VENIPUNCTURE: CPT | Mod: PN

## 2021-02-11 PROCEDURE — 99214 PR OFFICE/OUTPT VISIT, EST, LEVL IV, 30-39 MIN: ICD-10-PCS | Mod: S$GLB,,, | Performed by: FAMILY MEDICINE

## 2021-02-11 PROCEDURE — 99999 PR PBB SHADOW E&M-EST. PATIENT-LVL V: ICD-10-PCS | Mod: PBBFAC,,, | Performed by: FAMILY MEDICINE

## 2021-02-11 PROCEDURE — 80061 LIPID PANEL: CPT

## 2021-02-11 PROCEDURE — 3075F SYST BP GE 130 - 139MM HG: CPT | Mod: CPTII,S$GLB,, | Performed by: FAMILY MEDICINE

## 2021-02-11 PROCEDURE — 80053 COMPREHEN METABOLIC PANEL: CPT

## 2021-02-11 PROCEDURE — 3078F DIAST BP <80 MM HG: CPT | Mod: CPTII,S$GLB,, | Performed by: FAMILY MEDICINE

## 2021-02-11 PROCEDURE — 3075F PR MOST RECENT SYSTOLIC BLOOD PRESS GE 130-139MM HG: ICD-10-PCS | Mod: CPTII,S$GLB,, | Performed by: FAMILY MEDICINE

## 2021-02-11 RX ORDER — HYOSCYAMINE SULFATE 0.125 MG
125 TABLET ORAL EVERY 6 HOURS PRN
Qty: 40 TABLET | Refills: 2 | Status: SHIPPED | OUTPATIENT
Start: 2021-02-11 | End: 2024-02-16

## 2021-02-11 RX ORDER — FAMOTIDINE 40 MG/1
40 TABLET, FILM COATED ORAL DAILY
Qty: 30 TABLET | Refills: 11 | Status: SHIPPED | OUTPATIENT
Start: 2021-02-11 | End: 2021-08-11 | Stop reason: SDUPTHER

## 2021-02-12 ENCOUNTER — PATIENT MESSAGE (OUTPATIENT)
Dept: PRIMARY CARE CLINIC | Facility: CLINIC | Age: 61
End: 2021-02-12

## 2021-02-12 DIAGNOSIS — E87.6 HYPOKALEMIA: Primary | ICD-10-CM

## 2021-02-12 RX ORDER — POTASSIUM CHLORIDE 20 MEQ/1
20 TABLET, EXTENDED RELEASE ORAL 2 TIMES DAILY
Qty: 90 TABLET | Refills: 1 | Status: SHIPPED | OUTPATIENT
Start: 2021-02-12 | End: 2021-08-11 | Stop reason: SDUPTHER

## 2021-02-18 ENCOUNTER — HOSPITAL ENCOUNTER (OUTPATIENT)
Dept: CARDIOLOGY | Facility: HOSPITAL | Age: 61
Discharge: HOME | End: 2021-02-18
Attending: INTERNAL MEDICINE
Payer: COMMERCIAL

## 2021-02-18 DIAGNOSIS — I10 ESSENTIAL HYPERTENSION: ICD-10-CM

## 2021-02-18 DIAGNOSIS — I49.3 PVC (PREMATURE VENTRICULAR CONTRACTION): ICD-10-CM

## 2021-02-18 DIAGNOSIS — R00.2 PALPITATIONS: ICD-10-CM

## 2021-02-18 LAB
ASCENDING AORTA: 3.61 CM
AV INDEX (PROSTH): 0.87
AV MEAN GRADIENT: 5 MMHG
AV PEAK GRADIENT: 8 MMHG
AV VALVE AREA: 4.23 CM2
AV VELOCITY RATIO: 0.89
CV ECHO LV RWT: 0.39 CM
DOP CALC AO PEAK VEL: 1.44 M/S
DOP CALC AO VTI: 32.66 CM
DOP CALC LVOT AREA: 4.9 CM2
DOP CALC LVOT DIAMETER: 2.49 CM
DOP CALC LVOT PEAK VEL: 1.28 M/S
DOP CALC LVOT STROKE VOLUME: 138.08 CM3
DOP CALCLVOT PEAK VEL VTI: 28.37 CM
E WAVE DECELERATION TIME: 241.03 MSEC
E/A RATIO: 0.97
E/E' RATIO: 13.25 M/S
ECHO LV POSTERIOR WALL: 1.3 CM (ref 0.6–1.1)
FRACTIONAL SHORTENING: 27 % (ref 28–44)
INTERVENTRICULAR SEPTUM: 1.49 CM (ref 0.6–1.1)
IVRT: 83.04 MSEC
LA MAJOR: 6.84 CM
LA MINOR: 6.11 CM
LA WIDTH: 3.98 CM
LEFT ATRIUM SIZE: 4.17 CM
LEFT ATRIUM VOLUME: 91.05 CM3
LEFT INTERNAL DIMENSION IN SYSTOLE: 4.88 CM (ref 2.1–4)
LEFT VENTRICLE DIASTOLIC VOLUME: 232.04 ML
LEFT VENTRICLE SYSTOLIC VOLUME: 111.59 ML
LEFT VENTRICULAR INTERNAL DIMENSION IN DIASTOLE: 6.71 CM (ref 3.5–6)
LEFT VENTRICULAR MASS: 462.58 G
LV LATERAL E/E' RATIO: 13.25 M/S
LV SEPTAL E/E' RATIO: 13.25 M/S
MV PEAK A VEL: 1.09 M/S
MV PEAK E VEL: 1.06 M/S
MV STENOSIS PRESSURE HALF TIME: 114.92 MS
MV VALVE AREA P 1/2 METHOD: 1.91 CM2
PISA TR MAX VEL: 1.37 M/S
PV PEAK VELOCITY: 1.1 CM/S
RA MAJOR: 6.18 CM
RA WIDTH: 4.36 CM
RIGHT VENTRICULAR END-DIASTOLIC DIMENSION: 3.75 CM
RV TISSUE DOPPLER FREE WALL SYSTOLIC VELOCITY 1 (APICAL 4 CHAMBER VIEW): 11.68 CM/S
SINUS: 4.18 CM
STJ: 3.05 CM
TDI LATERAL: 0.08 M/S
TDI SEPTAL: 0.08 M/S
TDI: 0.08 M/S
TR MAX PG: 8 MMHG
TRICUSPID ANNULAR PLANE SYSTOLIC EXCURSION: 2.72 CM

## 2021-02-18 PROCEDURE — 93226 XTRNL ECG REC<48 HR SCAN A/R: CPT

## 2021-02-18 PROCEDURE — 93227 XTRNL ECG REC<48 HR R&I: CPT | Mod: ,,, | Performed by: INTERNAL MEDICINE

## 2021-02-18 PROCEDURE — 93306 TTE W/DOPPLER COMPLETE: CPT | Mod: 26,,, | Performed by: INTERNAL MEDICINE

## 2021-02-18 PROCEDURE — 93306 ECHO (CUPID ONLY): ICD-10-PCS | Mod: 26,,, | Performed by: INTERNAL MEDICINE

## 2021-02-18 PROCEDURE — 93306 TTE W/DOPPLER COMPLETE: CPT

## 2021-02-18 PROCEDURE — 93227 HOLTER MONITOR - 24 HOUR (CUPID ONLY): ICD-10-PCS | Mod: ,,, | Performed by: INTERNAL MEDICINE

## 2021-02-22 LAB
OHS CV EVENT MONITOR DAY: 0
OHS CV HOLTER LENGTH DECIMAL HOURS: 23.98
OHS CV HOLTER LENGTH HOURS: 23
OHS CV HOLTER LENGTH MINUTES: 59

## 2021-03-02 ENCOUNTER — OFFICE VISIT (OUTPATIENT)
Dept: CARDIOLOGY | Facility: CLINIC | Age: 61
End: 2021-03-02
Payer: COMMERCIAL

## 2021-03-02 VITALS
WEIGHT: 315 LBS | BODY MASS INDEX: 41.75 KG/M2 | HEIGHT: 73 IN | HEART RATE: 74 BPM | SYSTOLIC BLOOD PRESSURE: 170 MMHG | DIASTOLIC BLOOD PRESSURE: 90 MMHG | OXYGEN SATURATION: 97 %

## 2021-03-02 DIAGNOSIS — I10 ESSENTIAL HYPERTENSION: Primary | ICD-10-CM

## 2021-03-02 DIAGNOSIS — R07.9 CHEST PAIN, UNSPECIFIED TYPE: ICD-10-CM

## 2021-03-02 DIAGNOSIS — R00.2 PALPITATIONS: ICD-10-CM

## 2021-03-02 PROCEDURE — 99999 PR PBB SHADOW E&M-EST. PATIENT-LVL V: ICD-10-PCS | Mod: PBBFAC,,, | Performed by: INTERNAL MEDICINE

## 2021-03-02 PROCEDURE — 3008F PR BODY MASS INDEX (BMI) DOCUMENTED: ICD-10-PCS | Mod: CPTII,S$GLB,, | Performed by: INTERNAL MEDICINE

## 2021-03-02 PROCEDURE — 99214 OFFICE O/P EST MOD 30 MIN: CPT | Mod: S$GLB,,, | Performed by: INTERNAL MEDICINE

## 2021-03-02 PROCEDURE — 1126F PR PAIN SEVERITY QUANTIFIED, NO PAIN PRESENT: ICD-10-PCS | Mod: S$GLB,,, | Performed by: INTERNAL MEDICINE

## 2021-03-02 PROCEDURE — 1126F AMNT PAIN NOTED NONE PRSNT: CPT | Mod: S$GLB,,, | Performed by: INTERNAL MEDICINE

## 2021-03-02 PROCEDURE — 3080F DIAST BP >= 90 MM HG: CPT | Mod: CPTII,S$GLB,, | Performed by: INTERNAL MEDICINE

## 2021-03-02 PROCEDURE — 3077F PR MOST RECENT SYSTOLIC BLOOD PRESSURE >= 140 MM HG: ICD-10-PCS | Mod: CPTII,S$GLB,, | Performed by: INTERNAL MEDICINE

## 2021-03-02 PROCEDURE — 3080F PR MOST RECENT DIASTOLIC BLOOD PRESSURE >= 90 MM HG: ICD-10-PCS | Mod: CPTII,S$GLB,, | Performed by: INTERNAL MEDICINE

## 2021-03-02 PROCEDURE — 99999 PR PBB SHADOW E&M-EST. PATIENT-LVL V: CPT | Mod: PBBFAC,,, | Performed by: INTERNAL MEDICINE

## 2021-03-02 PROCEDURE — 99214 PR OFFICE/OUTPT VISIT, EST, LEVL IV, 30-39 MIN: ICD-10-PCS | Mod: S$GLB,,, | Performed by: INTERNAL MEDICINE

## 2021-03-02 PROCEDURE — 3008F BODY MASS INDEX DOCD: CPT | Mod: CPTII,S$GLB,, | Performed by: INTERNAL MEDICINE

## 2021-03-02 PROCEDURE — 3077F SYST BP >= 140 MM HG: CPT | Mod: CPTII,S$GLB,, | Performed by: INTERNAL MEDICINE

## 2021-03-02 RX ORDER — METOPROLOL TARTRATE 25 MG/1
25 TABLET, FILM COATED ORAL 2 TIMES DAILY PRN
Qty: 60 TABLET | Refills: 11 | Status: SHIPPED | OUTPATIENT
Start: 2021-03-02 | End: 2021-08-11 | Stop reason: SDUPTHER

## 2021-03-12 ENCOUNTER — TELEPHONE (OUTPATIENT)
Dept: PRIMARY CARE CLINIC | Facility: CLINIC | Age: 61
End: 2021-03-12

## 2021-03-12 DIAGNOSIS — K21.9 GASTROESOPHAGEAL REFLUX DISEASE, UNSPECIFIED WHETHER ESOPHAGITIS PRESENT: Primary | ICD-10-CM

## 2021-03-31 ENCOUNTER — TELEPHONE (OUTPATIENT)
Dept: PRIMARY CARE CLINIC | Facility: CLINIC | Age: 61
End: 2021-03-31

## 2021-06-03 ENCOUNTER — TELEPHONE (OUTPATIENT)
Dept: PRIMARY CARE CLINIC | Facility: CLINIC | Age: 61
End: 2021-06-03

## 2021-06-03 DIAGNOSIS — Z00.00 ROUTINE GENERAL MEDICAL EXAMINATION AT A HEALTH CARE FACILITY: Primary | ICD-10-CM

## 2021-08-04 ENCOUNTER — LAB VISIT (OUTPATIENT)
Dept: LAB | Facility: HOSPITAL | Age: 61
End: 2021-08-04
Attending: FAMILY MEDICINE
Payer: COMMERCIAL

## 2021-08-04 DIAGNOSIS — Z00.00 ROUTINE GENERAL MEDICAL EXAMINATION AT A HEALTH CARE FACILITY: ICD-10-CM

## 2021-08-04 LAB
ALBUMIN SERPL BCP-MCNC: 3.8 G/DL (ref 3.5–5.2)
ALP SERPL-CCNC: 75 U/L (ref 55–135)
ALT SERPL W/O P-5'-P-CCNC: 29 U/L (ref 10–44)
ANION GAP SERPL CALC-SCNC: 9 MMOL/L (ref 8–16)
AST SERPL-CCNC: 21 U/L (ref 10–40)
BASOPHILS # BLD AUTO: 0.05 K/UL (ref 0–0.2)
BASOPHILS NFR BLD: 0.7 % (ref 0–1.9)
BILIRUB SERPL-MCNC: 0.8 MG/DL (ref 0.1–1)
BUN SERPL-MCNC: 10 MG/DL (ref 8–23)
CALCIUM SERPL-MCNC: 9.6 MG/DL (ref 8.7–10.5)
CHLORIDE SERPL-SCNC: 104 MMOL/L (ref 95–110)
CHOLEST SERPL-MCNC: 195 MG/DL (ref 120–199)
CHOLEST/HDLC SERPL: 5.7 {RATIO} (ref 2–5)
CO2 SERPL-SCNC: 30 MMOL/L (ref 23–29)
CREAT SERPL-MCNC: 0.9 MG/DL (ref 0.5–1.4)
DIFFERENTIAL METHOD: NORMAL
EOSINOPHIL # BLD AUTO: 0.2 K/UL (ref 0–0.5)
EOSINOPHIL NFR BLD: 2.6 % (ref 0–8)
ERYTHROCYTE [DISTWIDTH] IN BLOOD BY AUTOMATED COUNT: 14.1 % (ref 11.5–14.5)
EST. GFR  (AFRICAN AMERICAN): >60 ML/MIN/1.73 M^2
EST. GFR  (NON AFRICAN AMERICAN): >60 ML/MIN/1.73 M^2
ESTIMATED AVG GLUCOSE: 114 MG/DL (ref 68–131)
GLUCOSE SERPL-MCNC: 98 MG/DL (ref 70–110)
HBA1C MFR BLD: 5.6 % (ref 4–5.6)
HCT VFR BLD AUTO: 42.3 % (ref 40–54)
HDLC SERPL-MCNC: 34 MG/DL (ref 40–75)
HDLC SERPL: 17.4 % (ref 20–50)
HGB BLD-MCNC: 14 G/DL (ref 14–18)
IMM GRANULOCYTES # BLD AUTO: 0.02 K/UL (ref 0–0.04)
IMM GRANULOCYTES NFR BLD AUTO: 0.3 % (ref 0–0.5)
LDLC SERPL CALC-MCNC: 133 MG/DL (ref 63–159)
LYMPHOCYTES # BLD AUTO: 2.3 K/UL (ref 1–4.8)
LYMPHOCYTES NFR BLD: 31.4 % (ref 18–48)
MCH RBC QN AUTO: 29.5 PG (ref 27–31)
MCHC RBC AUTO-ENTMCNC: 33.1 G/DL (ref 32–36)
MCV RBC AUTO: 89 FL (ref 82–98)
MONOCYTES # BLD AUTO: 0.7 K/UL (ref 0.3–1)
MONOCYTES NFR BLD: 9.9 % (ref 4–15)
NEUTROPHILS # BLD AUTO: 4.1 K/UL (ref 1.8–7.7)
NEUTROPHILS NFR BLD: 55.1 % (ref 38–73)
NONHDLC SERPL-MCNC: 161 MG/DL
NRBC BLD-RTO: 0 /100 WBC
PLATELET # BLD AUTO: 239 K/UL (ref 150–450)
PMV BLD AUTO: 10.5 FL (ref 9.2–12.9)
POTASSIUM SERPL-SCNC: 4.3 MMOL/L (ref 3.5–5.1)
PROT SERPL-MCNC: 7.1 G/DL (ref 6–8.4)
RBC # BLD AUTO: 4.74 M/UL (ref 4.6–6.2)
SODIUM SERPL-SCNC: 143 MMOL/L (ref 136–145)
TRIGL SERPL-MCNC: 140 MG/DL (ref 30–150)
TSH SERPL DL<=0.005 MIU/L-ACNC: 1.6 UIU/ML (ref 0.4–4)
WBC # BLD AUTO: 7.45 K/UL (ref 3.9–12.7)

## 2021-08-04 PROCEDURE — 84443 ASSAY THYROID STIM HORMONE: CPT | Performed by: FAMILY MEDICINE

## 2021-08-04 PROCEDURE — 36415 COLL VENOUS BLD VENIPUNCTURE: CPT | Mod: PO | Performed by: FAMILY MEDICINE

## 2021-08-04 PROCEDURE — 85025 COMPLETE CBC W/AUTO DIFF WBC: CPT | Performed by: FAMILY MEDICINE

## 2021-08-04 PROCEDURE — 83036 HEMOGLOBIN GLYCOSYLATED A1C: CPT | Performed by: FAMILY MEDICINE

## 2021-08-04 PROCEDURE — 80061 LIPID PANEL: CPT | Performed by: FAMILY MEDICINE

## 2021-08-04 PROCEDURE — 80053 COMPREHEN METABOLIC PANEL: CPT | Performed by: FAMILY MEDICINE

## 2021-08-11 ENCOUNTER — HOSPITAL ENCOUNTER (OUTPATIENT)
Dept: RADIOLOGY | Facility: HOSPITAL | Age: 61
Discharge: HOME | End: 2021-08-11
Attending: FAMILY MEDICINE
Payer: COMMERCIAL

## 2021-08-11 ENCOUNTER — OFFICE VISIT (OUTPATIENT)
Dept: PRIMARY CARE CLINIC | Facility: CLINIC | Age: 61
End: 2021-08-11
Payer: COMMERCIAL

## 2021-08-11 VITALS
TEMPERATURE: 98 F | RESPIRATION RATE: 19 BRPM | BODY MASS INDEX: 41.75 KG/M2 | HEIGHT: 73 IN | SYSTOLIC BLOOD PRESSURE: 127 MMHG | HEART RATE: 81 BPM | OXYGEN SATURATION: 98 % | WEIGHT: 315 LBS | DIASTOLIC BLOOD PRESSURE: 80 MMHG

## 2021-08-11 DIAGNOSIS — E87.6 HYPOKALEMIA: ICD-10-CM

## 2021-08-11 DIAGNOSIS — H66.001 ACUTE SUPPURATIVE OTITIS MEDIA OF RIGHT EAR WITHOUT SPONTANEOUS RUPTURE OF TYMPANIC MEMBRANE, RECURRENCE NOT SPECIFIED: ICD-10-CM

## 2021-08-11 DIAGNOSIS — K21.9 GASTROESOPHAGEAL REFLUX DISEASE, UNSPECIFIED WHETHER ESOPHAGITIS PRESENT: ICD-10-CM

## 2021-08-11 DIAGNOSIS — E66.9 OBESITY, UNSPECIFIED CLASSIFICATION, UNSPECIFIED OBESITY TYPE, UNSPECIFIED WHETHER SERIOUS COMORBIDITY PRESENT: ICD-10-CM

## 2021-08-11 DIAGNOSIS — M79.672 FOOT PAIN, BILATERAL: ICD-10-CM

## 2021-08-11 DIAGNOSIS — K63.5 POLYP OF COLON, UNSPECIFIED PART OF COLON, UNSPECIFIED TYPE: Primary | ICD-10-CM

## 2021-08-11 DIAGNOSIS — M79.671 FOOT PAIN, BILATERAL: ICD-10-CM

## 2021-08-11 DIAGNOSIS — E78.5 HYPERLIPIDEMIA, UNSPECIFIED HYPERLIPIDEMIA TYPE: ICD-10-CM

## 2021-08-11 DIAGNOSIS — J30.2 SEASONAL ALLERGIES: ICD-10-CM

## 2021-08-11 DIAGNOSIS — I10 HYPERTENSION, UNSPECIFIED TYPE: ICD-10-CM

## 2021-08-11 DIAGNOSIS — T14.8XXA WOUND DISCHARGE: ICD-10-CM

## 2021-08-11 DIAGNOSIS — R60.9 EDEMA, UNSPECIFIED TYPE: ICD-10-CM

## 2021-08-11 DIAGNOSIS — I10 HYPERTENSION, BENIGN: ICD-10-CM

## 2021-08-11 PROCEDURE — 99999 PR PBB SHADOW E&M-EST. PATIENT-LVL V: ICD-10-PCS | Mod: PBBFAC,,, | Performed by: FAMILY MEDICINE

## 2021-08-11 PROCEDURE — 1126F PR PAIN SEVERITY QUANTIFIED, NO PAIN PRESENT: ICD-10-PCS | Mod: CPTII,S$GLB,, | Performed by: FAMILY MEDICINE

## 2021-08-11 PROCEDURE — 73630 XR FOOT COMPLETE 3 VIEW BILATERAL: ICD-10-PCS | Mod: 26,,, | Performed by: RADIOLOGY

## 2021-08-11 PROCEDURE — 73630 X-RAY EXAM OF FOOT: CPT | Mod: 26,,, | Performed by: RADIOLOGY

## 2021-08-11 PROCEDURE — 3079F DIAST BP 80-89 MM HG: CPT | Mod: CPTII,S$GLB,, | Performed by: FAMILY MEDICINE

## 2021-08-11 PROCEDURE — 1159F MED LIST DOCD IN RCRD: CPT | Mod: CPTII,S$GLB,, | Performed by: FAMILY MEDICINE

## 2021-08-11 PROCEDURE — 1159F PR MEDICATION LIST DOCUMENTED IN MEDICAL RECORD: ICD-10-PCS | Mod: CPTII,S$GLB,, | Performed by: FAMILY MEDICINE

## 2021-08-11 PROCEDURE — 99999 PR PBB SHADOW E&M-EST. PATIENT-LVL V: CPT | Mod: PBBFAC,,, | Performed by: FAMILY MEDICINE

## 2021-08-11 PROCEDURE — 3044F HG A1C LEVEL LT 7.0%: CPT | Mod: CPTII,S$GLB,, | Performed by: FAMILY MEDICINE

## 2021-08-11 PROCEDURE — 3008F BODY MASS INDEX DOCD: CPT | Mod: CPTII,S$GLB,, | Performed by: FAMILY MEDICINE

## 2021-08-11 PROCEDURE — 3074F SYST BP LT 130 MM HG: CPT | Mod: CPTII,S$GLB,, | Performed by: FAMILY MEDICINE

## 2021-08-11 PROCEDURE — 3008F PR BODY MASS INDEX (BMI) DOCUMENTED: ICD-10-PCS | Mod: CPTII,S$GLB,, | Performed by: FAMILY MEDICINE

## 2021-08-11 PROCEDURE — 99214 OFFICE O/P EST MOD 30 MIN: CPT | Mod: S$GLB,,, | Performed by: FAMILY MEDICINE

## 2021-08-11 PROCEDURE — 3044F PR MOST RECENT HEMOGLOBIN A1C LEVEL <7.0%: ICD-10-PCS | Mod: CPTII,S$GLB,, | Performed by: FAMILY MEDICINE

## 2021-08-11 PROCEDURE — 99214 PR OFFICE/OUTPT VISIT, EST, LEVL IV, 30-39 MIN: ICD-10-PCS | Mod: S$GLB,,, | Performed by: FAMILY MEDICINE

## 2021-08-11 PROCEDURE — 3074F PR MOST RECENT SYSTOLIC BLOOD PRESSURE < 130 MM HG: ICD-10-PCS | Mod: CPTII,S$GLB,, | Performed by: FAMILY MEDICINE

## 2021-08-11 PROCEDURE — 3079F PR MOST RECENT DIASTOLIC BLOOD PRESSURE 80-89 MM HG: ICD-10-PCS | Mod: CPTII,S$GLB,, | Performed by: FAMILY MEDICINE

## 2021-08-11 PROCEDURE — 73630 X-RAY EXAM OF FOOT: CPT | Mod: TC,50,PN

## 2021-08-11 PROCEDURE — 1126F AMNT PAIN NOTED NONE PRSNT: CPT | Mod: CPTII,S$GLB,, | Performed by: FAMILY MEDICINE

## 2021-08-11 RX ORDER — LOVASTATIN 40 MG/1
TABLET ORAL
Qty: 90 TABLET | Refills: 3 | Status: SHIPPED | OUTPATIENT
Start: 2021-08-11 | End: 2022-11-28 | Stop reason: SDUPTHER

## 2021-08-11 RX ORDER — FAMOTIDINE 40 MG/1
40 TABLET, FILM COATED ORAL DAILY
Qty: 180 TABLET | Refills: 3 | Status: SHIPPED | OUTPATIENT
Start: 2021-08-11 | End: 2024-02-16

## 2021-08-11 RX ORDER — METOPROLOL TARTRATE 25 MG/1
25 TABLET, FILM COATED ORAL 2 TIMES DAILY PRN
Qty: 180 TABLET | Refills: 3 | Status: SHIPPED | OUTPATIENT
Start: 2021-08-11 | End: 2022-11-28

## 2021-08-11 RX ORDER — POTASSIUM CHLORIDE 20 MEQ/1
20 TABLET, EXTENDED RELEASE ORAL 2 TIMES DAILY
Qty: 180 TABLET | Refills: 3 | Status: SHIPPED | OUTPATIENT
Start: 2021-08-11 | End: 2022-11-28

## 2021-08-11 RX ORDER — AZITHROMYCIN 250 MG/1
TABLET, FILM COATED ORAL
Qty: 6 TABLET | Refills: 0 | Status: SHIPPED | OUTPATIENT
Start: 2021-08-11 | End: 2022-03-31 | Stop reason: ALTCHOICE

## 2021-08-11 RX ORDER — CETIRIZINE HYDROCHLORIDE 10 MG/1
10 TABLET ORAL 2 TIMES DAILY
Qty: 90 TABLET | Refills: 3 | Status: SHIPPED | OUTPATIENT
Start: 2021-08-11 | End: 2024-02-16 | Stop reason: SDUPTHER

## 2021-08-11 RX ORDER — VERAPAMIL HYDROCHLORIDE 360 MG/1
360 CAPSULE, DELAYED RELEASE PELLETS ORAL DAILY
Qty: 90 CAPSULE | Refills: 3 | Status: SHIPPED | OUTPATIENT
Start: 2021-08-11 | End: 2022-10-25 | Stop reason: SDUPTHER

## 2021-08-11 RX ORDER — MUPIROCIN 20 MG/G
OINTMENT TOPICAL 3 TIMES DAILY
Qty: 30 G | Refills: 1 | Status: SHIPPED | OUTPATIENT
Start: 2021-08-11 | End: 2022-11-28

## 2021-08-11 RX ORDER — CHLORTHALIDONE 25 MG/1
25 TABLET ORAL DAILY
Qty: 90 TABLET | Refills: 3 | Status: SHIPPED | OUTPATIENT
Start: 2021-08-11 | End: 2022-08-22

## 2021-10-01 ENCOUNTER — LAB VISIT (OUTPATIENT)
Dept: PRIMARY CARE CLINIC | Facility: OTHER | Age: 61
End: 2021-10-01
Payer: COMMERCIAL

## 2021-10-01 DIAGNOSIS — Z20.822 ENCOUNTER FOR LABORATORY TESTING FOR COVID-19 VIRUS: ICD-10-CM

## 2021-10-01 LAB — SARS-COV-2- CYCLE NUMBER: 22

## 2021-10-01 PROCEDURE — U0003 INFECTIOUS AGENT DETECTION BY NUCLEIC ACID (DNA OR RNA); SEVERE ACUTE RESPIRATORY SYNDROME CORONAVIRUS 2 (SARS-COV-2) (CORONAVIRUS DISEASE [COVID-19]), AMPLIFIED PROBE TECHNIQUE, MAKING USE OF HIGH THROUGHPUT TECHNOLOGIES AS DESCRIBED BY CMS-2020-01-R: HCPCS | Performed by: INTERNAL MEDICINE

## 2021-10-02 DIAGNOSIS — U07.1 COVID-19 VIRUS DETECTED: ICD-10-CM

## 2021-10-02 LAB — SARS-COV-2 RNA RESP QL NAA+PROBE: DETECTED

## 2021-10-03 ENCOUNTER — NURSE TRIAGE (OUTPATIENT)
Dept: ADMINISTRATIVE | Facility: CLINIC | Age: 61
End: 2021-10-03

## 2021-10-04 ENCOUNTER — PATIENT MESSAGE (OUTPATIENT)
Dept: INFECTIOUS DISEASES | Facility: HOSPITAL | Age: 61
End: 2021-10-04

## 2021-10-04 ENCOUNTER — TELEPHONE (OUTPATIENT)
Dept: PRIMARY CARE CLINIC | Facility: CLINIC | Age: 61
End: 2021-10-04

## 2021-10-04 ENCOUNTER — PATIENT OUTREACH (OUTPATIENT)
Dept: INFECTIOUS DISEASES | Facility: HOSPITAL | Age: 61
End: 2021-10-04

## 2021-10-17 ENCOUNTER — NURSE TRIAGE (OUTPATIENT)
Dept: ADMINISTRATIVE | Facility: CLINIC | Age: 61
End: 2021-10-17

## 2021-10-18 ENCOUNTER — TELEPHONE (OUTPATIENT)
Dept: PRIMARY CARE CLINIC | Facility: CLINIC | Age: 61
End: 2021-10-18

## 2021-11-12 ENCOUNTER — IMMUNIZATION (OUTPATIENT)
Dept: PHARMACY | Facility: CLINIC | Age: 61
End: 2021-11-12
Payer: COMMERCIAL

## 2021-11-12 DIAGNOSIS — Z23 NEED FOR VACCINATION: Primary | ICD-10-CM

## 2021-11-22 DIAGNOSIS — F51.02 TRANSIENT INSOMNIA: ICD-10-CM

## 2021-11-22 RX ORDER — ALPRAZOLAM 1 MG/1
1 TABLET ORAL NIGHTLY PRN
Qty: 30 TABLET | Refills: 0 | Status: SHIPPED | OUTPATIENT
Start: 2021-11-22 | End: 2022-02-07

## 2022-01-15 DIAGNOSIS — F51.02 TRANSIENT INSOMNIA: ICD-10-CM

## 2022-01-15 NOTE — TELEPHONE ENCOUNTER
No new care gaps identified.  Powered by [employer] [facility]. Reference number: [id number].   1/15/2022 11:03:26 AM CST

## 2022-01-18 RX ORDER — ALPRAZOLAM 1 MG/1
TABLET ORAL
Qty: 30 TABLET | Refills: 0 | OUTPATIENT
Start: 2022-01-18

## 2022-02-05 DIAGNOSIS — F51.02 TRANSIENT INSOMNIA: ICD-10-CM

## 2022-02-05 NOTE — TELEPHONE ENCOUNTER
No new care gaps identified.  Powered by [employer]. Reference number: [id number].   2/05/2022 1:45:36 PM CST

## 2022-02-07 RX ORDER — ALPRAZOLAM 1 MG/1
TABLET ORAL
Qty: 30 TABLET | Refills: 0 | Status: SHIPPED | OUTPATIENT
Start: 2022-02-07 | End: 2022-03-31 | Stop reason: SDUPTHER

## 2022-03-22 DIAGNOSIS — F51.02 TRANSIENT INSOMNIA: ICD-10-CM

## 2022-03-22 NOTE — TELEPHONE ENCOUNTER
No new care gaps identified.  Powered by [employer]. Reference number: [id number].   3/22/2022 6:14:47 PM CDT

## 2022-03-23 RX ORDER — ALPRAZOLAM 1 MG/1
TABLET ORAL
Qty: 30 TABLET | Refills: 0 | OUTPATIENT
Start: 2022-03-23

## 2022-03-30 ENCOUNTER — TELEPHONE (OUTPATIENT)
Dept: PRIMARY CARE CLINIC | Facility: CLINIC | Age: 62
End: 2022-03-30
Payer: COMMERCIAL

## 2022-03-30 NOTE — TELEPHONE ENCOUNTER
----- Message from Adriana Aguilar sent at 3/30/2022  8:07 AM CDT -----  Contact: Pt [phone]  Pt wife called and stated that the Pt has been having chest pains and does not want to let you know at the visit and she just wanted to make you aware.     Thank you

## 2022-03-31 ENCOUNTER — LAB VISIT (OUTPATIENT)
Dept: LAB | Facility: HOSPITAL | Age: 62
End: 2022-03-31
Attending: FAMILY MEDICINE
Payer: COMMERCIAL

## 2022-03-31 ENCOUNTER — OFFICE VISIT (OUTPATIENT)
Dept: PRIMARY CARE CLINIC | Facility: CLINIC | Age: 62
End: 2022-03-31
Payer: COMMERCIAL

## 2022-03-31 VITALS
HEIGHT: 73 IN | OXYGEN SATURATION: 98 % | DIASTOLIC BLOOD PRESSURE: 86 MMHG | WEIGHT: 315 LBS | BODY MASS INDEX: 41.75 KG/M2 | HEART RATE: 74 BPM | TEMPERATURE: 98 F | SYSTOLIC BLOOD PRESSURE: 132 MMHG

## 2022-03-31 DIAGNOSIS — R07.89 OTHER CHEST PAIN: ICD-10-CM

## 2022-03-31 DIAGNOSIS — R07.89 OTHER CHEST PAIN: Primary | ICD-10-CM

## 2022-03-31 DIAGNOSIS — Z12.5 SCREENING FOR PROSTATE CANCER: ICD-10-CM

## 2022-03-31 DIAGNOSIS — F51.02 TRANSIENT INSOMNIA: ICD-10-CM

## 2022-03-31 LAB
ALBUMIN SERPL BCP-MCNC: 4 G/DL (ref 3.5–5.2)
ALP SERPL-CCNC: 66 U/L (ref 55–135)
ALT SERPL W/O P-5'-P-CCNC: 24 U/L (ref 10–44)
ANION GAP SERPL CALC-SCNC: 11 MMOL/L (ref 8–16)
AST SERPL-CCNC: 21 U/L (ref 10–40)
BASOPHILS # BLD AUTO: 0.05 K/UL (ref 0–0.2)
BASOPHILS NFR BLD: 0.5 % (ref 0–1.9)
BILIRUB SERPL-MCNC: 1 MG/DL (ref 0.1–1)
BUN SERPL-MCNC: 11 MG/DL (ref 8–23)
CALCIUM SERPL-MCNC: 9.3 MG/DL (ref 8.7–10.5)
CHLORIDE SERPL-SCNC: 100 MMOL/L (ref 95–110)
CHOLEST SERPL-MCNC: 215 MG/DL (ref 120–199)
CHOLEST/HDLC SERPL: 5.8 {RATIO} (ref 2–5)
CO2 SERPL-SCNC: 29 MMOL/L (ref 23–29)
COMPLEXED PSA SERPL-MCNC: 0.63 NG/ML (ref 0–4)
CREAT SERPL-MCNC: 0.8 MG/DL (ref 0.5–1.4)
DIFFERENTIAL METHOD: NORMAL
EOSINOPHIL # BLD AUTO: 0.2 K/UL (ref 0–0.5)
EOSINOPHIL NFR BLD: 2 % (ref 0–8)
ERYTHROCYTE [DISTWIDTH] IN BLOOD BY AUTOMATED COUNT: 14.1 % (ref 11.5–14.5)
EST. GFR  (AFRICAN AMERICAN): >60 ML/MIN/1.73 M^2
EST. GFR  (NON AFRICAN AMERICAN): >60 ML/MIN/1.73 M^2
GLUCOSE SERPL-MCNC: 86 MG/DL (ref 70–110)
HCT VFR BLD AUTO: 46 % (ref 40–54)
HDLC SERPL-MCNC: 37 MG/DL (ref 40–75)
HDLC SERPL: 17.2 % (ref 20–50)
HGB BLD-MCNC: 15 G/DL (ref 14–18)
IMM GRANULOCYTES # BLD AUTO: 0.03 K/UL (ref 0–0.04)
IMM GRANULOCYTES NFR BLD AUTO: 0.3 % (ref 0–0.5)
LDLC SERPL CALC-MCNC: 142.4 MG/DL (ref 63–159)
LYMPHOCYTES # BLD AUTO: 2.8 K/UL (ref 1–4.8)
LYMPHOCYTES NFR BLD: 29.7 % (ref 18–48)
MCH RBC QN AUTO: 29.2 PG (ref 27–31)
MCHC RBC AUTO-ENTMCNC: 32.6 G/DL (ref 32–36)
MCV RBC AUTO: 90 FL (ref 82–98)
MONOCYTES # BLD AUTO: 0.8 K/UL (ref 0.3–1)
MONOCYTES NFR BLD: 8.7 % (ref 4–15)
NEUTROPHILS # BLD AUTO: 5.5 K/UL (ref 1.8–7.7)
NEUTROPHILS NFR BLD: 58.8 % (ref 38–73)
NONHDLC SERPL-MCNC: 178 MG/DL
NRBC BLD-RTO: 0 /100 WBC
PLATELET # BLD AUTO: 250 K/UL (ref 150–450)
PMV BLD AUTO: 10.8 FL (ref 9.2–12.9)
POTASSIUM SERPL-SCNC: 3.8 MMOL/L (ref 3.5–5.1)
PROT SERPL-MCNC: 7.5 G/DL (ref 6–8.4)
RBC # BLD AUTO: 5.14 M/UL (ref 4.6–6.2)
SODIUM SERPL-SCNC: 140 MMOL/L (ref 136–145)
TRIGL SERPL-MCNC: 178 MG/DL (ref 30–150)
TSH SERPL DL<=0.005 MIU/L-ACNC: 2.05 UIU/ML (ref 0.4–4)
WBC # BLD AUTO: 9.38 K/UL (ref 3.9–12.7)

## 2022-03-31 PROCEDURE — 1159F MED LIST DOCD IN RCRD: CPT | Mod: CPTII,S$GLB,, | Performed by: FAMILY MEDICINE

## 2022-03-31 PROCEDURE — 93010 EKG 12-LEAD: ICD-10-PCS | Mod: S$GLB,,, | Performed by: INTERNAL MEDICINE

## 2022-03-31 PROCEDURE — 99999 PR PBB SHADOW E&M-EST. PATIENT-LVL V: CPT | Mod: PBBFAC,,, | Performed by: FAMILY MEDICINE

## 2022-03-31 PROCEDURE — 93005 EKG 12-LEAD: ICD-10-PCS | Mod: S$GLB,,, | Performed by: FAMILY MEDICINE

## 2022-03-31 PROCEDURE — 84443 ASSAY THYROID STIM HORMONE: CPT | Performed by: FAMILY MEDICINE

## 2022-03-31 PROCEDURE — 93010 ELECTROCARDIOGRAM REPORT: CPT | Mod: S$GLB,,, | Performed by: INTERNAL MEDICINE

## 2022-03-31 PROCEDURE — 3008F BODY MASS INDEX DOCD: CPT | Mod: CPTII,S$GLB,, | Performed by: FAMILY MEDICINE

## 2022-03-31 PROCEDURE — 93005 ELECTROCARDIOGRAM TRACING: CPT | Mod: S$GLB,,, | Performed by: FAMILY MEDICINE

## 2022-03-31 PROCEDURE — 3075F SYST BP GE 130 - 139MM HG: CPT | Mod: CPTII,S$GLB,, | Performed by: FAMILY MEDICINE

## 2022-03-31 PROCEDURE — 80061 LIPID PANEL: CPT | Performed by: FAMILY MEDICINE

## 2022-03-31 PROCEDURE — 99999 PR PBB SHADOW E&M-EST. PATIENT-LVL V: ICD-10-PCS | Mod: PBBFAC,,, | Performed by: FAMILY MEDICINE

## 2022-03-31 PROCEDURE — 99214 OFFICE O/P EST MOD 30 MIN: CPT | Mod: S$GLB,,, | Performed by: FAMILY MEDICINE

## 2022-03-31 PROCEDURE — 1159F PR MEDICATION LIST DOCUMENTED IN MEDICAL RECORD: ICD-10-PCS | Mod: CPTII,S$GLB,, | Performed by: FAMILY MEDICINE

## 2022-03-31 PROCEDURE — 99214 PR OFFICE/OUTPT VISIT, EST, LEVL IV, 30-39 MIN: ICD-10-PCS | Mod: S$GLB,,, | Performed by: FAMILY MEDICINE

## 2022-03-31 PROCEDURE — 3008F PR BODY MASS INDEX (BMI) DOCUMENTED: ICD-10-PCS | Mod: CPTII,S$GLB,, | Performed by: FAMILY MEDICINE

## 2022-03-31 PROCEDURE — 84153 ASSAY OF PSA TOTAL: CPT | Performed by: FAMILY MEDICINE

## 2022-03-31 PROCEDURE — 85025 COMPLETE CBC W/AUTO DIFF WBC: CPT | Performed by: FAMILY MEDICINE

## 2022-03-31 PROCEDURE — 3079F PR MOST RECENT DIASTOLIC BLOOD PRESSURE 80-89 MM HG: ICD-10-PCS | Mod: CPTII,S$GLB,, | Performed by: FAMILY MEDICINE

## 2022-03-31 PROCEDURE — 3079F DIAST BP 80-89 MM HG: CPT | Mod: CPTII,S$GLB,, | Performed by: FAMILY MEDICINE

## 2022-03-31 PROCEDURE — 3075F PR MOST RECENT SYSTOLIC BLOOD PRESS GE 130-139MM HG: ICD-10-PCS | Mod: CPTII,S$GLB,, | Performed by: FAMILY MEDICINE

## 2022-03-31 PROCEDURE — 80053 COMPREHEN METABOLIC PANEL: CPT | Performed by: FAMILY MEDICINE

## 2022-03-31 PROCEDURE — 36415 COLL VENOUS BLD VENIPUNCTURE: CPT | Mod: PN | Performed by: FAMILY MEDICINE

## 2022-03-31 RX ORDER — ALPRAZOLAM 1 MG/1
1 TABLET ORAL NIGHTLY PRN
Qty: 30 TABLET | Refills: 5 | Status: SHIPPED | OUTPATIENT
Start: 2022-03-31 | End: 2023-03-06 | Stop reason: SDUPTHER

## 2022-04-01 NOTE — PROGRESS NOTES
Subjective:       Patient ID: Reagan Carbajal is a 61 y.o. male.    Chief Complaint: Follow-up  of insomnia chronic and chest pain recurrent but not presently  HPIsee above  Review of Systems   Constitutional: Negative.    HENT: Negative.    Eyes: Negative.    Cardiovascular: Positive for chest pain.   Gastrointestinal: Negative.    Endocrine: Negative.    Genitourinary: Negative.    Musculoskeletal: Negative.    Integumentary:  Negative.   Allergic/Immunologic: Negative.    Neurological: Negative.    Hematological: Negative.    Psychiatric/Behavioral: Positive for sleep disturbance.         Objective:      Physical Exam  Constitutional:       General: He is not in acute distress.     Appearance: He is obese. He is not ill-appearing, toxic-appearing or diaphoretic.   HENT:      Head: Normocephalic and atraumatic.      Right Ear: Tympanic membrane, ear canal and external ear normal.      Left Ear: Tympanic membrane, ear canal and external ear normal.      Nose: Nose normal. No congestion.      Mouth/Throat:      Mouth: Mucous membranes are moist.      Pharynx: Oropharynx is clear.   Eyes:      Extraocular Movements: Extraocular movements intact.      Conjunctiva/sclera: Conjunctivae normal.      Pupils: Pupils are equal, round, and reactive to light.   Cardiovascular:      Rate and Rhythm: Normal rate and regular rhythm.      Pulses: Normal pulses.      Heart sounds: Normal heart sounds.   Pulmonary:      Effort: Pulmonary effort is normal. No respiratory distress.      Breath sounds: Normal breath sounds. No rhonchi.   Abdominal:      General: Abdomen is flat. Bowel sounds are normal. There is no distension.      Palpations: Abdomen is soft. There is no mass.      Tenderness: There is no abdominal tenderness. There is no guarding.   Musculoskeletal:         General: Normal range of motion.      Cervical back: Normal range of motion and neck supple. No rigidity.   Lymphadenopathy:      Cervical: No cervical adenopathy.    Skin:     General: Skin is warm and dry.      Capillary Refill: Capillary refill takes less than 2 seconds.      Findings: No erythema or lesion.   Neurological:      General: No focal deficit present.      Mental Status: He is alert. Mental status is at baseline. He is disoriented.      Cranial Nerves: No cranial nerve deficit.      Sensory: No sensory deficit.      Motor: No weakness.      Coordination: Coordination normal.      Gait: Gait normal.      Deep Tendon Reflexes: Reflexes normal.   Psychiatric:         Mood and Affect: Mood normal.         Behavior: Behavior normal.         Thought Content: Thought content normal.         Judgment: Judgment normal.         Assessment:       Problem List Items Addressed This Visit    None     Visit Diagnoses     Other chest pain    -  Primary    Relevant Orders    IN OFFICE EKG 12-LEAD (to Muse) (Completed)    CBC Auto Differential (Completed)    TSH (Completed)    Comprehensive Metabolic Panel (Completed)    Lipid Panel (Completed)    Transient insomnia        Relevant Medications    ALPRAZolam (XANAX) 1 MG tablet    Screening for prostate cancer        Relevant Orders    PSA, Screening (Completed)          Plan:     1. Other chest pain  - IN OFFICE EKG 12-LEAD (to Muse)  - CBC Auto Differential; Future  - TSH; Future  - Comprehensive Metabolic Panel; Future  - Lipid Panel; Future    2. Transient insomnia  - ALPRAZolam (XANAX) 1 MG tablet; Take 1 tablet (1 mg total) by mouth nightly as needed.  Dispense: 30 tablet; Refill: 5    3. Screening for prostate cancer  - PSA, Screening; Future  Will contact patient with results of testing when available patient follow-up in 1 month for epp and. review of labs

## 2022-05-10 DIAGNOSIS — F51.02 TRANSIENT INSOMNIA: ICD-10-CM

## 2022-05-10 RX ORDER — ALPRAZOLAM 1 MG/1
TABLET ORAL
Qty: 30 TABLET | Refills: 0 | OUTPATIENT
Start: 2022-05-10

## 2022-05-10 NOTE — TELEPHONE ENCOUNTER
Spoke with Calista at [facility] pharmacy.  She does show prescription from 03/31/2022 with refills.  She said to disregard request.

## 2022-07-20 ENCOUNTER — TELEPHONE (OUTPATIENT)
Dept: PRIMARY CARE CLINIC | Facility: CLINIC | Age: 62
End: 2022-07-20
Payer: MEDICARE

## 2022-07-20 NOTE — TELEPHONE ENCOUNTER
----- Message from Emilia Gurrola sent at 7/20/2022 10:06 AM CDT -----  Contact: pt [phone]    Patient is looking to go to the [place] location since you are leaving. Wants to know if you are familiar with any of those providers and could you make a recommendation.     Please call and advise.    Thank You

## 2022-07-22 ENCOUNTER — OFFICE VISIT (OUTPATIENT)
Dept: FAMILY MEDICINE | Facility: CLINIC | Age: 62
End: 2022-07-22
Payer: MEDICARE

## 2022-07-22 VITALS
SYSTOLIC BLOOD PRESSURE: 148 MMHG | BODY MASS INDEX: 41.75 KG/M2 | TEMPERATURE: 98 F | HEART RATE: 73 BPM | OXYGEN SATURATION: 97 % | DIASTOLIC BLOOD PRESSURE: 92 MMHG | HEIGHT: 73 IN | WEIGHT: 315 LBS

## 2022-07-22 DIAGNOSIS — M10.031 IDIOPATHIC GOUT OF RIGHT WRIST, UNSPECIFIED CHRONICITY: Primary | ICD-10-CM

## 2022-07-22 PROCEDURE — 1159F MED LIST DOCD IN RCRD: CPT | Mod: CPTII,S$GLB,, | Performed by: STUDENT IN AN ORGANIZED HEALTH CARE EDUCATION/TRAINING PROGRAM

## 2022-07-22 PROCEDURE — 1159F PR MEDICATION LIST DOCUMENTED IN MEDICAL RECORD: ICD-10-PCS | Mod: CPTII,S$GLB,, | Performed by: STUDENT IN AN ORGANIZED HEALTH CARE EDUCATION/TRAINING PROGRAM

## 2022-07-22 PROCEDURE — 99213 OFFICE O/P EST LOW 20 MIN: CPT | Mod: 25,S$GLB,, | Performed by: STUDENT IN AN ORGANIZED HEALTH CARE EDUCATION/TRAINING PROGRAM

## 2022-07-22 PROCEDURE — 99213 PR OFFICE/OUTPT VISIT, EST, LEVL III, 20-29 MIN: ICD-10-PCS | Mod: 25,S$GLB,, | Performed by: STUDENT IN AN ORGANIZED HEALTH CARE EDUCATION/TRAINING PROGRAM

## 2022-07-22 PROCEDURE — 99999 PR PBB SHADOW E&M-EST. PATIENT-LVL III: CPT | Mod: PBBFAC,,, | Performed by: STUDENT IN AN ORGANIZED HEALTH CARE EDUCATION/TRAINING PROGRAM

## 2022-07-22 PROCEDURE — 20605 INTERMEDIATE JOINT ASPIRATION/INJECTION: ICD-10-PCS | Mod: RT,S$GLB,, | Performed by: STUDENT IN AN ORGANIZED HEALTH CARE EDUCATION/TRAINING PROGRAM

## 2022-07-22 PROCEDURE — 20605 DRAIN/INJ JOINT/BURSA W/O US: CPT | Mod: RT,S$GLB,, | Performed by: STUDENT IN AN ORGANIZED HEALTH CARE EDUCATION/TRAINING PROGRAM

## 2022-07-22 PROCEDURE — 99999 PR PBB SHADOW E&M-EST. PATIENT-LVL III: ICD-10-PCS | Mod: PBBFAC,,, | Performed by: STUDENT IN AN ORGANIZED HEALTH CARE EDUCATION/TRAINING PROGRAM

## 2022-07-22 PROCEDURE — 3008F BODY MASS INDEX DOCD: CPT | Mod: CPTII,S$GLB,, | Performed by: STUDENT IN AN ORGANIZED HEALTH CARE EDUCATION/TRAINING PROGRAM

## 2022-07-22 PROCEDURE — 3008F PR BODY MASS INDEX (BMI) DOCUMENTED: ICD-10-PCS | Mod: CPTII,S$GLB,, | Performed by: STUDENT IN AN ORGANIZED HEALTH CARE EDUCATION/TRAINING PROGRAM

## 2022-07-22 RX ORDER — COLCHICINE 0.6 MG/1
TABLET ORAL
Qty: 60 TABLET | Refills: 11 | Status: SHIPPED | OUTPATIENT
Start: 2022-07-22 | End: 2022-11-28

## 2022-07-22 RX ADMIN — TRIAMCINOLONE ACETONIDE 20 MG: 40 INJECTION, SUSPENSION INTRA-ARTICULAR; INTRAMUSCULAR at 10:07

## 2022-07-22 NOTE — PROGRESS NOTES
2022    Reagan Carbajal  [id number]    CC: gout    HPI    Right wrist pain   Present for the last week and still hurting a lot  Has been indocin but not working  Started in his foot and seemed to move to wrist   Is in so much pain that he would some sort of injection      Negative 10 point ROS outside of HPI    Social History     Socioeconomic History    Marital status:    Occupational History     Employer: [employer]   Tobacco Use    Smoking status: Former Smoker     Types: Cigarettes, Cigars     Quit date: 5/3/1993     Years since quittin.2    Smokeless tobacco: Never Used   Substance and Sexual Activity    Alcohol use: No     Alcohol/week: 0.0 standard drinks    Drug use: No    Sexual activity: Yes     Partners: Female     Birth control/protection: None           Current Outpatient Medications:     ALPRAZolam (XANAX) 1 MG tablet, Take 1 tablet (1 mg total) by mouth nightly as needed., Disp: 30 tablet, Rfl: 5    aspirin (ECOTRIN) 81 MG EC tablet, Take 81 mg by mouth once daily., Disp: , Rfl:     cetirizine (ZYRTEC) 10 MG tablet, Take 1 tablet (10 mg total) by mouth 2 (two) times daily. for 7 days, Disp: 90 tablet, Rfl: 3    chlorthalidone (HYGROTEN) 25 MG Tab, Take 1 tablet (25 mg total) by mouth once daily., Disp: 90 tablet, Rfl: 3    diclofenac (VOLTAREN) 50 MG EC tablet, Take 1 tablet (50 mg total) by mouth 2 (two) times daily., Disp: 40 tablet, Rfl: 1    EPINEPHrine 0.15 mg/0.15 mL AtIn, Inject 0.3 mLs (0.3 mg total) into the muscle as needed (Administer into outer aspect of thigh. May repeat one time in 15 minutes if symptoms persist)., Disp: 1 Device, Rfl: 0    famotidine (PEPCID) 40 MG tablet, Take 1 tablet (40 mg total) by mouth once daily., Disp: 180 tablet, Rfl: 3    hyoscyamine (ANASPAZ,LEVSIN) 0.125 mg Tab, Take 1 tablet (125 mcg total) by mouth every 6 (six) hours as needed., Disp: 40 tablet, Rfl: 2    lovastatin (MEVACOR) 40 MG tablet, Take 1 tablet by mouth  nightly, Disp: 90 tablet, Rfl: 3    mupirocin (BACTROBAN) 2 % ointment, Apply topically 3 (three) times daily., Disp: 30 g, Rfl: 1    ofloxacin (FLOXIN) 0.3 % otic solution, Place 5 drops into both ears 2 (two) times daily., Disp: 5 mL, Rfl: 1    potassium chloride SA (K-DUR,KLOR-CON) 20 MEQ tablet, Take 1 tablet (20 mEq total) by mouth 2 (two) times daily., Disp: 180 tablet, Rfl: 3    verapamiL (VERELAN) 360 MG C24P, Take 1 capsule (360 mg total) by mouth once daily., Disp: 90 capsule, Rfl: 3    colchicine (COLCRYS) 0.6 mg tablet, Take 1 tab twice daily until symptoms resolve, Disp: 60 tablet, Rfl: 11    furosemide (LASIX) 40 MG tablet, Take 1 tablet (40 mg total) by mouth daily as needed. (Patient not taking: Reported on 7/22/2022), Disp: 30 tablet, Rfl: 11    metoprolol tartrate (LOPRESSOR) 25 MG tablet, Take 1 tablet (25 mg total) by mouth 2 (two) times daily as needed (palpitations). (Patient not taking: Reported on 7/22/2022), Disp: 180 tablet, Rfl: 3    Current Facility-Administered Medications:     triamcinolone acetonide injection 20 mg, 20 mg, Intra-articular, 1 time in Clinic/HOD, Celeste Art MD      Physical Exam  Vitals:    07/22/22 1027   BP: (!) 148/92   Pulse: 73   Temp: 98.1 °F (36.7 °C)     Gen: well appearing, NAD  Resp: non labored breathing, no crackles, no wheezes, CTAB  CV: RRR no murmur, gallops, rubs, no LE edema  Right wrist: generalized edema, very tender to touch, limited ROM 2/2 pain     1. Idiopathic gout of right wrist, unspecified chronicity  - colchicine (COLCRYS) 0.6 mg tablet; Take 1 tab twice daily until symptoms resolve  Dispense: 60 tablet; Refill: 11  - Intermediate Joint Aspiration/Injection  - triamcinolone acetonide injection 20 mg    RTC as needed     Celeste Art MD  Family Medicine

## 2022-08-02 RX ORDER — TRIAMCINOLONE ACETONIDE 40 MG/ML
20 INJECTION, SUSPENSION INTRA-ARTICULAR; INTRAMUSCULAR
Status: DISCONTINUED | OUTPATIENT
Start: 2022-07-22 | End: 2022-08-03

## 2022-08-02 NOTE — PROCEDURES
Intermediate Joint Aspiration/Injection    Date/Time: 7/22/2022 10:20 AM  Performed by: Celeste Art MD  Authorized by: Celeste Art MD     Consent Done?:  Yes (Verbal)  Indications:  Pain    Location:  Wrist  Prep: Patient was prepped and draped in usual sterile fashion    Needle size:  22 G  Approach:  Anteromedial  Medications:  20 mg triamcinolone acetonide 40 mg/mL

## 2022-08-03 RX ORDER — TRIAMCINOLONE ACETONIDE 40 MG/ML
20 INJECTION, SUSPENSION INTRA-ARTICULAR; INTRAMUSCULAR
Status: DISCONTINUED | OUTPATIENT
Start: 2022-07-22 | End: 2022-08-03

## 2022-08-03 RX ORDER — BUPIVACAINE HYDROCHLORIDE 5 MG/ML
4 INJECTION, SOLUTION PERINEURAL
Status: DISCONTINUED | OUTPATIENT
Start: 2022-07-22 | End: 2022-08-03

## 2022-08-03 NOTE — PROCEDURES
Intermediate Joint Aspiration/Injection    Date/Time: 7/22/2022 10:20 AM  Performed by: Celeste Art MD  Authorized by: Celeste Art MD     Medications:  20 mg triamcinolone acetonide 40 mg/mL

## 2022-08-20 DIAGNOSIS — I10 HYPERTENSION, BENIGN: ICD-10-CM

## 2022-08-20 NOTE — TELEPHONE ENCOUNTER
Refill Routing Note   Medication(s) are not appropriate for processing by [facility] for the following reason(s):      - Non-participating provider    [facility] action(s):  Route          Medication reconciliation completed: No     Appointments  past 12m or future 3m with PCP    Date Provider   Last Visit   3/31/2022 Carmen Cosby MD   Next Visit   Visit date not found Carmen Cosby MD   ED visits in past 90 days: 0        Note composed:4:47 PM 08/20/2022

## 2022-08-20 NOTE — TELEPHONE ENCOUNTER
No new care gaps identified.  [facility] Embedded Care Gaps. Reference number: [id number]. 8/20/2022   4:21:27 PM CDT

## 2022-08-22 RX ORDER — CHLORTHALIDONE 25 MG/1
TABLET ORAL
Qty: 90 TABLET | Refills: 0 | Status: SHIPPED | OUTPATIENT
Start: 2022-08-22 | End: 2023-02-03 | Stop reason: SDUPTHER

## 2022-10-25 ENCOUNTER — OFFICE VISIT (OUTPATIENT)
Dept: FAMILY MEDICINE | Facility: CLINIC | Age: 62
End: 2022-10-25
Payer: MEDICARE

## 2022-10-25 VITALS
HEART RATE: 87 BPM | DIASTOLIC BLOOD PRESSURE: 86 MMHG | WEIGHT: 315 LBS | HEIGHT: 72 IN | OXYGEN SATURATION: 95 % | TEMPERATURE: 98 F | BODY MASS INDEX: 42.66 KG/M2 | SYSTOLIC BLOOD PRESSURE: 162 MMHG

## 2022-10-25 DIAGNOSIS — G47.00 INSOMNIA, UNSPECIFIED TYPE: ICD-10-CM

## 2022-10-25 DIAGNOSIS — I10 ESSENTIAL HYPERTENSION: Primary | Chronic | ICD-10-CM

## 2022-10-25 DIAGNOSIS — M1A.0710 IDIOPATHIC CHRONIC GOUT OF RIGHT FOOT WITHOUT TOPHUS: Chronic | ICD-10-CM

## 2022-10-25 DIAGNOSIS — Z79.899 ENCOUNTER FOR LONG-TERM CURRENT USE OF MEDICATION: ICD-10-CM

## 2022-10-25 DIAGNOSIS — E66.01 OBESITY, MORBID, BMI 40.0-49.9: Chronic | ICD-10-CM

## 2022-10-25 DIAGNOSIS — M65.311 TRIGGER FINGER OF RIGHT THUMB: ICD-10-CM

## 2022-10-25 PROBLEM — R00.2 PALPITATIONS: Status: RESOLVED | Noted: 2021-02-05 | Resolved: 2022-10-25

## 2022-10-25 PROCEDURE — 99215 PR OFFICE/OUTPT VISIT, EST, LEVL V, 40-54 MIN: ICD-10-PCS | Mod: 25,S$GLB,, | Performed by: INTERNAL MEDICINE

## 2022-10-25 PROCEDURE — 3077F PR MOST RECENT SYSTOLIC BLOOD PRESSURE >= 140 MM HG: ICD-10-PCS | Mod: CPTII,S$GLB,, | Performed by: INTERNAL MEDICINE

## 2022-10-25 PROCEDURE — 90686 FLU VACCINE (QUAD) GREATER THAN OR EQUAL TO 3YO PRESERVATIVE FREE IM: ICD-10-PCS | Mod: S$GLB,,, | Performed by: INTERNAL MEDICINE

## 2022-10-25 PROCEDURE — G0008 ADMIN INFLUENZA VIRUS VAC: HCPCS | Mod: S$GLB,,, | Performed by: INTERNAL MEDICINE

## 2022-10-25 PROCEDURE — 99215 OFFICE O/P EST HI 40 MIN: CPT | Mod: 25,S$GLB,, | Performed by: INTERNAL MEDICINE

## 2022-10-25 PROCEDURE — 99499 RISK ADDL DX/OHS AUDIT: ICD-10-PCS | Mod: S$GLB,,, | Performed by: INTERNAL MEDICINE

## 2022-10-25 PROCEDURE — 1160F RVW MEDS BY RX/DR IN RCRD: CPT | Mod: CPTII,S$GLB,, | Performed by: INTERNAL MEDICINE

## 2022-10-25 PROCEDURE — 99499 UNLISTED E&M SERVICE: CPT | Mod: S$GLB,,, | Performed by: INTERNAL MEDICINE

## 2022-10-25 PROCEDURE — G0008 FLU VACCINE (QUAD) GREATER THAN OR EQUAL TO 3YO PRESERVATIVE FREE IM: ICD-10-PCS | Mod: S$GLB,,, | Performed by: INTERNAL MEDICINE

## 2022-10-25 PROCEDURE — 1159F MED LIST DOCD IN RCRD: CPT | Mod: CPTII,S$GLB,, | Performed by: INTERNAL MEDICINE

## 2022-10-25 PROCEDURE — 99999 PR PBB SHADOW E&M-EST. PATIENT-LVL V: CPT | Mod: PBBFAC,,, | Performed by: INTERNAL MEDICINE

## 2022-10-25 PROCEDURE — 1159F PR MEDICATION LIST DOCUMENTED IN MEDICAL RECORD: ICD-10-PCS | Mod: CPTII,S$GLB,, | Performed by: INTERNAL MEDICINE

## 2022-10-25 PROCEDURE — 3079F PR MOST RECENT DIASTOLIC BLOOD PRESSURE 80-89 MM HG: ICD-10-PCS | Mod: CPTII,S$GLB,, | Performed by: INTERNAL MEDICINE

## 2022-10-25 PROCEDURE — 3077F SYST BP >= 140 MM HG: CPT | Mod: CPTII,S$GLB,, | Performed by: INTERNAL MEDICINE

## 2022-10-25 PROCEDURE — 99999 PR PBB SHADOW E&M-EST. PATIENT-LVL V: ICD-10-PCS | Mod: PBBFAC,,, | Performed by: INTERNAL MEDICINE

## 2022-10-25 PROCEDURE — 1160F PR REVIEW ALL MEDS BY PRESCRIBER/CLIN PHARMACIST DOCUMENTED: ICD-10-PCS | Mod: CPTII,S$GLB,, | Performed by: INTERNAL MEDICINE

## 2022-10-25 PROCEDURE — 90686 IIV4 VACC NO PRSV 0.5 ML IM: CPT | Mod: S$GLB,,, | Performed by: INTERNAL MEDICINE

## 2022-10-25 PROCEDURE — 3079F DIAST BP 80-89 MM HG: CPT | Mod: CPTII,S$GLB,, | Performed by: INTERNAL MEDICINE

## 2022-10-25 RX ORDER — INDOMETHACIN 50 MG/1
50 CAPSULE ORAL 3 TIMES DAILY PRN
Qty: 90 CAPSULE | Refills: 0 | Status: SHIPPED | OUTPATIENT
Start: 2022-10-25 | End: 2023-02-10 | Stop reason: SDUPTHER

## 2022-10-25 RX ORDER — VERAPAMIL HYDROCHLORIDE 360 MG/1
360 CAPSULE, DELAYED RELEASE PELLETS ORAL DAILY
Qty: 90 CAPSULE | Refills: 0 | Status: SHIPPED | OUTPATIENT
Start: 2022-10-25 | End: 2022-11-01

## 2022-10-25 NOTE — ASSESSMENT & PLAN NOTE
The patient's BMI has been recorded in the chart. The patient has been provided educational materials regarding the benefits of attaining and maintaining a normal weight. We will continue to address and follow this issue during follow up visits.

## 2022-10-25 NOTE — Clinical Note
Hey,  This azael was requesting to come see you.  He his wife and son were seeing a PCP at [facility] who retired.  Needed to get back on his verapamil.    Sounds like he needs a good comprehensive approach to his conditions instead of a pill for everything type thing.  He even stated he prefers not to take meds daily unless he has to.    I think supporting him in a weight loss journey will be the biggest bang for his verma as he was pretty upset to see his weight today and be told Xanax was too dangerous with untreated SHARAN.  Nice azael just needs a good reset.    Sincerely, Maged

## 2022-10-25 NOTE — PROGRESS NOTES
Assessment & Plan (all problems are new to me)  Problem List Items Addressed This Visit          Cardiac/Vascular    Essential hypertension - Primary (Chronic)    Current Assessment & Plan     Restart verapamil.  Noted to have side effects listed for ACE, ARB, and triamterene.  The patient is asked to make an attempt to improve diet and exercise patterns to aid in medical management of this problem.     Short follow up         Relevant Medications    verapamiL (VERELAN) 360 MG C24P    Other Relevant Orders    CBC Auto Differential    Comprehensive Metabolic Panel    Lipid Panel       Endocrine    Obesity, morbid, BMI 40.0-49.9 (Chronic)    Current Assessment & Plan     The patient's BMI has been recorded in the chart. The patient has been provided educational materials regarding the benefits of attaining and maintaining a normal weight. We will continue to address and follow this issue during follow up visits.             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  I have discussed the common side effects of this(these) medication(s) and black box warnings (if applicable) with the patient and answered all of the questions they had at the time of this visit regarding this(these) medication(s).     Discussed today about needs for dietary adherence for this and his other medical conditions.  We also discussed that he continues to have flares, allopurinol should be considered for Ppx.  He prefers to avoid starting another daily medication at this time.          Relevant Medications    indomethacin (INDOCIN) 50 MG capsule    Other Relevant Orders    URIC ACID     Other Visit Diagnoses       Encounter for long-term current use of medication    -  Screen A1c.  Reports increases sweets in his diet.     Relevant Orders    Hemoglobin A1C    Insomnia, unspecified type    -  Pt reports h/o insomnia for which he is requesting Xanax.  Also  "reports history of sleep apnea and brief chart review shows non-adherent to CPAP.  I discussed with him my concerns of Xanax use due to, but not limited to, dependence and respiratory suppression in the setting of untreated SHARAN.  Discussed other safer options such as trazodone and mirtazapine but I suspect that he would benefit from a comprehensive approach to SHARAN, obesity.           Trigger finger right thumb - counseled.  Does not feel he needs to see Ortho at this time.      I spent >50% of this 45 minute encounter counseling the patient on diagnoses, risk factors, and treatment.    Health Maintenance reviewed, flu today.  C-scope discussion deferred in favor of discussion above conditions.     Follow-up: Follow up in about 4 weeks (around 11/22/2022) for establish care and BP follow up.  Requesting to see Dr. Campbell.  ______________________________________________________________________    Chief Complaint  Chief Complaint   Patient presents with    Gout       HPI  Reagan Carbajal is a 62 y.o. male with multiple medical diagnoses as listed in the medical history and problem list that presents for gout follow up.  Pt is new to me but is known to this clinic/department with their last appointment being 7/22/2022.      Reports no active flare.  Recently had another flare in his right foot that resolved with indocin.  Having some left thumb pain and "snapping."  This also resolved with indocin.  Still snapping.  He states that he is not adherent to a low uric acid diet. Noted to be on thiazide.     Has been off of his verapamil.  Noted to have gained weight which he is upset about.  Highest weight on file.  No CP, SOB, palpitations. Reports he is very active but no specific exercise regimen.  Does lots of work around the house and with his boat.  Retired.  Does state that he has been eating a lot more sweets and is back on soft drinks.      He states his PCP retired.  Would like to move his PC to this location as it is " close to his home.  Needs refills.  Requesting Xanax for stress but then states for sleep.  Reports intermittent insomnia but also reported history of SHARAN; not using his CPAP.  I cannot find a PSG during chart review today. He states that he doesn't use the Xanax regularly but his  showed monthly refills for most, but not all, of .            PAST MEDICAL HISTORY:  Past Medical History:   Diagnosis Date    Anxiety     Colon polyp     Colon polyp     CPAP (continuous positive airway pressure) dependence     does not use    GERD (gastroesophageal reflux disease)     Gout 2018    HTN (hypertension) 2012    Hyperlipidemia     Insomnia with sleep apnea     Obesity, morbid, BMI 40.0-49.9 2014       PAST SURGICAL HISTORY:  Past Surgical History:   Procedure Laterality Date    COLONOSCOPY N/A 5/3/2016    Procedure: COLONOSCOPY;  Surgeon: Chance Glynn MD;  Location: [facility] ([address] [place] [address]);  Service: Endoscopy;  Laterality: N/A;    COLONOSCOPY W/ POLYPECTOMY         SOCIAL HISTORY:  Social History     Socioeconomic History    Marital status:    Occupational History     Employer: [employer]   Tobacco Use    Smoking status: Former     Types: Cigarettes, Cigars     Quit date: 5/3/1993     Years since quittin.4    Smokeless tobacco: Never   Substance and Sexual Activity    Alcohol use: No     Alcohol/week: 0.0 standard drinks    Drug use: No    Sexual activity: Yes     Partners: Female     Birth control/protection: None       FAMILY HISTORY:  Family History   Problem Relation Age of Onset    Heart disease Mother     Hyperlipidemia Mother     Hypertension Mother     Lymphoma Mother     Hypertension Father     Hepatitis Brother        ALLERGIES AND MEDICATIONS: updated and reviewed.  Review of patient's allergies indicates:   Allergen Reactions    Diovan [valsartan]      Ringing in the ears    Lisinopril Swelling    Tramadol      Nausea      Triamterene      Current Outpatient Medications    Medication Sig Dispense Refill    ALPRAZolam (XANAX) 1 MG tablet Take 1 tablet (1 mg total) by mouth nightly as needed. 30 tablet 5    aspirin (ECOTRIN) 81 MG EC tablet Take 81 mg by mouth once daily.      chlorthalidone (HYGROTEN) 25 MG Tab Take 1 tablet by mouth once daily 90 tablet 0    lovastatin (MEVACOR) 40 MG tablet Take 1 tablet by mouth nightly 90 tablet 3    cetirizine (ZYRTEC) 10 MG tablet Take 1 tablet (10 mg total) by mouth 2 (two) times daily. for 7 days 90 tablet 3    colchicine (COLCRYS) 0.6 mg tablet Take 1 tab twice daily until symptoms resolve (Patient not taking: Reported on 10/25/2022) 60 tablet 11    diclofenac (VOLTAREN) 50 MG EC tablet Take 1 tablet (50 mg total) by mouth 2 (two) times daily. (Patient not taking: Reported on 10/25/2022) 40 tablet 1    EPINEPHrine 0.15 mg/0.15 mL AtIn Inject 0.3 mLs (0.3 mg total) into the muscle as needed (Administer into outer aspect of thigh. May repeat one time in 15 minutes if symptoms persist). 1 Device 0    famotidine (PEPCID) 40 MG tablet Take 1 tablet (40 mg total) by mouth once daily. 180 tablet 3    hyoscyamine (ANASPAZ,LEVSIN) 0.125 mg Tab Take 1 tablet (125 mcg total) by mouth every 6 (six) hours as needed. 40 tablet 2    indomethacin (INDOCIN) 50 MG capsule Take 1 capsule (50 mg total) by mouth 3 (three) times daily as needed (gout flares.  Stop as soon as flare improved). 90 capsule 0    metoprolol tartrate (LOPRESSOR) 25 MG tablet Take 1 tablet (25 mg total) by mouth 2 (two) times daily as needed (palpitations). (Patient not taking: Reported on 7/22/2022) 180 tablet 3    mupirocin (BACTROBAN) 2 % ointment Apply topically 3 (three) times daily. (Patient not taking: Reported on 10/25/2022) 30 g 1    ofloxacin (FLOXIN) 0.3 % otic solution Place 5 drops into both ears 2 (two) times daily. (Patient not taking: Reported on 10/25/2022) 5 mL 1    potassium chloride SA (K-DUR,KLOR-CON) 20 MEQ tablet Take 1 tablet (20 mEq total) by mouth 2 (two) times  daily. (Patient not taking: Reported on 10/25/2022) 180 tablet 3    verapamiL (VERELAN) 360 MG C24P Take 1 capsule (360 mg total) by mouth once daily. 90 capsule 0     No current facility-administered medications for this visit.         ROS  Review of Systems   Constitutional:  Negative for chills, fever and unexpected weight change.   HENT:  Negative for congestion, dental problem, ear pain, hearing loss, rhinorrhea, sore throat and trouble swallowing.    Eyes:  Negative for discharge, redness and visual disturbance.   Respiratory:  Negative for cough, chest tightness, shortness of breath and wheezing.    Cardiovascular:  Negative for chest pain, palpitations and leg swelling.   Gastrointestinal:  Negative for abdominal pain, constipation, diarrhea, nausea and vomiting.   Genitourinary:  Negative for decreased urine volume, dysuria and hematuria.   Musculoskeletal:  Positive for arthralgias and joint swelling. Negative for myalgias.   Neurological:  Negative for dizziness, weakness, light-headedness and headaches.   Psychiatric/Behavioral:  Positive for sleep disturbance. Negative for decreased concentration, dysphoric mood and suicidal ideas.        Physical Exam  Vitals:    10/25/22 1355 10/25/22 1449   BP: (!) 160/90 (!) 162/86   Pulse: 87    Temp: 98 °F (36.7 °C)    SpO2: 95%    Weight: (!) 160.3 kg (353 lb 8.1 oz)    Height: 6' (1.829 m)     Body mass index is 47.94 kg/m².  Weight: (!) 160.3 kg (353 lb 8.1 oz)   Height: 6' (182.9 cm)   Physical Exam  Constitutional:       General: He is not in acute distress.     Appearance: He is well-developed.   HENT:      Head: Normocephalic and atraumatic.   Eyes:      General: Lids are normal. No scleral icterus.     Conjunctiva/sclera: Conjunctivae normal.      Pupils: Pupils are equal, round, and reactive to light.   Neck:      Thyroid: No thyromegaly.      Vascular: No carotid bruit or JVD.   Cardiovascular:      Rate and Rhythm: Normal rate and regular rhythm.       Heart sounds: Normal heart sounds. No murmur heard.    No friction rub. No S3 or S4 sounds.   Pulmonary:      Effort: Pulmonary effort is normal.      Breath sounds: Normal breath sounds. No wheezing, rhonchi or rales.   Abdominal:      General: Bowel sounds are normal. There is no distension.      Palpations: Abdomen is soft.      Tenderness: There is no abdominal tenderness.   Musculoskeletal:         General: No tenderness.      Cervical back: Full passive range of motion without pain and neck supple.   Skin:     General: Skin is warm and dry.      Findings: No rash.   Neurological:      Mental Status: He is alert and oriented to person, place, and time.   Psychiatric:         Speech: Speech normal.         Behavior: Behavior normal.         Thought Content: Thought content normal.           Health Maintenance         Date Due Completion Date    Shingles Vaccine (1 of 2) Never done ---    Colorectal Cancer Screening 05/03/2021 5/3/2016    COVID-19 Vaccine (3 - Booster for [place] series) 01/07/2022 11/12/2021    TETANUS VACCINE 08/02/2026 8/2/2016    Lipid Panel 03/31/2027 3/31/2022

## 2022-10-25 NOTE — ASSESSMENT & PLAN NOTE
Restart verapamil.  Noted to have side effects listed for ACE, ARB, and triamterene.  The patient is asked to make an attempt to improve diet and exercise patterns to aid in medical management of this problem.     Short follow up

## 2022-10-25 NOTE — ASSESSMENT & PLAN NOTE
Ok for PRN indocin.  I have discussed the common side effects of this(these) medication(s) and black box warnings (if applicable) with the patient and answered all of the questions they had at the time of this visit regarding this(these) medication(s).     Discussed today about needs for dietary adherence for this and his other medical conditions.  We also discussed that he continues to have flares, allopurinol should be considered for Ppx.  He prefers to avoid starting another daily medication at this time.

## 2022-10-31 ENCOUNTER — TELEPHONE (OUTPATIENT)
Dept: FAMILY MEDICINE | Facility: CLINIC | Age: 62
End: 2022-10-31
Payer: MEDICARE

## 2022-10-31 DIAGNOSIS — I10 ESSENTIAL HYPERTENSION: Chronic | ICD-10-CM

## 2022-10-31 NOTE — TELEPHONE ENCOUNTER
----- Message from Alana Stroud sent at 10/31/2022  9:56 AM CDT -----  Type: Patient Call Back    Who called:self    What is the request in detail:Pt's insurance does not cover verapamiL. Pharmacy told pt amlodopine is covered by [employer]. Pt would like medication changed.     Can the clinic reply by [facility]?    Would the patient rather a call back or a response via [facility]? call    Best call back number:[phone] (home)       Additional Information:[facility] [place] - [address] [place] [address]   Phone:  [phone]  Fax:  [phone]

## 2022-11-01 RX ORDER — AMLODIPINE BESYLATE 10 MG/1
10 TABLET ORAL DAILY
Qty: 90 TABLET | Refills: 0 | Status: SHIPPED | OUTPATIENT
Start: 2022-11-01 | End: 2023-02-03 | Stop reason: SDUPTHER

## 2022-11-01 NOTE — ASSESSMENT & PLAN NOTE
Insurance refused to cover verapamil.  No clear indication for rate control.  Change to amlodipine.  BB PRN rate control.

## 2022-11-01 NOTE — TELEPHONE ENCOUNTER
I don't see a clear indication for why he would need to take verapamil.      Amlodipine sent in.      Keep OV with Dr. Campbell at the end of the month.

## 2022-11-02 ENCOUNTER — TELEPHONE (OUTPATIENT)
Dept: FAMILY MEDICINE | Facility: CLINIC | Age: 62
End: 2022-11-02
Payer: MEDICARE

## 2022-11-02 NOTE — TELEPHONE ENCOUNTER
----- Message from Ruthie Segovia sent at 11/2/2022  7:47 AM CDT -----  Type:  Pharmacy Calling to Clarify an RX    Name of Caller: Melinda    Pharmacy Name: [facility]    Prescription Name: amLODIPine (NORVASC) 10 MG tablet, verapamil    What do they need to clarify?  Pt is on amlodipine and was recently prescribed verapamil and they are both calcium channel blockers. Pharmacy is asking if the pt is to continue taking both Rx or cease taking the amlodipine. Please call    Can you be contacted via [facility]? no    Best Call Back Number: [phone]    Additional Information:

## 2022-11-02 NOTE — TELEPHONE ENCOUNTER
Pharmacist, Melinda, notified that the prescription for verapamil has been cancelled.  Verbalized understanding.

## 2022-11-10 ENCOUNTER — OFFICE VISIT (OUTPATIENT)
Dept: URGENT CARE | Facility: CLINIC | Age: 62
End: 2022-11-10
Payer: MEDICARE

## 2022-11-10 VITALS
SYSTOLIC BLOOD PRESSURE: 179 MMHG | OXYGEN SATURATION: 96 % | TEMPERATURE: 98 F | WEIGHT: 315 LBS | HEIGHT: 72 IN | DIASTOLIC BLOOD PRESSURE: 79 MMHG | BODY MASS INDEX: 42.66 KG/M2 | HEART RATE: 74 BPM | RESPIRATION RATE: 16 BRPM

## 2022-11-10 DIAGNOSIS — M10.9 GOUT, ARTHRITIS: Primary | ICD-10-CM

## 2022-11-10 DIAGNOSIS — M65.311 TRIGGER FINGER OF RIGHT THUMB: ICD-10-CM

## 2022-11-10 DIAGNOSIS — M25.531 RIGHT WRIST PAIN: ICD-10-CM

## 2022-11-10 LAB — URATE SERPL-MCNC: 8.3 MG/DL (ref 3.4–7)

## 2022-11-10 PROCEDURE — 73110 XR WRIST COMPLETE 3 VIEWS RIGHT: ICD-10-PCS | Mod: RT,S$GLB,, | Performed by: RADIOLOGY

## 2022-11-10 PROCEDURE — 96372 PR INJECTION,THERAP/PROPH/DIAG2ST, IM OR SUBCUT: ICD-10-PCS | Mod: S$GLB,,,

## 2022-11-10 PROCEDURE — 3078F PR MOST RECENT DIASTOLIC BLOOD PRESSURE < 80 MM HG: ICD-10-PCS | Mod: CPTII,S$GLB,,

## 2022-11-10 PROCEDURE — 1160F PR REVIEW ALL MEDS BY PRESCRIBER/CLIN PHARMACIST DOCUMENTED: ICD-10-PCS | Mod: CPTII,S$GLB,,

## 2022-11-10 PROCEDURE — 73110 X-RAY EXAM OF WRIST: CPT | Mod: RT,S$GLB,, | Performed by: RADIOLOGY

## 2022-11-10 PROCEDURE — 96372 THER/PROPH/DIAG INJ SC/IM: CPT | Mod: S$GLB,,,

## 2022-11-10 PROCEDURE — 3077F PR MOST RECENT SYSTOLIC BLOOD PRESSURE >= 140 MM HG: ICD-10-PCS | Mod: CPTII,S$GLB,,

## 2022-11-10 PROCEDURE — 99204 OFFICE O/P NEW MOD 45 MIN: CPT | Mod: 25,S$GLB,,

## 2022-11-10 PROCEDURE — 99204 PR OFFICE/OUTPT VISIT, NEW, LEVL IV, 45-59 MIN: ICD-10-PCS | Mod: 25,S$GLB,,

## 2022-11-10 PROCEDURE — 1159F MED LIST DOCD IN RCRD: CPT | Mod: CPTII,S$GLB,,

## 2022-11-10 PROCEDURE — 3077F SYST BP >= 140 MM HG: CPT | Mod: CPTII,S$GLB,,

## 2022-11-10 PROCEDURE — 3078F DIAST BP <80 MM HG: CPT | Mod: CPTII,S$GLB,,

## 2022-11-10 PROCEDURE — 3008F PR BODY MASS INDEX (BMI) DOCUMENTED: ICD-10-PCS | Mod: CPTII,S$GLB,,

## 2022-11-10 PROCEDURE — 1159F PR MEDICATION LIST DOCUMENTED IN MEDICAL RECORD: ICD-10-PCS | Mod: CPTII,S$GLB,,

## 2022-11-10 PROCEDURE — 3008F BODY MASS INDEX DOCD: CPT | Mod: CPTII,S$GLB,,

## 2022-11-10 PROCEDURE — 1160F RVW MEDS BY RX/DR IN RCRD: CPT | Mod: CPTII,S$GLB,,

## 2022-11-10 PROCEDURE — 36415 COLL VENOUS BLD VENIPUNCTURE: CPT

## 2022-11-10 PROCEDURE — 84550 ASSAY OF BLOOD/URIC ACID: CPT

## 2022-11-10 RX ORDER — COLCHICINE 0.6 MG/1
0.6 TABLET ORAL DAILY
Qty: 30 TABLET | Refills: 11 | Status: SHIPPED | OUTPATIENT
Start: 2022-11-10 | End: 2022-11-28

## 2022-11-10 RX ORDER — BETAMETHASONE SODIUM PHOSPHATE AND BETAMETHASONE ACETATE 3; 3 MG/ML; MG/ML
6 INJECTION, SUSPENSION INTRA-ARTICULAR; INTRALESIONAL; INTRAMUSCULAR; SOFT TISSUE
Status: COMPLETED | OUTPATIENT
Start: 2022-11-10 | End: 2022-11-10

## 2022-11-10 RX ORDER — ALLOPURINOL 100 MG/1
100 TABLET ORAL DAILY
Qty: 30 TABLET | Refills: 0 | Status: SHIPPED | OUTPATIENT
Start: 2022-11-10 | End: 2022-11-28 | Stop reason: SDUPTHER

## 2022-11-10 RX ADMIN — BETAMETHASONE SODIUM PHOSPHATE AND BETAMETHASONE ACETATE 6 MG: 3; 3 INJECTION, SUSPENSION INTRA-ARTICULAR; INTRALESIONAL; INTRAMUSCULAR; SOFT TISSUE at 09:11

## 2022-11-10 NOTE — PROGRESS NOTES
Subjective:       Patient ID: Reagan Carbajal is a 62 y.o. male.    Vitals:  height is 6' (1.829 m) and weight is 160.1 kg (353 lb) (abnormal). His temperature is 98.2 °F (36.8 °C). His blood pressure is 179/79 (abnormal) and his pulse is 74. His respiration is 16 and oxygen saturation is 96%.     Chief Complaint: Wrist Pain    62-year-old male with past medical history of hypertension, hyperlipidemia, gout presents with complaints of right wrist pain.  Patient states that a few days ago the door some right wrist began having excruciating pain, swelling and warmness to touch.  Patient has had occult flare-up in his wrist before.  Last gout flare up was July 2022 for which he was treated with a joint steroid injection which did help relieve the pain.  Patient also complains of right thumb catching and popping.  Patient has history of right thumb trigger finger diagnosed by his primary care doctor.  Patient has been taking indomethacin for the gout flare up without any significant relief.  He denies any numbness or tingling in the fingers or wrist.  No injury.    Wrist Pain   The pain is present in the right wrist and right fingers. This is a recurrent problem. The current episode started in the past 7 days. The problem occurs constantly. The problem has been unchanged. The quality of the pain is described as sharp. The pain is at a severity of 9/10. Associated symptoms include joint locking, joint swelling, a limited range of motion and stiffness. Pertinent negatives include no fever, inability to bear weight, itching, numbness or tingling. The symptoms are aggravated by activity. He has tried chondroitin for the symptoms. The treatment provided no relief. His past medical history is significant for gout. There is no history of diabetes, osteoarthritis or rheumatoid arthritis.     Constitution: Negative for fever.   Musculoskeletal:  Positive for pain, joint pain, joint swelling, abnormal ROM of joint and gout.    Neurological:  Negative for numbness.     Objective:      Physical Exam   Constitutional: He is oriented to person, place, and time. He appears well-developed. He is cooperative.  Non-toxic appearance. He does not appear ill. No distress.   HENT:   Head: Normocephalic and atraumatic.   Ears:   Right Ear: Hearing, tympanic membrane and ear canal normal.   Left Ear: Hearing, tympanic membrane and ear canal normal.   Nose: Nose normal. No mucosal edema or nasal deformity. No epistaxis. Right sinus exhibits no maxillary sinus tenderness and no frontal sinus tenderness. Left sinus exhibits no maxillary sinus tenderness and no frontal sinus tenderness.   Mouth/Throat: Uvula is midline, oropharynx is clear and moist and mucous membranes are normal. No trismus in the jaw. Normal dentition. No uvula swelling.   Eyes: Lids are normal.   Neck: Trachea normal and phonation normal. Neck supple.   Cardiovascular: Normal rate, regular rhythm, normal heart sounds and normal pulses.   Pulmonary/Chest: Effort normal and breath sounds normal. No respiratory distress.   Abdominal: Normal appearance and bowel sounds are normal. Soft.   Musculoskeletal:         General: No deformity.      Right wrist: He exhibits tenderness and swelling.      Comments: Dorsal wrist swelling, tenderness to light palpation and warmth.     Clicking, popping of the DIP joint of the right thumb with pain with extension and flexion of joint.    Neurological: He is alert and oriented to person, place, and time. He exhibits normal muscle tone. Coordination normal.   Skin: Skin is warm, dry, intact, not diaphoretic and not pale.   Psychiatric: His speech is normal and behavior is normal. Judgment and thought content normal.   Nursing note and vitals reviewed.      XR WRIST COMPLETE 3 VIEWS RIGHT    Result Date: 11/10/2022  EXAMINATION: XR WRIST COMPLETE 3 VIEWS RIGHT CLINICAL HISTORY: Pain in right wrist TECHNIQUE: PA, lateral, and oblique views of the right  wrist were performed. COMPARISON: None FINDINGS: No acute fracture or dislocation. Alignment is normal. Joint spaces are preserved.     No acute osseous abnormality. Electronically signed by: Garcia Rodgers Date:    11/10/2022 Time:    09:38     Assessment:       1. Gout, arthritis    2. Right wrist pain    3. Trigger finger of right thumb          Plan:       Patient given steroid shot today for gout flare-up.  He has not taken his blood pressure medication, advised patient to take his blood pressure medication as soon as he leaves here.  The also began starting him on allopurinol and colchicine for treatment of gout flare-up.  Patient was advised to follow-up with his PCP in 2-3 weeks for monitoring of uric acid level.  We got a baseline uric acid level today as we are starting on allopurinol 100 mg daily.  PCP will make any adjustments or titration for allopurinol.  Patient states that his primary care doctor did discuss allopurinol with him once before but he did not want to take any daily medications and now due to the gout flare up she would like to discuss chronic medication for gout.  We discussed allopurinol is a urate reducing medication and titrations are needed based on the uric acid level. Patient will have repeat uric acid level in November prior to his PCP appointment on 11/28/2022. Kidney function reviewed on CMP of 3/31/2022, patient has normal kidney function.     We also discussed referral to orthopedics for evaluation and management of trigger finger as symptoms are not improving with his joint steroid injections. Patient verbalized understanding for the the need to see an orthopedic specialist for more management option discussions for trigger finger.   Gout, arthritis  -     betamethasone acetate-betamethasone sodium phosphate injection 6 mg  -     colchicine (COLCRYS) 0.6 mg tablet; Take 1 tablet (0.6 mg total) by mouth once daily.  Dispense: 30 tablet; Refill: 11  -     allopurinoL (ZYLOPRIM)  100 MG tablet; Take 1 tablet (100 mg total) by mouth once daily.  Dispense: 30 tablet; Refill: 0  -     URIC ACID; Future; Expected date: 11/10/2022    Right wrist pain  -     XR WRIST COMPLETE 3 VIEWS RIGHT; Future; Expected date: 11/10/2022    Trigger finger of right thumb  -     Ambulatory referral/consult to Orthopedics

## 2022-11-22 ENCOUNTER — LAB VISIT (OUTPATIENT)
Dept: LAB | Facility: HOSPITAL | Age: 62
End: 2022-11-22
Attending: INTERNAL MEDICINE
Payer: MEDICARE

## 2022-11-22 DIAGNOSIS — Z79.899 ENCOUNTER FOR LONG-TERM CURRENT USE OF MEDICATION: ICD-10-CM

## 2022-11-22 DIAGNOSIS — M1A.0710 IDIOPATHIC CHRONIC GOUT OF RIGHT FOOT WITHOUT TOPHUS: Chronic | ICD-10-CM

## 2022-11-22 DIAGNOSIS — I10 ESSENTIAL HYPERTENSION: Chronic | ICD-10-CM

## 2022-11-22 LAB
ALBUMIN SERPL BCP-MCNC: 3.8 G/DL (ref 3.5–5.2)
ALP SERPL-CCNC: 77 U/L (ref 55–135)
ALT SERPL W/O P-5'-P-CCNC: 20 U/L (ref 10–44)
ANION GAP SERPL CALC-SCNC: 8 MMOL/L (ref 8–16)
AST SERPL-CCNC: 16 U/L (ref 10–40)
BASOPHILS # BLD AUTO: 0.04 K/UL (ref 0–0.2)
BASOPHILS NFR BLD: 0.4 % (ref 0–1.9)
BILIRUB SERPL-MCNC: 0.7 MG/DL (ref 0.1–1)
BUN SERPL-MCNC: 14 MG/DL (ref 8–23)
CALCIUM SERPL-MCNC: 9.6 MG/DL (ref 8.7–10.5)
CHLORIDE SERPL-SCNC: 104 MMOL/L (ref 95–110)
CHOLEST SERPL-MCNC: 206 MG/DL (ref 120–199)
CHOLEST/HDLC SERPL: 6.1 {RATIO} (ref 2–5)
CO2 SERPL-SCNC: 29 MMOL/L (ref 23–29)
CREAT SERPL-MCNC: 0.9 MG/DL (ref 0.5–1.4)
DIFFERENTIAL METHOD: NORMAL
EOSINOPHIL # BLD AUTO: 0.2 K/UL (ref 0–0.5)
EOSINOPHIL NFR BLD: 2.5 % (ref 0–8)
ERYTHROCYTE [DISTWIDTH] IN BLOOD BY AUTOMATED COUNT: 14.1 % (ref 11.5–14.5)
EST. GFR  (NO RACE VARIABLE): >60 ML/MIN/1.73 M^2
ESTIMATED AVG GLUCOSE: 123 MG/DL (ref 68–131)
GLUCOSE SERPL-MCNC: 105 MG/DL (ref 70–110)
HBA1C MFR BLD: 5.9 % (ref 4–5.6)
HCT VFR BLD AUTO: 45.6 % (ref 40–54)
HDLC SERPL-MCNC: 34 MG/DL (ref 40–75)
HDLC SERPL: 16.5 % (ref 20–50)
HGB BLD-MCNC: 14.7 G/DL (ref 14–18)
IMM GRANULOCYTES # BLD AUTO: 0.02 K/UL (ref 0–0.04)
IMM GRANULOCYTES NFR BLD AUTO: 0.2 % (ref 0–0.5)
LDLC SERPL CALC-MCNC: 144 MG/DL (ref 63–159)
LYMPHOCYTES # BLD AUTO: 2.4 K/UL (ref 1–4.8)
LYMPHOCYTES NFR BLD: 26.5 % (ref 18–48)
MCH RBC QN AUTO: 30.1 PG (ref 27–31)
MCHC RBC AUTO-ENTMCNC: 32.2 G/DL (ref 32–36)
MCV RBC AUTO: 93 FL (ref 82–98)
MONOCYTES # BLD AUTO: 1 K/UL (ref 0.3–1)
MONOCYTES NFR BLD: 10.8 % (ref 4–15)
NEUTROPHILS # BLD AUTO: 5.4 K/UL (ref 1.8–7.7)
NEUTROPHILS NFR BLD: 59.6 % (ref 38–73)
NONHDLC SERPL-MCNC: 172 MG/DL
NRBC BLD-RTO: 0 /100 WBC
PLATELET # BLD AUTO: 243 K/UL (ref 150–450)
PMV BLD AUTO: 10.8 FL (ref 9.2–12.9)
POTASSIUM SERPL-SCNC: 4.5 MMOL/L (ref 3.5–5.1)
PROT SERPL-MCNC: 7.3 G/DL (ref 6–8.4)
RBC # BLD AUTO: 4.89 M/UL (ref 4.6–6.2)
SODIUM SERPL-SCNC: 141 MMOL/L (ref 136–145)
TRIGL SERPL-MCNC: 140 MG/DL (ref 30–150)
URATE SERPL-MCNC: 7.3 MG/DL (ref 3.4–7)
WBC # BLD AUTO: 9.11 K/UL (ref 3.9–12.7)

## 2022-11-22 PROCEDURE — 36415 COLL VENOUS BLD VENIPUNCTURE: CPT | Mod: PO | Performed by: INTERNAL MEDICINE

## 2022-11-22 PROCEDURE — 84550 ASSAY OF BLOOD/URIC ACID: CPT | Performed by: INTERNAL MEDICINE

## 2022-11-22 PROCEDURE — 85025 COMPLETE CBC W/AUTO DIFF WBC: CPT | Performed by: INTERNAL MEDICINE

## 2022-11-22 PROCEDURE — 80053 COMPREHEN METABOLIC PANEL: CPT | Performed by: INTERNAL MEDICINE

## 2022-11-22 PROCEDURE — 83036 HEMOGLOBIN GLYCOSYLATED A1C: CPT | Performed by: INTERNAL MEDICINE

## 2022-11-22 PROCEDURE — 80061 LIPID PANEL: CPT | Performed by: INTERNAL MEDICINE

## 2022-11-28 ENCOUNTER — OFFICE VISIT (OUTPATIENT)
Dept: FAMILY MEDICINE | Facility: CLINIC | Age: 62
End: 2022-11-28
Payer: MEDICARE

## 2022-11-28 VITALS
SYSTOLIC BLOOD PRESSURE: 150 MMHG | OXYGEN SATURATION: 95 % | TEMPERATURE: 98 F | DIASTOLIC BLOOD PRESSURE: 72 MMHG | WEIGHT: 315 LBS | HEART RATE: 83 BPM | HEIGHT: 72 IN | BODY MASS INDEX: 42.66 KG/M2

## 2022-11-28 DIAGNOSIS — I10 ESSENTIAL HYPERTENSION: ICD-10-CM

## 2022-11-28 DIAGNOSIS — E66.01 CLASS 3 SEVERE OBESITY DUE TO EXCESS CALORIES WITH SERIOUS COMORBIDITY AND BODY MASS INDEX (BMI) OF 45.0 TO 49.9 IN ADULT: ICD-10-CM

## 2022-11-28 DIAGNOSIS — G47.33 OSA (OBSTRUCTIVE SLEEP APNEA): ICD-10-CM

## 2022-11-28 DIAGNOSIS — E78.5 HYPERLIPIDEMIA, UNSPECIFIED HYPERLIPIDEMIA TYPE: ICD-10-CM

## 2022-11-28 DIAGNOSIS — M10.9 ACUTE GOUT OF RIGHT WRIST, UNSPECIFIED CAUSE: ICD-10-CM

## 2022-11-28 DIAGNOSIS — Z00.00 ENCOUNTER FOR MEDICAL EXAMINATION TO ESTABLISH CARE: Primary | ICD-10-CM

## 2022-11-28 DIAGNOSIS — R73.03 PRE-DIABETES: ICD-10-CM

## 2022-11-28 PROCEDURE — 1159F MED LIST DOCD IN RCRD: CPT | Mod: CPTII,S$GLB,, | Performed by: FAMILY MEDICINE

## 2022-11-28 PROCEDURE — 3078F DIAST BP <80 MM HG: CPT | Mod: CPTII,S$GLB,, | Performed by: FAMILY MEDICINE

## 2022-11-28 PROCEDURE — 1159F PR MEDICATION LIST DOCUMENTED IN MEDICAL RECORD: ICD-10-PCS | Mod: CPTII,S$GLB,, | Performed by: FAMILY MEDICINE

## 2022-11-28 PROCEDURE — 3078F PR MOST RECENT DIASTOLIC BLOOD PRESSURE < 80 MM HG: ICD-10-PCS | Mod: CPTII,S$GLB,, | Performed by: FAMILY MEDICINE

## 2022-11-28 PROCEDURE — 3008F BODY MASS INDEX DOCD: CPT | Mod: CPTII,S$GLB,, | Performed by: FAMILY MEDICINE

## 2022-11-28 PROCEDURE — 99214 PR OFFICE/OUTPT VISIT, EST, LEVL IV, 30-39 MIN: ICD-10-PCS | Mod: S$GLB,,, | Performed by: FAMILY MEDICINE

## 2022-11-28 PROCEDURE — 3008F PR BODY MASS INDEX (BMI) DOCUMENTED: ICD-10-PCS | Mod: CPTII,S$GLB,, | Performed by: FAMILY MEDICINE

## 2022-11-28 PROCEDURE — 1160F PR REVIEW ALL MEDS BY PRESCRIBER/CLIN PHARMACIST DOCUMENTED: ICD-10-PCS | Mod: CPTII,S$GLB,, | Performed by: FAMILY MEDICINE

## 2022-11-28 PROCEDURE — 99999 PR PBB SHADOW E&M-EST. PATIENT-LVL V: CPT | Mod: PBBFAC,,, | Performed by: FAMILY MEDICINE

## 2022-11-28 PROCEDURE — 1160F RVW MEDS BY RX/DR IN RCRD: CPT | Mod: CPTII,S$GLB,, | Performed by: FAMILY MEDICINE

## 2022-11-28 PROCEDURE — 3044F HG A1C LEVEL LT 7.0%: CPT | Mod: CPTII,S$GLB,, | Performed by: FAMILY MEDICINE

## 2022-11-28 PROCEDURE — 99214 OFFICE O/P EST MOD 30 MIN: CPT | Mod: S$GLB,,, | Performed by: FAMILY MEDICINE

## 2022-11-28 PROCEDURE — 3077F PR MOST RECENT SYSTOLIC BLOOD PRESSURE >= 140 MM HG: ICD-10-PCS | Mod: CPTII,S$GLB,, | Performed by: FAMILY MEDICINE

## 2022-11-28 PROCEDURE — 99999 PR PBB SHADOW E&M-EST. PATIENT-LVL V: ICD-10-PCS | Mod: PBBFAC,,, | Performed by: FAMILY MEDICINE

## 2022-11-28 PROCEDURE — 3044F PR MOST RECENT HEMOGLOBIN A1C LEVEL <7.0%: ICD-10-PCS | Mod: CPTII,S$GLB,, | Performed by: FAMILY MEDICINE

## 2022-11-28 PROCEDURE — 3077F SYST BP >= 140 MM HG: CPT | Mod: CPTII,S$GLB,, | Performed by: FAMILY MEDICINE

## 2022-11-28 RX ORDER — COLCHICINE 0.6 MG/1
CAPSULE ORAL
Qty: 30 CAPSULE | Refills: 1 | Status: SHIPPED | OUTPATIENT
Start: 2022-11-28 | End: 2022-11-29 | Stop reason: SDUPTHER

## 2022-11-28 RX ORDER — ALLOPURINOL 200 MG/1
200 TABLET ORAL DAILY
Qty: 90 TABLET | Refills: 1 | Status: SHIPPED | OUTPATIENT
Start: 2022-11-28 | End: 2022-12-06 | Stop reason: SDUPTHER

## 2022-11-28 RX ORDER — LOVASTATIN 40 MG/1
40 TABLET ORAL NIGHTLY
Qty: 90 TABLET | Refills: 1 | Status: SHIPPED | OUTPATIENT
Start: 2022-11-28 | End: 2023-02-03 | Stop reason: SDUPTHER

## 2022-11-28 NOTE — PROGRESS NOTES
Assessment & Plan  Encounter for medical examination to establish care    Acute gout of right wrist, unspecified cause  -     colchicine (MITIGARE) 0.6 mg Cap; Take 1.2 mg at the onset of a gout flare, then 0.6 mg 1 hour thereafter.  Dispense: 30 capsule; Refill: 1  -     allopurinoL 200 mg Tab; Take 200 mg by mouth once daily.  Dispense: 90 tablet; Refill: 1  -     Basic Metabolic Panel; Future; Expected date: 11/28/2022  -     Uric Acid; Future; Expected date: 11/28/2022    Counseled on low purine diet to prevent gout flares. Discussed the possibility that chlorthalidone could be causing gout flares but this is most likely diet related. Increase allopurinol to 200 mg daily. New brand name for colchicine sent to pharmacy for acute flare.     SHARAN (obstructive sleep apnea)  -     Ambulatory referral/consult to Sleep Disorders; Future; Expected date: 12/05/2022    Advised to establish care with Sleep Medicine for further mgmt.     Essential hypertension  BP elevated today. Patient did not take medication prior to appointment - resume usual therapy: take chlorthalidone in the morning and amlodipine at night.     Hyperlipidemia, unspecified hyperlipidemia type  -     lovastatin (MEVACOR) 40 MG tablet; Take 1 tablet (40 mg total) by mouth nightly. Take 1 tablet by mouth nightly  Dispense: 90 tablet; Refill: 1  -     Lipid Panel; Future; Expected date: 11/28/2022    Pre-diabetes  -     Basic Metabolic Panel; Future; Expected date: 11/28/2022  -     Hemoglobin A1C; Future; Expected date: 11/28/2022    Labs reviewed. Discussed the necessary dietary and lifestyle changes needed to improve lipid panel & A1c. Repeat labs in 3 mo.    Class 3 severe obesity due to excess calories with serious comorbidity and body mass index (BMI) of 45.0 to 49.9 in adult  See above.     Follow-up: Follow up in about 3 months (around 2/28/2023).  ______________________________________________________________________    Chief Complaint  Chief  Complaint   Patient presents with    Annual Exam       HPI  Reagan Carbajal is a 62 y.o. male with medical diagnoses as listed in the medical history and problem list that presents to the office to establish care. He has a history of recurrent gout of the right wrist for which he was prescribed colchicine at Urgent Care but this was not covered by his insurance. Taking allopurinol 100 mg daily. He asks if chlorthalidone could be causing the gout flares. He has a history of SHARAN and his CPAP was recalled, so he has been using his friend's CPAP.     Most recent pertinent workup:     Last CBC Results:   Lab Results   Component Value Date    WBC 9.11 11/22/2022    HGB 14.7 11/22/2022    HCT 45.6 11/22/2022     11/22/2022       Last CMP Results  Lab Results   Component Value Date     11/22/2022    K 4.5 11/22/2022     11/22/2022    CO2 29 11/22/2022    BUN 14 11/22/2022    CREATININE 0.9 11/22/2022    CALCIUM 9.6 11/22/2022    ALBUMIN 3.8 11/22/2022    AST 16 11/22/2022    ALT 20 11/22/2022       Last Lipids  Lab Results   Component Value Date    CHOL 206 (H) 11/22/2022    TRIG 140 11/22/2022    HDL 34 (L) 11/22/2022    LDLCALC 144.0 11/22/2022   The 10-year ASCVD risk score (Catrina JACKSON, et al., 2019) is: 22.1%    Values used to calculate the score:      Age: 62 years      Sex: Male      Is Non- : Yes      Diabetic: No      Tobacco smoker: No      Systolic Blood Pressure: 150 mmHg      Is BP treated: Yes      HDL Cholesterol: 34 mg/dL      Total Cholesterol: 206 mg/dL      Last A1C  Lab Results   Component Value Date    HGBA1C 5.9 (H) 11/22/2022         Health Maintenance         Date Due Completion Date    Shingles Vaccine (1 of 2) Never done ---    Colorectal Cancer Screening 05/03/2021 5/3/2016    COVID-19 Vaccine (3 - Booster for [place] series) 01/07/2022 11/12/2021    Hemoglobin A1c (Diabetic Prevention Screening) 11/22/2025 11/22/2022    TETANUS VACCINE 08/02/2026 8/2/2016     Lipid Panel 2027              PAST MEDICAL HISTORY:  Past Medical History:   Diagnosis Date    Anxiety     Colon polyp     Colon polyp     CPAP (continuous positive airway pressure) dependence     does not use    GERD (gastroesophageal reflux disease)     Gout 2018    HTN (hypertension) 2012    Hyperlipidemia     Insomnia with sleep apnea     Obesity, morbid, BMI 40.0-49.9 2014       PAST SURGICAL HISTORY:  Past Surgical History:   Procedure Laterality Date    COLONOSCOPY N/A 5/3/2016    Procedure: COLONOSCOPY;  Surgeon: Chance Glynn MD;  Location: [facility] ([address] [place] [address]);  Service: Endoscopy;  Laterality: N/A;    COLONOSCOPY W/ POLYPECTOMY         SOCIAL HISTORY:  Social History     Socioeconomic History    Marital status:    Occupational History     Employer: [employer]   Tobacco Use    Smoking status: Former     Types: Cigarettes, Cigars     Quit date: 5/3/1993     Years since quittin.6    Smokeless tobacco: Never   Substance and Sexual Activity    Alcohol use: No     Alcohol/week: 0.0 standard drinks    Drug use: No    Sexual activity: Yes     Partners: Female     Birth control/protection: None       FAMILY HISTORY:  Family History   Problem Relation Age of Onset    Heart disease Mother     Hyperlipidemia Mother     Hypertension Mother     Lymphoma Mother     Hypertension Father     Hepatitis Brother        ALLERGIES AND MEDICATIONS: updated and reviewed.  Review of patient's allergies indicates:   Allergen Reactions    Diovan [valsartan]      Ringing in the ears    Lisinopril Swelling    Tramadol      Nausea      Triamterene      Current Outpatient Medications   Medication Sig Dispense Refill    ALPRAZolam (XANAX) 1 MG tablet Take 1 tablet (1 mg total) by mouth nightly as needed. 30 tablet 5    amLODIPine (NORVASC) 10 MG tablet Take 1 tablet (10 mg total) by mouth once daily. 90 tablet 0    aspirin (ECOTRIN) 81 MG EC tablet Take 81 mg by mouth once daily.       cetirizine (ZYRTEC) 10 MG tablet Take 1 tablet (10 mg total) by mouth 2 (two) times daily. for 7 days 90 tablet 3    chlorthalidone (HYGROTEN) 25 MG Tab Take 1 tablet by mouth once daily 90 tablet 0    diclofenac (VOLTAREN) 50 MG EC tablet Take 1 tablet (50 mg total) by mouth 2 (two) times daily. 40 tablet 1    EPINEPHrine 0.15 mg/0.15 mL AtIn Inject 0.3 mLs (0.3 mg total) into the muscle as needed (Administer into outer aspect of thigh. May repeat one time in 15 minutes if symptoms persist). 1 Device 0    famotidine (PEPCID) 40 MG tablet Take 1 tablet (40 mg total) by mouth once daily. 180 tablet 3    hyoscyamine (ANASPAZ,LEVSIN) 0.125 mg Tab Take 1 tablet (125 mcg total) by mouth every 6 (six) hours as needed. 40 tablet 2    indomethacin (INDOCIN) 50 MG capsule Take 1 capsule (50 mg total) by mouth 3 (three) times daily as needed (gout flares.  Stop as soon as flare improved). 90 capsule 0    allopurinoL 200 mg Tab Take 200 mg by mouth once daily. 90 tablet 1    colchicine (MITIGARE) 0.6 mg Cap Take 1.2 mg po at the onset of a gout flare, then 0.6 mg po 1 hour thereafter for max dose of 1.8 mg a day. 30 capsule 1    lovastatin (MEVACOR) 40 MG tablet Take 1 tablet (40 mg total) by mouth nightly. Take 1 tablet by mouth nightly 90 tablet 1     No current facility-administered medications for this visit.         ROS  Review of Systems   Constitutional:  Negative for activity change, fever and unexpected weight change.   HENT:  Negative for congestion and sore throat.    Eyes:  Negative for photophobia and visual disturbance.   Respiratory:  Negative for cough and shortness of breath.    Cardiovascular:  Negative for chest pain and leg swelling.   Gastrointestinal:  Negative for abdominal pain, constipation, diarrhea, nausea and vomiting.   Endocrine: Negative for polydipsia, polyphagia and polyuria.   Genitourinary:  Negative for dysuria and urgency.   Musculoskeletal:  Positive for arthralgias and joint swelling.    Skin:  Negative for color change.   Neurological:  Negative for dizziness, weakness and headaches.   Psychiatric/Behavioral:  Positive for sleep disturbance. Negative for dysphoric mood. The patient is not nervous/anxious.          Physical Exam  Vitals:    11/28/22 1409   BP: (!) 150/72   BP Location: Right arm   Patient Position: Sitting   BP Method: Large (Manual)   Pulse: 83   Temp: 98.1 °F (36.7 °C)   TempSrc: Oral   SpO2: 95%   Weight: (!) 157.1 kg (346 lb 3.7 oz)   Height: 6' (1.829 m)    Body mass index is 46.96 kg/m².  Weight: (!) 157.1 kg (346 lb 3.7 oz)   Height: 6' (182.9 cm)   Physical Exam  Constitutional:       General: He is not in acute distress.     Appearance: He is obese.   HENT:      Head: Normocephalic and atraumatic.   Neck:      Thyroid: No thyromegaly.      Vascular: No carotid bruit.   Cardiovascular:      Rate and Rhythm: Normal rate and regular rhythm.      Pulses: Normal pulses.      Heart sounds: Normal heart sounds.   Pulmonary:      Effort: Pulmonary effort is normal. No respiratory distress.      Breath sounds: Normal breath sounds.   Musculoskeletal:      Cervical back: Neck supple.      Comments: Swelling and tenderness of the right wrist     Lymphadenopathy:      Cervical: No cervical adenopathy.   Skin:     General: Skin is warm and dry.      Findings: No rash.   Neurological:      General: No focal deficit present.      Mental Status: He is alert and oriented to person, place, and time.   Psychiatric:         Mood and Affect: Mood normal.         Behavior: Behavior normal.         Thought Content: Thought content normal.

## 2022-11-28 NOTE — PATIENT INSTRUCTIONS
Take amlodipine at night.  Take chlorthalidone (water pill) in the morning.  Take allopurinol 200 mg daily to prevent gout flares.

## 2022-11-29 DIAGNOSIS — M10.9 GOUT, ARTHRITIS: ICD-10-CM

## 2022-11-29 RX ORDER — COLCHICINE 0.6 MG/1
CAPSULE ORAL
Qty: 30 CAPSULE | Refills: 1 | Status: SHIPPED | OUTPATIENT
Start: 2022-11-29

## 2022-11-29 NOTE — TELEPHONE ENCOUNTER
----- Message from Leidy Vanegas sent at 11/28/2022  4:25 PM CST -----  Type:  Pharmacy Calling to Clarify an RX    Name of Caller:     Pharmacy Name:   [facility] [place]  [address]  [address]  [place] [address]  Phone: [phone] Fax: [phone]        Prescription Name: colchicine (MITIGARE) 0.6 mg Cap    What do they need to clarify? Sig: Take 1.2 mg at the onset of a gout flare, then 0.6 mg 1 hour thereafter.  Sent to pharmacy as: colchicine (MITIGARE) 0.6 mg Cap    What is the patient maximum dosage     Can you be contacted via [facility]?    Best Call Back Number:     Additional Information:

## 2022-11-29 NOTE — TELEPHONE ENCOUNTER
No new care gaps identified.  [facility] Embedded Care Gaps. Reference number: [id number]. 11/29/2022   7:22:00 AM CST

## 2022-12-06 DIAGNOSIS — M10.9 ACUTE GOUT OF RIGHT WRIST, UNSPECIFIED CAUSE: ICD-10-CM

## 2022-12-06 RX ORDER — ALLOPURINOL 100 MG/1
200 TABLET ORAL DAILY
Qty: 180 TABLET | Refills: 3 | Status: SHIPPED | OUTPATIENT
Start: 2022-12-06 | End: 2023-02-10 | Stop reason: SDUPTHER

## 2022-12-12 ENCOUNTER — CLINICAL SUPPORT (OUTPATIENT)
Dept: FAMILY MEDICINE | Facility: CLINIC | Age: 62
End: 2022-12-12
Payer: MEDICARE

## 2022-12-12 VITALS — DIASTOLIC BLOOD PRESSURE: 72 MMHG | OXYGEN SATURATION: 97 % | HEART RATE: 79 BPM | SYSTOLIC BLOOD PRESSURE: 132 MMHG

## 2022-12-12 DIAGNOSIS — I10 ESSENTIAL HYPERTENSION: Primary | ICD-10-CM

## 2022-12-12 PROCEDURE — 99999 PR PBB SHADOW E&M-EST. PATIENT-LVL II: CPT | Mod: PBBFAC,,,

## 2022-12-12 PROCEDURE — 99999 PR PBB SHADOW E&M-EST. PATIENT-LVL II: ICD-10-PCS | Mod: PBBFAC,,,

## 2022-12-12 NOTE — PROGRESS NOTES
Reagan Carbajal 62 y.o. male is here today for Blood Pressure check.   History of HTN yes.    Review of patient's allergies indicates:   Allergen Reactions    Diovan [valsartan]      Ringing in the ears    Lisinopril Swelling    Tramadol      Nausea      Triamterene      Creatinine   Date Value Ref Range Status   11/22/2022 0.9 0.5 - 1.4 mg/dL Final     Sodium   Date Value Ref Range Status   11/22/2022 141 136 - 145 mmol/L Final     Potassium   Date Value Ref Range Status   11/22/2022 4.5 3.5 - 5.1 mmol/L Final   ]  Patient verifies taking blood pressure medications on a regular basis at the same time of the day.     Current Outpatient Medications:     allopurinoL (ZYLOPRIM) 100 MG tablet, Take 2 tablets (200 mg total) by mouth once daily., Disp: 180 tablet, Rfl: 3    ALPRAZolam (XANAX) 1 MG tablet, Take 1 tablet (1 mg total) by mouth nightly as needed., Disp: 30 tablet, Rfl: 5    amLODIPine (NORVASC) 10 MG tablet, Take 1 tablet (10 mg total) by mouth once daily., Disp: 90 tablet, Rfl: 0    aspirin (ECOTRIN) 81 MG EC tablet, Take 81 mg by mouth once daily., Disp: , Rfl:     cetirizine (ZYRTEC) 10 MG tablet, Take 1 tablet (10 mg total) by mouth 2 (two) times daily. for 7 days, Disp: 90 tablet, Rfl: 3    chlorthalidone (HYGROTEN) 25 MG Tab, Take 1 tablet by mouth once daily, Disp: 90 tablet, Rfl: 0    colchicine (MITIGARE) 0.6 mg Cap, Take 1.2 mg po at the onset of a gout flare, then 0.6 mg po 1 hour thereafter for max dose of 1.8 mg a day., Disp: 30 capsule, Rfl: 1    diclofenac (VOLTAREN) 50 MG EC tablet, Take 1 tablet (50 mg total) by mouth 2 (two) times daily., Disp: 40 tablet, Rfl: 1    EPINEPHrine 0.15 mg/0.15 mL AtIn, Inject 0.3 mLs (0.3 mg total) into the muscle as needed (Administer into outer aspect of thigh. May repeat one time in 15 minutes if symptoms persist)., Disp: 1 Device, Rfl: 0    famotidine (PEPCID) 40 MG tablet, Take 1 tablet (40 mg total) by mouth once daily., Disp: 180 tablet, Rfl: 3     hyoscyamine (ANASPAZ,LEVSIN) 0.125 mg Tab, Take 1 tablet (125 mcg total) by mouth every 6 (six) hours as needed., Disp: 40 tablet, Rfl: 2    indomethacin (INDOCIN) 50 MG capsule, Take 1 capsule (50 mg total) by mouth 3 (three) times daily as needed (gout flares.  Stop as soon as flare improved)., Disp: 90 capsule, Rfl: 0    lovastatin (MEVACOR) 40 MG tablet, Take 1 tablet (40 mg total) by mouth nightly. Take 1 tablet by mouth nightly, Disp: 90 tablet, Rfl: 1  Does patient have record of home blood pressure readings no.   Last dose of blood pressure medication was taken at 8am.  Patient is asymptomatic.   Complains of none    Vitals:    12/12/22 1054   BP: 132/72   BP Location: Left arm   Patient Position: Sitting   BP Method: Large (Manual)   Pulse: 79   SpO2: 97%         Dr. Campbell informed of nurse visit.

## 2023-01-09 ENCOUNTER — TELEPHONE (OUTPATIENT)
Dept: FAMILY MEDICINE | Facility: CLINIC | Age: 63
End: 2023-01-09
Payer: MEDICARE

## 2023-01-09 DIAGNOSIS — M10.9 ACUTE GOUT OF RIGHT WRIST, UNSPECIFIED CAUSE: ICD-10-CM

## 2023-01-09 DIAGNOSIS — M10.9 ACUTE GOUT OF LEFT WRIST, UNSPECIFIED CAUSE: Primary | ICD-10-CM

## 2023-01-09 RX ORDER — PREDNISONE 20 MG/1
20 TABLET ORAL 2 TIMES DAILY
Qty: 10 TABLET | Refills: 0 | Status: SHIPPED | OUTPATIENT
Start: 2023-01-09 | End: 2023-01-14

## 2023-01-09 NOTE — TELEPHONE ENCOUNTER
Pt returned call to clinic. Pt states the gout in his left wrist has been unbearable for the past 3 days especially at night. Pt states he is taking all three gout meds and they are providing some relief but not enough. Pt states he has took steroids before in the past for this with good results. Pt informed that PCP will be notified of concern.

## 2023-01-09 NOTE — TELEPHONE ENCOUNTER
Sent in prednisone for the gout flare. I am unable to do steroid injections so he will need an Orthopedics referral if the oral steroid does not resolve it.

## 2023-01-09 NOTE — TELEPHONE ENCOUNTER
----- Message from Keena Martinez sent at 1/9/2023 12:13 PM CST -----  Regarding: medication request  Name of Who is Calling: Reagan           What is the request in detail: Patient is requesting a call back to get a steroid injection because his hands are swollen.           Can the clinic reply by [facility]: No           What Number to Call Back if not in [facility]: [phone]

## 2023-02-03 DIAGNOSIS — M10.9 ACUTE GOUT OF LEFT WRIST, UNSPECIFIED CAUSE: ICD-10-CM

## 2023-02-03 DIAGNOSIS — E78.5 HYPERLIPIDEMIA, UNSPECIFIED HYPERLIPIDEMIA TYPE: ICD-10-CM

## 2023-02-03 DIAGNOSIS — M1A.0710 IDIOPATHIC CHRONIC GOUT OF RIGHT FOOT WITHOUT TOPHUS: Chronic | ICD-10-CM

## 2023-02-03 DIAGNOSIS — I10 HYPERTENSION, BENIGN: ICD-10-CM

## 2023-02-03 DIAGNOSIS — I10 ESSENTIAL HYPERTENSION: Chronic | ICD-10-CM

## 2023-02-03 RX ORDER — INFLUENZA VIRUS VACCINE 15; 15; 15; 15 UG/.5ML; UG/.5ML; UG/.5ML; UG/.5ML
SUSPENSION INTRAMUSCULAR
COMMUNITY
Start: 2022-10-25

## 2023-02-03 RX ORDER — INDOMETHACIN 50 MG/1
50 CAPSULE ORAL 3 TIMES DAILY PRN
Qty: 90 CAPSULE | Refills: 0 | OUTPATIENT
Start: 2023-02-03

## 2023-02-03 RX ORDER — PREDNISONE 20 MG/1
20 TABLET ORAL 2 TIMES DAILY
Qty: 10 TABLET | Refills: 0 | OUTPATIENT
Start: 2023-02-03 | End: 2023-02-08

## 2023-02-03 RX ORDER — AMLODIPINE BESYLATE 10 MG/1
10 TABLET ORAL DAILY
Qty: 90 TABLET | Refills: 3 | Status: SHIPPED | OUTPATIENT
Start: 2023-02-03 | End: 2024-02-16 | Stop reason: SDUPTHER

## 2023-02-03 RX ORDER — BETAMETHASONE SODIUM PHOSPHATE AND BETAMETHASONE ACETATE 3; 3 MG/ML; MG/ML
INJECTION, SUSPENSION INTRA-ARTICULAR; INTRALESIONAL; INTRAMUSCULAR; SOFT TISSUE
COMMUNITY
Start: 2022-11-10

## 2023-02-03 RX ORDER — CHLORTHALIDONE 25 MG/1
25 TABLET ORAL DAILY
Qty: 90 TABLET | Refills: 3 | Status: SHIPPED | OUTPATIENT
Start: 2023-02-03

## 2023-02-03 RX ORDER — LOVASTATIN 40 MG/1
40 TABLET ORAL NIGHTLY
Qty: 90 TABLET | Refills: 3 | Status: SHIPPED | OUTPATIENT
Start: 2023-02-03 | End: 2023-02-10 | Stop reason: SDUPTHER

## 2023-02-03 NOTE — TELEPHONE ENCOUNTER
No new care gaps identified.  [facility] Embedded Care Gaps. Reference number: [id number]. 2/03/2023   1:37:25 PM CST

## 2023-02-07 DIAGNOSIS — Z00.00 ENCOUNTER FOR MEDICARE ANNUAL WELLNESS EXAM: ICD-10-CM

## 2023-02-09 DIAGNOSIS — Z00.00 ENCOUNTER FOR MEDICARE ANNUAL WELLNESS EXAM: ICD-10-CM

## 2023-02-10 ENCOUNTER — TELEPHONE (OUTPATIENT)
Dept: FAMILY MEDICINE | Facility: CLINIC | Age: 63
End: 2023-02-10
Payer: MEDICARE

## 2023-02-10 DIAGNOSIS — E78.5 HYPERLIPIDEMIA, UNSPECIFIED HYPERLIPIDEMIA TYPE: ICD-10-CM

## 2023-02-10 DIAGNOSIS — M10.9 ACUTE GOUT OF RIGHT WRIST, UNSPECIFIED CAUSE: ICD-10-CM

## 2023-02-10 DIAGNOSIS — F51.02 TRANSIENT INSOMNIA: ICD-10-CM

## 2023-02-10 DIAGNOSIS — M1A.0710 IDIOPATHIC CHRONIC GOUT OF RIGHT FOOT WITHOUT TOPHUS: Chronic | ICD-10-CM

## 2023-02-10 DIAGNOSIS — M10.9 ACUTE GOUT OF LEFT WRIST, UNSPECIFIED CAUSE: ICD-10-CM

## 2023-02-10 RX ORDER — ALPRAZOLAM 1 MG/1
1 TABLET ORAL NIGHTLY PRN
Qty: 30 TABLET | Refills: 5 | OUTPATIENT
Start: 2023-02-10

## 2023-02-10 RX ORDER — LOVASTATIN 40 MG/1
40 TABLET ORAL NIGHTLY
Qty: 90 TABLET | Refills: 0 | Status: SHIPPED | OUTPATIENT
Start: 2023-02-10 | End: 2023-03-14

## 2023-02-10 RX ORDER — PREDNISONE 20 MG/1
20 TABLET ORAL 2 TIMES DAILY
Qty: 10 TABLET | Refills: 0 | OUTPATIENT
Start: 2023-02-10 | End: 2023-02-15

## 2023-02-10 RX ORDER — INDOMETHACIN 50 MG/1
50 CAPSULE ORAL 3 TIMES DAILY PRN
Qty: 90 CAPSULE | Refills: 0 | Status: SHIPPED | OUTPATIENT
Start: 2023-02-10 | End: 2023-03-13

## 2023-02-10 RX ORDER — ALLOPURINOL 100 MG/1
200 TABLET ORAL DAILY
Qty: 180 TABLET | Refills: 0 | Status: SHIPPED | OUTPATIENT
Start: 2023-02-10 | End: 2024-02-16 | Stop reason: SDUPTHER

## 2023-02-10 NOTE — TELEPHONE ENCOUNTER
No new care gaps identified.  [facility] Embedded Care Gaps. Reference number: [id number]. 2/10/2023   9:25:21 AM CST

## 2023-02-10 NOTE — TELEPHONE ENCOUNTER
Covering MD -  Sent medication refill(s) to patient's preferred pharmacy on file.  Prednisone denied - per last PCP note: needs referral if flare not improved after Jan 2023. Xanax Rx not given by PCP (Dr Cosby last prescription Aug 2022)

## 2023-02-16 DIAGNOSIS — F51.02 TRANSIENT INSOMNIA: ICD-10-CM

## 2023-02-16 RX ORDER — ALPRAZOLAM 1 MG/1
1 TABLET ORAL NIGHTLY PRN
Qty: 30 TABLET | Refills: 5 | OUTPATIENT
Start: 2023-02-16

## 2023-02-16 NOTE — TELEPHONE ENCOUNTER
No new care gaps identified.  [facility] Embedded Care Gaps. Reference number: [id number]. 2/16/2023   9:40:30 AM CST

## 2023-03-06 DIAGNOSIS — F51.02 TRANSIENT INSOMNIA: ICD-10-CM

## 2023-03-06 RX ORDER — ALPRAZOLAM 1 MG/1
1 TABLET ORAL NIGHTLY PRN
Qty: 30 TABLET | Refills: 5 | Status: SHIPPED | OUTPATIENT
Start: 2023-03-06 | End: 2024-02-16 | Stop reason: SDUPTHER

## 2023-03-06 NOTE — TELEPHONE ENCOUNTER
No new care gaps identified.  [facility] Embedded Care Gaps. Reference number: [id number]. 3/06/2023   5:40:48 PM CST

## 2023-03-10 ENCOUNTER — LAB VISIT (OUTPATIENT)
Dept: LAB | Facility: HOSPITAL | Age: 63
End: 2023-03-10
Attending: FAMILY MEDICINE
Payer: MEDICARE

## 2023-03-10 DIAGNOSIS — R73.03 PRE-DIABETES: ICD-10-CM

## 2023-03-10 DIAGNOSIS — E78.5 HYPERLIPIDEMIA, UNSPECIFIED HYPERLIPIDEMIA TYPE: ICD-10-CM

## 2023-03-10 DIAGNOSIS — M10.9 ACUTE GOUT OF RIGHT WRIST, UNSPECIFIED CAUSE: ICD-10-CM

## 2023-03-10 LAB
ANION GAP SERPL CALC-SCNC: 11 MMOL/L (ref 8–16)
BUN SERPL-MCNC: 13 MG/DL (ref 8–23)
CALCIUM SERPL-MCNC: 9.9 MG/DL (ref 8.7–10.5)
CHLORIDE SERPL-SCNC: 105 MMOL/L (ref 95–110)
CHOLEST SERPL-MCNC: 213 MG/DL (ref 120–199)
CHOLEST/HDLC SERPL: 5.6 {RATIO} (ref 2–5)
CO2 SERPL-SCNC: 28 MMOL/L (ref 23–29)
CREAT SERPL-MCNC: 1 MG/DL (ref 0.5–1.4)
EST. GFR  (NO RACE VARIABLE): >60 ML/MIN/1.73 M^2
GLUCOSE SERPL-MCNC: 79 MG/DL (ref 70–110)
HDLC SERPL-MCNC: 38 MG/DL (ref 40–75)
HDLC SERPL: 17.8 % (ref 20–50)
LDLC SERPL CALC-MCNC: 150.6 MG/DL (ref 63–159)
NONHDLC SERPL-MCNC: 175 MG/DL
POTASSIUM SERPL-SCNC: 4.1 MMOL/L (ref 3.5–5.1)
SODIUM SERPL-SCNC: 144 MMOL/L (ref 136–145)
TRIGL SERPL-MCNC: 122 MG/DL (ref 30–150)
URATE SERPL-MCNC: 8.3 MG/DL (ref 3.4–7)

## 2023-03-10 PROCEDURE — 83036 HEMOGLOBIN GLYCOSYLATED A1C: CPT | Mod: HCNC | Performed by: FAMILY MEDICINE

## 2023-03-10 PROCEDURE — 36415 COLL VENOUS BLD VENIPUNCTURE: CPT | Mod: HCNC,PO | Performed by: FAMILY MEDICINE

## 2023-03-10 PROCEDURE — 80061 LIPID PANEL: CPT | Mod: HCNC | Performed by: FAMILY MEDICINE

## 2023-03-10 PROCEDURE — 84550 ASSAY OF BLOOD/URIC ACID: CPT | Mod: HCNC | Performed by: FAMILY MEDICINE

## 2023-03-10 PROCEDURE — 80048 BASIC METABOLIC PNL TOTAL CA: CPT | Mod: HCNC | Performed by: FAMILY MEDICINE

## 2023-03-11 LAB
ESTIMATED AVG GLUCOSE: 117 MG/DL (ref 68–131)
HBA1C MFR BLD: 5.7 % (ref 4–5.6)

## 2023-03-14 ENCOUNTER — OFFICE VISIT (OUTPATIENT)
Dept: FAMILY MEDICINE | Facility: CLINIC | Age: 63
End: 2023-03-14
Payer: MEDICARE

## 2023-03-14 VITALS
HEART RATE: 83 BPM | WEIGHT: 315 LBS | BODY MASS INDEX: 42.66 KG/M2 | SYSTOLIC BLOOD PRESSURE: 142 MMHG | TEMPERATURE: 98 F | OXYGEN SATURATION: 98 % | DIASTOLIC BLOOD PRESSURE: 84 MMHG | HEIGHT: 72 IN

## 2023-03-14 DIAGNOSIS — E78.5 HYPERLIPIDEMIA, UNSPECIFIED HYPERLIPIDEMIA TYPE: ICD-10-CM

## 2023-03-14 DIAGNOSIS — I10 ESSENTIAL HYPERTENSION: Primary | ICD-10-CM

## 2023-03-14 DIAGNOSIS — R73.03 PRE-DIABETES: ICD-10-CM

## 2023-03-14 DIAGNOSIS — M1A.0710 IDIOPATHIC CHRONIC GOUT OF RIGHT FOOT WITHOUT TOPHUS: ICD-10-CM

## 2023-03-14 DIAGNOSIS — Z12.5 SCREENING FOR PROSTATE CANCER: ICD-10-CM

## 2023-03-14 DIAGNOSIS — Z12.11 COLON CANCER SCREENING: ICD-10-CM

## 2023-03-14 DIAGNOSIS — G47.33 OSA (OBSTRUCTIVE SLEEP APNEA): ICD-10-CM

## 2023-03-14 DIAGNOSIS — G47.00 INSOMNIA, UNSPECIFIED TYPE: ICD-10-CM

## 2023-03-14 DIAGNOSIS — E66.01 CLASS 3 SEVERE OBESITY DUE TO EXCESS CALORIES WITH SERIOUS COMORBIDITY AND BODY MASS INDEX (BMI) OF 45.0 TO 49.9 IN ADULT: ICD-10-CM

## 2023-03-14 PROCEDURE — 3044F PR MOST RECENT HEMOGLOBIN A1C LEVEL <7.0%: ICD-10-PCS | Mod: HCNC,CPTII,S$GLB, | Performed by: FAMILY MEDICINE

## 2023-03-14 PROCEDURE — 3008F PR BODY MASS INDEX (BMI) DOCUMENTED: ICD-10-PCS | Mod: HCNC,CPTII,S$GLB, | Performed by: FAMILY MEDICINE

## 2023-03-14 PROCEDURE — 1159F MED LIST DOCD IN RCRD: CPT | Mod: HCNC,CPTII,S$GLB, | Performed by: FAMILY MEDICINE

## 2023-03-14 PROCEDURE — 99999 PR PBB SHADOW E&M-EST. PATIENT-LVL V: ICD-10-PCS | Mod: PBBFAC,HCNC,, | Performed by: FAMILY MEDICINE

## 2023-03-14 PROCEDURE — 3079F PR MOST RECENT DIASTOLIC BLOOD PRESSURE 80-89 MM HG: ICD-10-PCS | Mod: HCNC,CPTII,S$GLB, | Performed by: FAMILY MEDICINE

## 2023-03-14 PROCEDURE — 3079F DIAST BP 80-89 MM HG: CPT | Mod: HCNC,CPTII,S$GLB, | Performed by: FAMILY MEDICINE

## 2023-03-14 PROCEDURE — 3008F BODY MASS INDEX DOCD: CPT | Mod: HCNC,CPTII,S$GLB, | Performed by: FAMILY MEDICINE

## 2023-03-14 PROCEDURE — 99215 PR OFFICE/OUTPT VISIT, EST, LEVL V, 40-54 MIN: ICD-10-PCS | Mod: HCNC,S$GLB,, | Performed by: FAMILY MEDICINE

## 2023-03-14 PROCEDURE — 3044F HG A1C LEVEL LT 7.0%: CPT | Mod: HCNC,CPTII,S$GLB, | Performed by: FAMILY MEDICINE

## 2023-03-14 PROCEDURE — 3077F SYST BP >= 140 MM HG: CPT | Mod: HCNC,CPTII,S$GLB, | Performed by: FAMILY MEDICINE

## 2023-03-14 PROCEDURE — 1159F PR MEDICATION LIST DOCUMENTED IN MEDICAL RECORD: ICD-10-PCS | Mod: HCNC,CPTII,S$GLB, | Performed by: FAMILY MEDICINE

## 2023-03-14 PROCEDURE — 99999 PR PBB SHADOW E&M-EST. PATIENT-LVL V: CPT | Mod: PBBFAC,HCNC,, | Performed by: FAMILY MEDICINE

## 2023-03-14 PROCEDURE — 99215 OFFICE O/P EST HI 40 MIN: CPT | Mod: HCNC,S$GLB,, | Performed by: FAMILY MEDICINE

## 2023-03-14 PROCEDURE — 3077F PR MOST RECENT SYSTOLIC BLOOD PRESSURE >= 140 MM HG: ICD-10-PCS | Mod: HCNC,CPTII,S$GLB, | Performed by: FAMILY MEDICINE

## 2023-03-14 PROCEDURE — 1160F RVW MEDS BY RX/DR IN RCRD: CPT | Mod: HCNC,CPTII,S$GLB, | Performed by: FAMILY MEDICINE

## 2023-03-14 PROCEDURE — 1160F PR REVIEW ALL MEDS BY PRESCRIBER/CLIN PHARMACIST DOCUMENTED: ICD-10-PCS | Mod: HCNC,CPTII,S$GLB, | Performed by: FAMILY MEDICINE

## 2023-03-14 RX ORDER — ATORVASTATIN CALCIUM 40 MG/1
40 TABLET, FILM COATED ORAL DAILY
Qty: 90 TABLET | Refills: 3 | Status: SHIPPED | OUTPATIENT
Start: 2023-03-14 | End: 2024-02-16 | Stop reason: SDUPTHER

## 2023-03-14 NOTE — PATIENT INSTRUCTIONS
Stop taking lovastatin and start taking Lipitor daily.  Take 200 mg of allopurinol daily.  You may take 2 tablets at the same time.

## 2023-03-14 NOTE — PROGRESS NOTES
Assessment & Plan  Essential hypertension  -     CBC Auto Differential; Future; Expected date: 03/14/2023  -     Comprehensive Metabolic Panel; Future; Expected date: 03/14/2023  -     Hemoglobin A1C; Future; Expected date: 03/14/2023  -     Lipid Panel; Future; Expected date: 03/14/2023    Uncontrolled because of non-compliance with medication. Patient to resume usual therapy.     SHARAN (obstructive sleep apnea)  Patient is still without a CPAP but has an appointment with Pulmonology on 3/27 for SHARAN eval.    Hyperlipidemia, unspecified hyperlipidemia type  -     Lipid Panel; Future; Expected date: 03/14/2023  -     atorvastatin (LIPITOR) 40 MG tablet; Take 1 tablet (40 mg total) by mouth once daily.  Dispense: 90 tablet; Refill: 3    Class 3 severe obesity due to excess calories with serious comorbidity and body mass index (BMI) of 45.0 to 49.9 in adult    Pre-diabetes  -     CBC Auto Differential; Future; Expected date: 03/14/2023  -     Comprehensive Metabolic Panel; Future; Expected date: 03/14/2023  -     Hemoglobin A1C; Future; Expected date: 03/14/2023    HLD is uncontrolled. A1c has improved to 5.7 but still in pre-DM range.  D/C lovastatin and start Lipitor.   Counseled on Mediterranean diet and portion control to decrease his risk of CVD and help with weight loss. Patient to start exercise routine.    Idiopathic chronic gout of right foot without tophus  -     Uric Acid; Future; Expected date: 03/14/2023    Patient denies gout flares since last visit. Reviewed elevated uric acid. Advised to resume allopurinol 200 mg daily.    Insomnia, unspecified type  Controlled. Continue current therapy.     Screening for prostate cancer  -     PSA, Screening; Future; Expected date: 03/14/2023    Colon cancer screening  -     Ambulatory referral/consult to Endo Procedure ; Future; Expected date: 03/15/2023      Health maintenance reviewed & addressed above.    Follow-up: Follow up in about 6 months (around  9/14/2023).  ______________________________________________________________________    Chief Complaint  Chief Complaint   Patient presents with    Hypertension    Gout       HPI  Reagan Carbajal is a 62 y.o. male with medical diagnoses as listed in the medical history and problem list that presents to the office to follow up on his chronic conditions.  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  The passing of his older sister has been an additional stressor for him.  He admits to not making the necessary dietary and lifestyle changes to improve his cholesterol and lose weight.  However when he saw his most recent labs, he just started a membership to a gym.  Gout has been well controlled, despite not consistently taking 200 mg allopurinol as previously instructed.  He also admits to forgetting to take his antihypertensive because of stress at work.    Most recent pertinent workup:     Last CBC Results:   Lab Results   Component Value Date    WBC 9.11 11/22/2022    HGB 14.7 11/22/2022    HCT 45.6 11/22/2022     11/22/2022       Last CMP Results  Lab Results   Component Value Date     03/10/2023    K 4.1 03/10/2023     03/10/2023    CO2 28 03/10/2023    BUN 13 03/10/2023    CREATININE 1.0 03/10/2023    CALCIUM 9.9 03/10/2023    ALBUMIN 3.8 11/22/2022    AST 16 11/22/2022    ALT 20 11/22/2022       Last Lipids  Lab Results   Component Value Date    CHOL 213 (H) 03/10/2023    TRIG 122 03/10/2023    HDL 38 (L) 03/10/2023    LDLCALC 150.6 03/10/2023   The 10-year ASCVD risk score (Catrina JACKSON, et al., 2019) is: 19.7%    Values used to calculate the score:      Age: 62 years      Sex: Male      Is Non- : Yes      Diabetic: No      Tobacco smoker: No      Systolic Blood Pressure: 142 mmHg      Is BP treated: Yes      HDL Cholesterol: 38 mg/dL      Total Cholesterol: 213 mg/dL      Last A1C  Lab Results   Component Value Date     HGBA1C 5.7 (H) 03/10/2023         Health Maintenance         Date Due Completion Date    Shingles Vaccine (1 of 2) Never done ---    Colorectal Cancer Screening 2021 5/3/2016    COVID-19 Vaccine (3 - Booster for [place] series) 2022    Hemoglobin A1c (Prediabetes) 03/10/2024 3/10/2023    TETANUS VACCINE 2026    Lipid Panel 03/10/2028 3/10/2023              PAST MEDICAL HISTORY:  Past Medical History:   Diagnosis Date    Anxiety     Colon polyp     Colon polyp     CPAP (continuous positive airway pressure) dependence     does not use    GERD (gastroesophageal reflux disease)     Gout 2018    HTN (hypertension) 2012    Hyperlipidemia     Insomnia with sleep apnea     Obesity, morbid, BMI 40.0-49.9 2014       PAST SURGICAL HISTORY:  Past Surgical History:   Procedure Laterality Date    COLONOSCOPY N/A 5/3/2016    Procedure: COLONOSCOPY;  Surgeon: Chance Glynn MD;  Location: [address]);  Service: Endoscopy;  Laterality: N/A;    COLONOSCOPY W/ POLYPECTOMY         SOCIAL HISTORY:  Social History     Socioeconomic History    Marital status:    Occupational History     Employer: [employer]   Tobacco Use    Smoking status: Former     Types: Cigarettes, Cigars     Quit date: 5/3/1993     Years since quittin.8    Smokeless tobacco: Never   Substance and Sexual Activity    Alcohol use: No     Alcohol/week: 0.0 standard drinks    Drug use: No    Sexual activity: Yes     Partners: Female     Birth control/protection: None       FAMILY HISTORY:  Family History   Problem Relation Age of Onset    Heart disease Mother     Hyperlipidemia Mother     Hypertension Mother     Lymphoma Mother     Hypertension Father     Hepatitis Brother        ALLERGIES AND MEDICATIONS: updated and reviewed.  Review of patient's allergies indicates:   Allergen Reactions    Diovan [valsartan]      Ringing in the ears    Lisinopril Swelling    Tramadol      Nausea       Triamterene      Current Outpatient Medications   Medication Sig Dispense Refill    allopurinoL (ZYLOPRIM) 100 MG tablet Take 2 tablets (200 mg total) by mouth once daily. 180 tablet 0    ALPRAZolam (XANAX) 1 MG tablet Take 1 tablet (1 mg total) by mouth nightly as needed. 30 tablet 5    amLODIPine (NORVASC) 10 MG tablet Take 1 tablet (10 mg total) by mouth once daily. 90 tablet 3    aspirin (ECOTRIN) 81 MG EC tablet Take 81 mg by mouth once daily.      betamethasone acetate-betamethasone sodium phosphate (CELESTONE) 6 mg/mL injection       cetirizine (ZYRTEC) 10 MG tablet Take 1 tablet (10 mg total) by mouth 2 (two) times daily. for 7 days 90 tablet 3    chlorthalidone (HYGROTEN) 25 MG Tab Take 1 tablet (25 mg total) by mouth once daily. 90 tablet 3    colchicine (MITIGARE) 0.6 mg Cap Take 1.2 mg po at the onset of a gout flare, then 0.6 mg po 1 hour thereafter for max dose of 1.8 mg a day. 30 capsule 1    diclofenac (VOLTAREN) 50 MG EC tablet Take 1 tablet (50 mg total) by mouth 2 (two) times daily. 40 tablet 1    EPINEPHrine 0.15 mg/0.15 mL AtIn Inject 0.3 mLs (0.3 mg total) into the muscle as needed (Administer into outer aspect of thigh. May repeat one time in 15 minutes if symptoms persist). 1 Device 0    famotidine (PEPCID) 40 MG tablet Take 1 tablet (40 mg total) by mouth once daily. 180 tablet 3    hyoscyamine (ANASPAZ,LEVSIN) 0.125 mg Tab Take 1 tablet (125 mcg total) by mouth every 6 (six) hours as needed. 40 tablet 2    indomethacin (INDOCIN) 50 MG capsule TAKE 1 CAPSULE (50 MG TOTAL) BY MOUTH 3  TIMES DAILY AS NEEDED (GOUT FLARES.  STOP AS SOON AS FLARE IMPROVED). 90 capsule 0    atorvastatin (LIPITOR) 40 MG tablet Take 1 tablet (40 mg total) by mouth once daily. 90 tablet 3    FLUARIX QUAD 8571-8394, PF, 60 mcg (15 mcg x 4)/0.5 mL Syrg        No current facility-administered medications for this visit.         ROS  Review of Systems   Constitutional:  Negative for activity change, fever and unexpected  weight change.   HENT:  Negative for congestion and sore throat.    Eyes:  Negative for photophobia and visual disturbance.   Respiratory:  Negative for cough and shortness of breath.    Cardiovascular:  Negative for chest pain and leg swelling.   Gastrointestinal:  Negative for abdominal pain, constipation, diarrhea, nausea and vomiting.   Endocrine: Negative for polydipsia, polyphagia and polyuria.   Genitourinary:  Negative for dysuria and urgency.   Musculoskeletal:  Negative for arthralgias and gait problem.   Skin:  Negative for color change.   Neurological:  Negative for dizziness, weakness and headaches.   Psychiatric/Behavioral:  Negative for dysphoric mood and sleep disturbance. The patient is nervous/anxious (Stress).          Physical Exam  Vitals:    03/14/23 1053   BP: (!) 142/84   BP Location: Right arm   Patient Position: Sitting   BP Method: Large (Manual)   Pulse: 83   Temp: 97.8 °F (36.6 °C)   TempSrc: Oral   SpO2: 98%   Weight: (!) 155.4 kg (342 lb 9.5 oz)   Height: 6' (1.829 m)    Body mass index is 46.46 kg/m².  Weight: (!) 155.4 kg (342 lb 9.5 oz)   Height: 6' (182.9 cm)   Physical Exam  Constitutional:       General: He is not in acute distress.     Appearance: He is obese.   HENT:      Head: Normocephalic and atraumatic.   Neck:      Thyroid: No thyromegaly.      Vascular: No carotid bruit.   Cardiovascular:      Rate and Rhythm: Normal rate and regular rhythm.      Pulses: Normal pulses.      Heart sounds: Normal heart sounds.   Pulmonary:      Effort: Pulmonary effort is normal. No respiratory distress.      Breath sounds: Normal breath sounds.   Musculoskeletal:      Cervical back: Neck supple.      Right lower leg: No edema.      Left lower leg: No edema.   Lymphadenopathy:      Cervical: No cervical adenopathy.   Skin:     General: Skin is warm and dry.      Findings: No rash.   Neurological:      General: No focal deficit present.      Mental Status: He is alert and oriented to person,  place, and time.   Psychiatric:         Mood and Affect: Mood normal.         Behavior: Behavior normal.         Thought Content: Thought content normal.

## 2023-03-23 ENCOUNTER — PATIENT MESSAGE (OUTPATIENT)
Dept: ADMINISTRATIVE | Facility: HOSPITAL | Age: 63
End: 2023-03-23
Payer: MEDICARE

## 2023-03-27 ENCOUNTER — TELEPHONE (OUTPATIENT)
Dept: FAMILY MEDICINE | Facility: CLINIC | Age: 63
End: 2023-03-27
Payer: MEDICARE

## 2023-03-27 NOTE — TELEPHONE ENCOUNTER
----- Message from Kathy Campbell sent at 3/27/2023  9:50 AM CDT -----  .Type: Patient Call Back    Who called: Self     What is the request in detail: Requesting a referral to see Pulmonary at [facility]     Can the clinic reply by [facility]? No     Would the patient rather a call back or a response via [facility]? Call back     Best call back number: .[phone] (home)       Additional Information:

## 2023-03-27 NOTE — TELEPHONE ENCOUNTER
Pt informed that Sleep Medicine Referral has been faxed to [facility] as requested.

## 2023-04-04 ENCOUNTER — TELEPHONE (OUTPATIENT)
Dept: FAMILY MEDICINE | Facility: CLINIC | Age: 63
End: 2023-04-04
Payer: MEDICARE

## 2023-04-04 NOTE — TELEPHONE ENCOUNTER
----- Message from Kathy Campbell sent at 4/4/2023  2:17 PM CDT -----  .Type: Patient Call Back    Who called: Ayanna mcginnis/ Jefe Paz     What is the request in detail: Requesting last clinical notes     Can the clinic reply by [facility]? No     Would the patient rather a call back or a response via [facility]? Call back     Best call back number:[phone] (FAX)    Additional Information:

## 2023-06-04 NOTE — DISCHARGE INSTRUCTIONS
Call to schedule your stress test.    Follow up with your primary care provider and cardiology.   Problem: Neurological Deficit  Goal: Neurological status is stable or improving  Description: Interventions:  - Monitor and assess patient's level of consciousness, motor function, sensory function, and level of assistance needed for ADLs  - Monitor and report changes from baseline  Collaborate with interdisciplinary team to initiate plan and implement interventions as ordered  - Provide and maintain a safe environment  - Consider seizure precautions  - Consider fall precautions  - Consider aspiration precautions  - Consider bleeding precautions  Outcome: Progressing     Problem: Activity Intolerance/Impaired Mobility  Goal: Mobility/activity is maintained at optimum level for patient  Description: Interventions:  - Assess and monitor patient  barriers to mobility and need for assistive/adaptive devices  - Assess patient's emotional response to limitations  - Collaborate with interdisciplinary team and initiate plans and interventions as ordered  - Encourage independent activity per ability   - Maintain proper body alignment  - Perform active/passive rom as tolerated/ordered  - Plan activities to conserve energy   - Turn patient as appropriate  Outcome: Progressing     Problem: Communication Impairment  Goal: Ability to express needs and understand communication  Description: Assess patient's communication skills and ability to understand information  Patient will demonstrate use of effective communication techniques, alternative methods of communication and understanding even if not able to speak  - Encourage communication and provide alternate methods of communication as needed  - Collaborate with case management/ for discharge needs  - Include patient/family/caregiver in decisions related to communication    Outcome: Progressing     Problem: Potential for Aspiration  Goal: Non-ventilated patient's risk of aspiration is minimized  Description: Assess and monitor vital signs, respiratory status, and labs (WBC)  Monitor for signs of aspiration (tachypnea, cough, rales, wheezing, cyanosis, fever)  - Assess and monitor patient's ability to swallow  - Place patient up in chair to eat if possible  - HOB up at 90 degrees to eat if unable to get patient up into chair   - Supervise patient during oral intake  - Instruct patient/ family to take small bites  - Instruct patient/ family to take small single sips when taking liquids  - Follow patient-specific strategies generated by speech pathologist   Outcome: Progressing  Goal: Ventilated patient's risk of aspiration is minimized  Description: Assess and monitor vital signs, respiratory status, airway cuff pressure, and labs (WBC)  Monitor for signs of aspiration (tachypnea, cough, rales, wheezing, cyanosis, fever)  - Elevate head of bed 30 degrees if patient has tube feeding   - Monitor tube feeding  Outcome: Progressing     Problem: Nutrition  Goal: Nutrition/Hydration status is improving  Description: Monitor and assess patient's nutrition/hydration status for malnutrition (ex- brittle hair, bruises, dry skin, pale skin and conjunctiva, muscle wasting, smooth red tongue, and disorientation)  Collaborate with interdisciplinary team and initiate plan and interventions as ordered  Monitor patient's weight and dietary intake as ordered or per policy  Utilize nutrition screening tool and intervene per policy  Determine patient's food preferences and provide high-protein, high-caloric foods as appropriate  - Assist patient with eating   - Allow adequate time for meals   - Encourage patient to take dietary supplement as ordered  - Collaborate with clinical nutritionist   - Include patient/family/caregiver in decisions related to nutrition    Outcome: Progressing     Problem: PAIN - ADULT  Goal: Verbalizes/displays adequate comfort level or baseline comfort level  Description: Interventions:  - Encourage patient to monitor pain and request assistance  - Assess pain using appropriate pain scale  - Administer analgesics based on type and severity of pain and evaluate response  - Implement non-pharmacological measures as appropriate and evaluate response  - Consider cultural and social influences on pain and pain management  - Notify physician/advanced practitioner if interventions unsuccessful or patient reports new pain  Outcome: Progressing     Problem: INFECTION - ADULT  Goal: Absence or prevention of progression during hospitalization  Description: INTERVENTIONS:  - Assess and monitor for signs and symptoms of infection  - Monitor lab/diagnostic results  - Monitor all insertion sites, i e  indwelling lines, tubes, and drains  - Monitor endotracheal if appropriate and nasal secretions for changes in amount and color  - [place] appropriate cooling/warming therapies per order  - Administer medications as ordered  - Instruct and encourage patient and family to use good hand hygiene technique  - Identify and instruct in appropriate isolation precautions for identified infection/condition  Outcome: Progressing  Goal: Absence of fever/infection during neutropenic period  Description: INTERVENTIONS:  - Monitor WBC    Outcome: Progressing     Problem: SAFETY ADULT  Goal: Patient will remain free of falls  Description: INTERVENTIONS:  - Educate patient/family on patient safety including physical limitations  - Instruct patient to call for assistance with activity   - Consult OT/PT to assist with strengthening/mobility   - Keep Call bell within reach  - Keep bed low and locked with side rails adjusted as appropriate  - Keep care items and personal belongings within reach  - Initiate and maintain comfort rounds        - Consider moving patient to room near nurses station  Outcome: Progressing  Goal: Maintain or return to baseline ADL function  Description: INTERVENTIONS:  -  Assess patient's ability to carry out ADLs; assess patient's baseline for ADL function and identify physical deficits which impact ability to perform ADLs (bathing, care of mouth/teeth, toileting, grooming, dressing, etc )  - Assess/evaluate cause of self-care deficits   - Assess range of motion  - Assess patient's mobility; develop plan if impaired  - Assess patient's need for assistive devices and provide as appropriate  - Encourage maximum independence but intervene and supervise when necessary  - Involve family in performance of ADLs  - Assess for home care needs following discharge   - Consider OT consult to assist with ADL evaluation and planning for discharge  - Provide patient education as appropriate  Outcome: Progressing  Goal: Maintains/Returns to pre admission functional level  Description: INTERVENTIONS:  - Perform BMAT or MOVE assessment daily    - Set and communicate daily mobility goal to care team and patient/family/caregiver     - Collaborate with rehabilitation services on mobility goals if consulted  - Ambulate patient 2 times a day  - Out of bed for toileting  - Record patient progress and toleration of activity level   Outcome: Progressing     Problem: DISCHARGE PLANNING  Goal: Discharge to home or other facility with appropriate resources  Description: INTERVENTIONS:  - Identify barriers to discharge w/patient and caregiver  - Arrange for needed discharge resources and transportation as appropriate  - Identify discharge learning needs (meds, wound care, etc )  - Arrange for interpretive services to assist at discharge as needed  - Refer to Case Management Department for coordinating discharge planning if the patient needs post-hospital services based on physician/advanced practitioner order or complex needs related to functional status, cognitive ability, or social support system  Outcome: Progressing     Problem: Knowledge Deficit  Goal: Patient/family/caregiver demonstrates understanding of disease process, treatment plan, medications, and discharge instructions  Description: Complete learning assessment and assess knowledge base    Interventions:  - Provide teaching at level of understanding  - Provide teaching via preferred learning methods  Outcome: Progressing

## 2023-06-19 ENCOUNTER — PATIENT MESSAGE (OUTPATIENT)
Dept: ADMINISTRATIVE | Facility: HOSPITAL | Age: 63
End: 2023-06-19
Payer: MEDICARE

## 2023-06-19 ENCOUNTER — PATIENT OUTREACH (OUTPATIENT)
Dept: ADMINISTRATIVE | Facility: HOSPITAL | Age: 63
End: 2023-06-19
Payer: MEDICARE

## 2023-06-19 NOTE — PROGRESS NOTES
[facility] 1 BP Gap Report 05.01.23 -  Left message for patient to call our office. Updated blood pressure reading needed.

## 2023-07-05 ENCOUNTER — TELEPHONE (OUTPATIENT)
Dept: FAMILY MEDICINE | Facility: CLINIC | Age: 63
End: 2023-07-05
Payer: MEDICARE

## 2023-07-05 DIAGNOSIS — M1A.0710 IDIOPATHIC CHRONIC GOUT OF RIGHT FOOT WITHOUT TOPHUS: Chronic | ICD-10-CM

## 2023-07-05 RX ORDER — INDOMETHACIN 50 MG/1
CAPSULE ORAL
Qty: 90 CAPSULE | Refills: 0 | Status: SHIPPED | OUTPATIENT
Start: 2023-07-05

## 2023-07-06 NOTE — TELEPHONE ENCOUNTER
Patient states he take the medication (INDOMETHACIN) 4 times a week. Patient states for the past week he has been trying to stay away from sweets and sugars. Patient states he haven't a gout flare up in a while. Patient states he take his medication (ALLOPURINOL) twice a day.  Please advise

## 2023-07-25 ENCOUNTER — TELEPHONE (OUTPATIENT)
Dept: FAMILY MEDICINE | Facility: CLINIC | Age: 63
End: 2023-07-25
Payer: MEDICARE

## 2023-07-25 NOTE — TELEPHONE ENCOUNTER
----- Message from Lolis Mina sent at 7/24/2023  4:54 PM CDT -----  Regarding: patient call back  Type: Patient Call Back    Who called: Self     What is the request in detail: Asked for a call back about his recent hospital visit     Can the clinic reply by [facility]? No     Would the patient rather a call back or a response via [facility]? Call     Best call back number: .[phone]

## 2023-08-01 ENCOUNTER — OFFICE VISIT (OUTPATIENT)
Dept: PULMONOLOGY | Facility: CLINIC | Age: 63
End: 2023-08-01
Payer: MEDICARE

## 2023-08-01 VITALS
HEIGHT: 72 IN | HEART RATE: 82 BPM | WEIGHT: 315 LBS | BODY MASS INDEX: 42.66 KG/M2 | OXYGEN SATURATION: 97 % | DIASTOLIC BLOOD PRESSURE: 76 MMHG | SYSTOLIC BLOOD PRESSURE: 147 MMHG

## 2023-08-01 DIAGNOSIS — G47.33 OSA (OBSTRUCTIVE SLEEP APNEA): ICD-10-CM

## 2023-08-01 DIAGNOSIS — E66.01 OBESITY, MORBID, BMI 40.0-49.9: Chronic | ICD-10-CM

## 2023-08-01 PROCEDURE — 3044F HG A1C LEVEL LT 7.0%: CPT | Mod: CPTII,S$GLB,, | Performed by: INTERNAL MEDICINE

## 2023-08-01 PROCEDURE — 3078F PR MOST RECENT DIASTOLIC BLOOD PRESSURE < 80 MM HG: ICD-10-PCS | Mod: CPTII,S$GLB,, | Performed by: INTERNAL MEDICINE

## 2023-08-01 PROCEDURE — 99999 PR PBB SHADOW E&M-EST. PATIENT-LVL IV: ICD-10-PCS | Mod: PBBFAC,,, | Performed by: INTERNAL MEDICINE

## 2023-08-01 PROCEDURE — 3077F SYST BP >= 140 MM HG: CPT | Mod: CPTII,S$GLB,, | Performed by: INTERNAL MEDICINE

## 2023-08-01 PROCEDURE — 3078F DIAST BP <80 MM HG: CPT | Mod: CPTII,S$GLB,, | Performed by: INTERNAL MEDICINE

## 2023-08-01 PROCEDURE — 3044F PR MOST RECENT HEMOGLOBIN A1C LEVEL <7.0%: ICD-10-PCS | Mod: CPTII,S$GLB,, | Performed by: INTERNAL MEDICINE

## 2023-08-01 PROCEDURE — 3077F PR MOST RECENT SYSTOLIC BLOOD PRESSURE >= 140 MM HG: ICD-10-PCS | Mod: CPTII,S$GLB,, | Performed by: INTERNAL MEDICINE

## 2023-08-01 PROCEDURE — 1159F MED LIST DOCD IN RCRD: CPT | Mod: CPTII,S$GLB,, | Performed by: INTERNAL MEDICINE

## 2023-08-01 PROCEDURE — 1159F PR MEDICATION LIST DOCUMENTED IN MEDICAL RECORD: ICD-10-PCS | Mod: CPTII,S$GLB,, | Performed by: INTERNAL MEDICINE

## 2023-08-01 PROCEDURE — 99203 OFFICE O/P NEW LOW 30 MIN: CPT | Mod: S$GLB,,, | Performed by: INTERNAL MEDICINE

## 2023-08-01 PROCEDURE — 3008F BODY MASS INDEX DOCD: CPT | Mod: CPTII,S$GLB,, | Performed by: INTERNAL MEDICINE

## 2023-08-01 PROCEDURE — 3008F PR BODY MASS INDEX (BMI) DOCUMENTED: ICD-10-PCS | Mod: CPTII,S$GLB,, | Performed by: INTERNAL MEDICINE

## 2023-08-01 PROCEDURE — 99999 PR PBB SHADOW E&M-EST. PATIENT-LVL IV: CPT | Mod: PBBFAC,,, | Performed by: INTERNAL MEDICINE

## 2023-08-01 PROCEDURE — 99203 PR OFFICE/OUTPT VISIT, NEW, LEVL III, 30-44 MIN: ICD-10-PCS | Mod: S$GLB,,, | Performed by: INTERNAL MEDICINE

## 2023-08-01 NOTE — PATIENT INSTRUCTIONS
Call [facility] at [phone] if you don't get a call in 7-10 working days.

## 2023-08-01 NOTE — PROGRESS NOTES
Reagan Carbajal  was seen as a new patient at the request of  Madeline Campbell DO for the evaluation of  jann.    CHIEF COMPLAINT:    Chief Complaint   Patient presents with    Apnea       HISTORY OF PRESENT ILLNESS: Reagan Carbajal is a 63 y.o. male is here for sleep evaluation.   Patient was diagnosed with jann in 2008 with ahi of 52.  Patient was doing well with cpap until machine stopped working.  Patient is using his friend recall Dreamstation.      STOPBANG during initial visit:  Snore:  yes  Tire:  yes  Observed apnea:  yes  Pressure (HTN):  yes  BMI:  Body mass index is 45.34 kg/m².   Age:  63 y.o.  Neck (inch):  19  Gender:  male    No parasomnia.  No cataplexy.  Chronic left leg pain.      [place] Sleepiness Scale score during initial sleep evaluation was 10.    SLEEP ROUTINE:  Activity the hour prior to sleep: watch tv     Bed partner:  wife on some night  Time to bed:  10-11 pm   Lights off:  tv is on   Sleep onset latency:  watch tv until asleep (~30 minutes)        Disruptions or awakenings:    3 times for bathroom without cpap    Wakeup time:      7 am   Perceived sleep quality:  tire without cpap       Daytime naps:      none  Weekend sleep routine:      same   Caffeine use: 1 cup of coffee in am   exercise habit:   starting gym      PAST MEDICAL HISTORY:    Active Ambulatory Problems     Diagnosis Date Noted    Essential hypertension 11/12/2012    GERD (gastroesophageal reflux disease) 11/12/2012    Obesity, morbid, BMI 40.0-49.9 06/12/2014    Idiopathic chronic gout of right foot without tophus 09/13/2018    Angio-edema 12/18/2019    JANN (obstructive sleep apnea) 08/01/2023     Resolved Ambulatory Problems     Diagnosis Date Noted    Obesity 11/12/2012    Insomnia 11/12/2012    Ear infection 06/12/2014    Fatigue 06/12/2014    Chest pain 11/05/2014    Palpitations 02/05/2021     Past Medical History:   Diagnosis Date    Anxiety     Colon polyp     Colon polyp     CPAP (continuous positive airway  pressure) dependence     Gout 2018    HTN (hypertension) 2012    Hyperlipidemia     Insomnia with sleep apnea                 PAST SURGICAL HISTORY:    Past Surgical History:   Procedure Laterality Date    COLONOSCOPY N/A 5/3/2016    Procedure: COLONOSCOPY;  Surgeon: Chance Glynn MD;  Location: [facility] ([address] [place] [address]);  Service: Endoscopy;  Laterality: N/A;    COLONOSCOPY W/ POLYPECTOMY           FAMILY HISTORY:                Family History   Problem Relation Age of Onset    Heart disease Mother     Hyperlipidemia Mother     Hypertension Mother     Lymphoma Mother     Hypertension Father     Hepatitis Brother        SOCIAL HISTORY:          Tobacco:   Social History     Tobacco Use   Smoking Status Former    Current packs/day: 0.00    Types: Cigarettes, Cigars    Quit date: 5/3/1993    Years since quittin.2   Smokeless Tobacco Never       alcohol use:    Social History     Substance and Sexual Activity   Alcohol Use No    Alcohol/week: 0.0 standard drinks of alcohol                 Occupation: retire    ALLERGIES:    Review of patient's allergies indicates:   Allergen Reactions    Diovan [valsartan]      Ringing in the ears    Lisinopril Swelling    Tramadol      Nausea      Triamterene        CURRENT MEDICATIONS:    Current Outpatient Medications   Medication Sig Dispense Refill    allopurinoL (ZYLOPRIM) 100 MG tablet Take 2 tablets (200 mg total) by mouth once daily. 180 tablet 0    ALPRAZolam (XANAX) 1 MG tablet Take 1 tablet (1 mg total) by mouth nightly as needed. 30 tablet 5    amLODIPine (NORVASC) 10 MG tablet Take 1 tablet (10 mg total) by mouth once daily. 90 tablet 3    aspirin (ECOTRIN) 81 MG EC tablet Take 81 mg by mouth once daily.      atorvastatin (LIPITOR) 40 MG tablet Take 1 tablet (40 mg total) by mouth once daily. 90 tablet 3    betamethasone acetate-betamethasone sodium phosphate (CELESTONE) 6 mg/mL injection       chlorthalidone (HYGROTEN) 25 MG Tab Take 1 tablet (25 mg total) by  mouth once daily. 90 tablet 3    colchicine (MITIGARE) 0.6 mg Cap Take 1.2 mg po at the onset of a gout flare, then 0.6 mg po 1 hour thereafter for max dose of 1.8 mg a day. 30 capsule 1    FLUARIX QUAD 3523-1859, PF, 60 mcg (15 mcg x 4)/0.5 mL Syrg       indomethacin (INDOCIN) 50 MG capsule TAKE 1 CAPSULE THREE TIMES DAILY AS NEEDED FOR GOUT FLARES (STOP AS SOON AS FLARE IMPROVED) 90 capsule 0    cetirizine (ZYRTEC) 10 MG tablet Take 1 tablet (10 mg total) by mouth 2 (two) times daily. for 7 days 90 tablet 3    EPINEPHrine 0.15 mg/0.15 mL AtIn Inject 0.3 mLs (0.3 mg total) into the muscle as needed (Administer into outer aspect of thigh. May repeat one time in 15 minutes if symptoms persist). 1 Device 0    famotidine (PEPCID) 40 MG tablet Take 1 tablet (40 mg total) by mouth once daily. 180 tablet 3    hyoscyamine (ANASPAZ,LEVSIN) 0.125 mg Tab Take 1 tablet (125 mcg total) by mouth every 6 (six) hours as needed. 40 tablet 2     No current facility-administered medications for this visit.                  REVIEW OF SYSTEMS:     Sleep related symptoms as per HPI.  CONST:Denies weight gain; loosing 10 lbs since 7/2023 with dietary restriction   HEENT: Denies sinus congestion  PULM: Denies dyspnea  CARD:  Denies palpitations   GI:  Denies acid reflux  : Denies polyuria  NEURO: rare headaches  PSYCH: Denies mood disturbance  HEME: Denies anemia   Otherwise, a balance of systems reviewed is negative.          PHYSICAL EXAM:  Vitals:    08/01/23 0838   BP: (!) 147/76   Pulse: 82   SpO2: 97%   Weight: (!) 151.6 kg (334 lb 5.2 oz)   Height: 6' (1.829 m)   PainSc: 0-No pain     Body mass index is 45.34 kg/m².     GENERAL: Normal development, well groomed  HEENT:  Conjunctivae are non-erythematous; Pupils equal, round, and reactive to light; Nose is symmetrical; Nasal mucosa is pink and moist; Septum is midline; Inferior turbinates are normal; Nasal airflow is normal; Posterior pharynx is pink; Modified Mallampati: 4;  Posterior palate is normal; Tonsils +1; Uvula is normal and pink;Tongue is normal; partial denture; No TMJ tenderness; Jaw opening and protrusion without click and without discomfort.  NECK: Supple. Neck circumference is 19 inches. No thyromegaly. No palpable nodes.     SKIN: On face and neck: No abrasions, no rashes, no lesions.  No subcutaneous nodules are palpable.  RESPIRATORY: Chest is clear to auscultation.  Normal chest expansion and non-labored breathing at rest.  CARDIOVASCULAR: Normal S1, S2.  No murmurs, gallops or rubs. No carotid bruits bilaterally.  EXTREMITIES: No edema. No clubbing. No cyanosis. Station normal. Gait normal.        NEURO/PSYCH: Oriented to time, place and person. Normal attention span and concentration. Affect is full. Mood is normal.                                              DATA   Split study 8/2/08 ahi of 56; optimal cpap of 15    Echo 2/5/21  Technically difficult study  The left ventricle is normal in size with eccentric hypertrophy and normal systolic function. The estimated ejection fraction is 55%  Normal left ventricular diastolic function.  Normal right ventricular size with normal right ventricular systolic function.  There is no pulmonary hypertension.      Lab Results   Component Value Date    TSH 2.050 03/31/2022       ASSESSMENT/PLAN    Problem List Items Addressed This Visit       Obesity, morbid, BMI 40.0-49.9 (Chronic)    Overview     Aware of need for drastic weight loss.   Not interested in bariatric surgery  Will to try appetite suppressant.           Relevant Orders    Ambulatory referral/consult to Bariatric Medicine    CPAP FOR HOME USE    SHARAN (obstructive sleep apnea)    Overview     ahi of 56  Will set up with new apap              Education: During our discussion today, we talked about the etiology of obstructive sleep apnea as well as the potential ramifications of untreated sleep apnea, which could include daytime sleepiness, hypertension, heart disease  and/or stroke.     Precautions: The patient was advised to abstain from driving should they feel sleepy or drowsy.       Thank you for allowing me the opportunity to participate in the care of your patient.    Patient will No follow-ups on file.    Please cc note to  Madeline Campbell DO.    30 minutes of total time spent on the encounter, which includes face to face time and non-face to face time preparing to see the patient (eg, review of tests), Obtaining and/or reviewing separately obtained history, documenting clinical information in the electronic or other health record, independently interpreting results (not separately reported) and communicating results to the patient/family/caregiver, or Care coordination (not separately reported).

## 2023-08-04 ENCOUNTER — OFFICE VISIT (OUTPATIENT)
Dept: FAMILY MEDICINE | Facility: CLINIC | Age: 63
End: 2023-08-04
Payer: MEDICARE

## 2023-08-04 ENCOUNTER — LAB VISIT (OUTPATIENT)
Dept: LAB | Facility: HOSPITAL | Age: 63
End: 2023-08-04
Attending: FAMILY MEDICINE
Payer: MEDICARE

## 2023-08-04 VITALS
TEMPERATURE: 98 F | HEIGHT: 72 IN | BODY MASS INDEX: 42.66 KG/M2 | HEART RATE: 60 BPM | WEIGHT: 315 LBS | SYSTOLIC BLOOD PRESSURE: 138 MMHG | DIASTOLIC BLOOD PRESSURE: 78 MMHG | OXYGEN SATURATION: 97 %

## 2023-08-04 DIAGNOSIS — E78.5 HYPERLIPIDEMIA, UNSPECIFIED HYPERLIPIDEMIA TYPE: ICD-10-CM

## 2023-08-04 DIAGNOSIS — R20.0 NUMBNESS OF LEFT FOOT: ICD-10-CM

## 2023-08-04 DIAGNOSIS — M10.031 IDIOPATHIC GOUT OF RIGHT WRIST, UNSPECIFIED CHRONICITY: ICD-10-CM

## 2023-08-04 DIAGNOSIS — M51.36 DDD (DEGENERATIVE DISC DISEASE), LUMBAR: ICD-10-CM

## 2023-08-04 DIAGNOSIS — M48.061 NEURAL FORAMINAL STENOSIS OF LUMBAR SPINE: ICD-10-CM

## 2023-08-04 DIAGNOSIS — R73.03 PRE-DIABETES: ICD-10-CM

## 2023-08-04 DIAGNOSIS — I10 ESSENTIAL HYPERTENSION: ICD-10-CM

## 2023-08-04 DIAGNOSIS — E66.01 CLASS 3 SEVERE OBESITY DUE TO EXCESS CALORIES WITH SERIOUS COMORBIDITY AND BODY MASS INDEX (BMI) OF 45.0 TO 49.9 IN ADULT: ICD-10-CM

## 2023-08-04 DIAGNOSIS — M1A.0710 IDIOPATHIC CHRONIC GOUT OF RIGHT FOOT WITHOUT TOPHUS: ICD-10-CM

## 2023-08-04 DIAGNOSIS — G47.33 OSA (OBSTRUCTIVE SLEEP APNEA): ICD-10-CM

## 2023-08-04 DIAGNOSIS — Z12.5 SCREENING FOR PROSTATE CANCER: ICD-10-CM

## 2023-08-04 DIAGNOSIS — Z09 HOSPITAL DISCHARGE FOLLOW-UP: Primary | ICD-10-CM

## 2023-08-04 LAB
COMPLEXED PSA SERPL-MCNC: 0.65 NG/ML (ref 0–4)
URATE SERPL-MCNC: 6.3 MG/DL (ref 3.4–7)

## 2023-08-04 PROCEDURE — 36415 COLL VENOUS BLD VENIPUNCTURE: CPT | Mod: PO | Performed by: FAMILY MEDICINE

## 2023-08-04 PROCEDURE — 84153 ASSAY OF PSA TOTAL: CPT | Performed by: FAMILY MEDICINE

## 2023-08-04 PROCEDURE — 1159F MED LIST DOCD IN RCRD: CPT | Mod: CPTII,S$GLB,, | Performed by: FAMILY MEDICINE

## 2023-08-04 PROCEDURE — 1160F PR REVIEW ALL MEDS BY PRESCRIBER/CLIN PHARMACIST DOCUMENTED: ICD-10-PCS | Mod: CPTII,S$GLB,, | Performed by: FAMILY MEDICINE

## 2023-08-04 PROCEDURE — 3044F HG A1C LEVEL LT 7.0%: CPT | Mod: CPTII,S$GLB,, | Performed by: FAMILY MEDICINE

## 2023-08-04 PROCEDURE — 1159F PR MEDICATION LIST DOCUMENTED IN MEDICAL RECORD: ICD-10-PCS | Mod: CPTII,S$GLB,, | Performed by: FAMILY MEDICINE

## 2023-08-04 PROCEDURE — 3008F PR BODY MASS INDEX (BMI) DOCUMENTED: ICD-10-PCS | Mod: CPTII,S$GLB,, | Performed by: FAMILY MEDICINE

## 2023-08-04 PROCEDURE — 99214 OFFICE O/P EST MOD 30 MIN: CPT | Mod: S$GLB,,, | Performed by: FAMILY MEDICINE

## 2023-08-04 PROCEDURE — 99999 PR PBB SHADOW E&M-EST. PATIENT-LVL IV: ICD-10-PCS | Mod: PBBFAC,,, | Performed by: FAMILY MEDICINE

## 2023-08-04 PROCEDURE — 3078F PR MOST RECENT DIASTOLIC BLOOD PRESSURE < 80 MM HG: ICD-10-PCS | Mod: CPTII,S$GLB,, | Performed by: FAMILY MEDICINE

## 2023-08-04 PROCEDURE — 84550 ASSAY OF BLOOD/URIC ACID: CPT | Performed by: FAMILY MEDICINE

## 2023-08-04 PROCEDURE — 1160F RVW MEDS BY RX/DR IN RCRD: CPT | Mod: CPTII,S$GLB,, | Performed by: FAMILY MEDICINE

## 2023-08-04 PROCEDURE — 3075F PR MOST RECENT SYSTOLIC BLOOD PRESS GE 130-139MM HG: ICD-10-PCS | Mod: CPTII,S$GLB,, | Performed by: FAMILY MEDICINE

## 2023-08-04 PROCEDURE — 3044F PR MOST RECENT HEMOGLOBIN A1C LEVEL <7.0%: ICD-10-PCS | Mod: CPTII,S$GLB,, | Performed by: FAMILY MEDICINE

## 2023-08-04 PROCEDURE — 99214 PR OFFICE/OUTPT VISIT, EST, LEVL IV, 30-39 MIN: ICD-10-PCS | Mod: S$GLB,,, | Performed by: FAMILY MEDICINE

## 2023-08-04 PROCEDURE — 3075F SYST BP GE 130 - 139MM HG: CPT | Mod: CPTII,S$GLB,, | Performed by: FAMILY MEDICINE

## 2023-08-04 PROCEDURE — 99999 PR PBB SHADOW E&M-EST. PATIENT-LVL IV: CPT | Mod: PBBFAC,,, | Performed by: FAMILY MEDICINE

## 2023-08-04 PROCEDURE — 3078F DIAST BP <80 MM HG: CPT | Mod: CPTII,S$GLB,, | Performed by: FAMILY MEDICINE

## 2023-08-04 PROCEDURE — 3008F BODY MASS INDEX DOCD: CPT | Mod: CPTII,S$GLB,, | Performed by: FAMILY MEDICINE

## 2023-08-04 NOTE — PROGRESS NOTES
"Assessment & Plan:    Hospital discharge follow-up    Numbness of left foot  Hospital course reviewed with patient. Offered to schedule MRI brain and give BZD beforehand but patient declined.     DDD (degenerative disc disease), lumbar  Neural foraminal stenosis of lumbar spine  Discussed imaging result in the context of patient's numbness. He declines interventional treatments and prefers not to take pills. Briefly discussed the benefit of PT.     Essential hypertension  BP is improving with compliance with antihypertensive.     SHARAN (obstructive sleep apnea)  Continue mgmt per Pulmonary.    Hyperlipidemia, unspecified hyperlipidemia type  Pre-diabetes  Class 3 severe obesity due to excess calories with serious comorbidity and body mass index (BMI) of 45.0 to 49.9 in adult  Idiopathic gout of right wrist, unspecified chronicity  Patient has resumed all medications and plans to keep a healthier diet and lifestyle.  Uric acid and PSA to be performed. All other routine labs were done in the hospital.        Follow-up: Follow up in about 6 months (around 2/4/2024).  ______________________________________________________________________    Chief Complaint  Chief Complaint   Patient presents with    Hospital Follow Up       Reagan Carbajal is a 63 y.o. male with medical diagnoses as listed in the medical history and problem list that presents to the office to follow up on recent hospitalization for left lower extremity numbness on 7/21-7/24. The HPI and hospital course were summarized as below:    "Briefly, Patient is a 63-year-old male who presented to the [facility] Emergency Department for evaluation of left lower extremity numbness. During his stay, patient was evaluated by Neurology, and CT head was unrevealing, with carotid ultrasound showing no flow-limiting stenoses. MRI lumbar spine was obtained, which showed left paracentral disc extrusion L3-L4, and patient reported that he did not want " "neurosurgical evaluation at this time and would prefer expectant management as he had no alarm symptoms otherwise. Patient was unable to tolerate MRI brain secondary to claustrophobia, and patient was thereafter recommended for repeat CT head per Neurology. Additionally during this time, transthoracic echocardiogram was obtained, which initially showed ejection fraction 25%, however thereafter patient underwent stress testing which showed no evidence of ischemia or infarct and gated may be study showed intact ejection fraction. Patient was recommended for 30 day event monitor as well. Patient thereafter reported that he had to leave the hospital and refused to stay for CT head per Neurology. Patient opted to leave against medical advice, with the understanding that patient was at risk for significant morbidity up to and including death, and opted to leave AMA. Patient was recommended for close follow-up with PCP."    Today, patient states that he is feeling better since discharge. He continues to experience numbness at the bottom of his foot which he attributes to the MRI results. He admits to occasional LBP but overall this is not significant. Denies weakness of the L LE or difficulty ambulating. He states that he does not believe he had a stroke and declines MRI. He also does not want to do anything interventional for his back. He states that he has restarted all of his medications and generally feels better. He lost 9 lbs since his last visit in March.       PAST MEDICAL HISTORY:  Past Medical History:   Diagnosis Date    Anxiety     Colon polyp     Colon polyp     CPAP (continuous positive airway pressure) dependence     does not use    GERD (gastroesophageal reflux disease)     Gout 9/13/2018    HTN (hypertension) 11/12/2012    Hyperlipidemia     Insomnia with sleep apnea     Obesity, morbid, BMI 40.0-49.9 6/12/2014       PAST SURGICAL HISTORY:  Past Surgical History:   Procedure Laterality Date    COLONOSCOPY " N/A 5/3/2016    Procedure: COLONOSCOPY;  Surgeon: Chance Glynn MD;  Location: [facility] ([address] [place] [address]);  Service: Endoscopy;  Laterality: N/A;    COLONOSCOPY W/ POLYPECTOMY         SOCIAL HISTORY:  Social History     Socioeconomic History    Marital status:    Occupational History     Employer: [employer]   Tobacco Use    Smoking status: Former     Current packs/day: 0.00     Types: Cigarettes, Cigars     Quit date: 5/3/1993     Years since quittin.2    Smokeless tobacco: Never   Substance and Sexual Activity    Alcohol use: No     Alcohol/week: 0.0 standard drinks of alcohol    Drug use: No    Sexual activity: Yes     Partners: Female     Birth control/protection: None       FAMILY HISTORY:  Family History   Problem Relation Age of Onset    Heart disease Mother     Hyperlipidemia Mother     Hypertension Mother     Lymphoma Mother     Hypertension Father     Hepatitis Brother        ALLERGIES AND MEDICATIONS: updated and reviewed.  Review of patient's allergies indicates:   Allergen Reactions    Diovan [valsartan]      Ringing in the ears    Lisinopril Swelling    Tramadol      Nausea      Triamterene      Current Outpatient Medications   Medication Sig Dispense Refill    allopurinoL (ZYLOPRIM) 100 MG tablet Take 2 tablets (200 mg total) by mouth once daily. 180 tablet 0    ALPRAZolam (XANAX) 1 MG tablet Take 1 tablet (1 mg total) by mouth nightly as needed. 30 tablet 5    amLODIPine (NORVASC) 10 MG tablet Take 1 tablet (10 mg total) by mouth once daily. 90 tablet 3    aspirin (ECOTRIN) 81 MG EC tablet Take 81 mg by mouth once daily.      atorvastatin (LIPITOR) 40 MG tablet Take 1 tablet (40 mg total) by mouth once daily. 90 tablet 3    betamethasone acetate-betamethasone sodium phosphate (CELESTONE) 6 mg/mL injection       cetirizine (ZYRTEC) 10 MG tablet Take 1 tablet (10 mg total) by mouth 2 (two) times daily. for 7 days 90 tablet 3    chlorthalidone (HYGROTEN) 25 MG Tab Take 1 tablet (25 mg total) by  mouth once daily. 90 tablet 3    colchicine (MITIGARE) 0.6 mg Cap Take 1.2 mg po at the onset of a gout flare, then 0.6 mg po 1 hour thereafter for max dose of 1.8 mg a day. 30 capsule 1    EPINEPHrine 0.15 mg/0.15 mL AtIn Inject 0.3 mLs (0.3 mg total) into the muscle as needed (Administer into outer aspect of thigh. May repeat one time in 15 minutes if symptoms persist). 1 Device 0    famotidine (PEPCID) 40 MG tablet Take 1 tablet (40 mg total) by mouth once daily. 180 tablet 3    hyoscyamine (ANASPAZ,LEVSIN) 0.125 mg Tab Take 1 tablet (125 mcg total) by mouth every 6 (six) hours as needed. 40 tablet 2    indomethacin (INDOCIN) 50 MG capsule TAKE 1 CAPSULE THREE TIMES DAILY AS NEEDED FOR GOUT FLARES (STOP AS SOON AS FLARE IMPROVED) 90 capsule 0    FLUARIX QUAD 8061-6682, PF, 60 mcg (15 mcg x 4)/0.5 mL Syrg        No current facility-administered medications for this visit.         ROS  Review of Systems   Constitutional:  Negative for activity change and unexpected weight change.   Musculoskeletal:  Positive for back pain.   Neurological:  Positive for numbness. Negative for facial asymmetry, speech difficulty and weakness.           Physical Exam  Vitals:    08/04/23 0921 08/04/23 0957   BP: (!) 158/56 138/78   BP Location: Right arm    Patient Position: Sitting    BP Method: Large (Manual)    Pulse: 60    Temp: 98.1 °F (36.7 °C)    TempSrc: Oral    SpO2: 97%    Weight: (!) 151.2 kg (333 lb 5.4 oz)    Height: 6' (1.829 m)     Body mass index is 45.21 kg/m².  Weight: (!) 151.2 kg (333 lb 5.4 oz)   Height: 6' (182.9 cm)   Physical Exam  Constitutional:       General: He is not in acute distress.     Appearance: He is obese.   HENT:      Head: Normocephalic and atraumatic.   Skin:     General: Skin is warm and dry.   Neurological:      Mental Status: He is alert.   Psychiatric:         Mood and Affect: Mood normal.         Behavior: Behavior normal.

## 2023-08-05 ENCOUNTER — PATIENT MESSAGE (OUTPATIENT)
Dept: FAMILY MEDICINE | Facility: CLINIC | Age: 63
End: 2023-08-05
Payer: MEDICARE

## 2023-08-05 DIAGNOSIS — E78.5 HYPERLIPIDEMIA, UNSPECIFIED HYPERLIPIDEMIA TYPE: ICD-10-CM

## 2023-08-05 DIAGNOSIS — R73.03 PRE-DIABETES: ICD-10-CM

## 2023-08-05 DIAGNOSIS — I10 ESSENTIAL HYPERTENSION: Primary | ICD-10-CM

## 2023-08-05 DIAGNOSIS — M10.031 IDIOPATHIC GOUT OF RIGHT WRIST, UNSPECIFIED CHRONICITY: ICD-10-CM

## 2023-08-05 NOTE — TELEPHONE ENCOUNTER
Patient was informed of result(s) via [facility].  Please schedule fasting labs before f/u in 6 mo.

## 2023-08-16 ENCOUNTER — CLINICAL SUPPORT (OUTPATIENT)
Dept: ENDOSCOPY | Facility: HOSPITAL | Age: 63
End: 2023-08-16
Attending: FAMILY MEDICINE
Payer: MEDICARE

## 2023-08-16 ENCOUNTER — TELEPHONE (OUTPATIENT)
Dept: ENDOSCOPY | Facility: HOSPITAL | Age: 63
End: 2023-08-16

## 2023-08-16 ENCOUNTER — TELEPHONE (OUTPATIENT)
Dept: PULMONOLOGY | Facility: CLINIC | Age: 63
End: 2023-08-16
Payer: MEDICARE

## 2023-08-16 VITALS — BODY MASS INDEX: 42.66 KG/M2 | WEIGHT: 315 LBS | HEIGHT: 72 IN

## 2023-08-16 DIAGNOSIS — Z12.11 COLON CANCER SCREENING: ICD-10-CM

## 2023-08-16 RX ORDER — POLYETHYLENE GLYCOL 3350, SODIUM SULFATE ANHYDROUS, SODIUM BICARBONATE, SODIUM CHLORIDE, POTASSIUM CHLORIDE 236; 22.74; 6.74; 5.86; 2.97 G/4L; G/4L; G/4L; G/4L; G/4L
4 POWDER, FOR SOLUTION ORAL ONCE
Qty: 4000 ML | Refills: 0 | Status: SHIPPED | OUTPATIENT
Start: 2023-08-16 | End: 2023-08-16

## 2023-08-16 NOTE — TELEPHONE ENCOUNTER
Spoke to pt to schedule procedure(s) Colonoscopy       Physician to perform procedure(s) Dr. NIYAH Glynn  Date of Procedure (s) 11/30/23  Arrival Time 9:30 AM  Time of Procedure(s) 10:30 AM   Location of Procedure(s) [address]  Type of Rx Prep sent to patient: PEG  Instructions provided to patient via [facility]    Patient was informed on the following information and verbalized understanding. Screening questionnaire reviewed with patient and complete. If procedure requires anesthesia, a responsible adult needs to be present to accompany the patient home, patient cannot drive after receiving anesthesia. Appointment details are tentative, especially check-in time. Patient will receive a prep-op call 4 days prior to confirm check-in time for procedure. If applicable the patient should contact their pharmacy to verify Rx for procedure prep is ready for pick-up. Patient was advised to call the scheduling department at [phone] if pharmacy states no Rx is available. Patient was advised to call the endoscopy scheduling department if any questions or concerns arise.      [facility] Scheduling Department

## 2023-08-16 NOTE — TELEPHONE ENCOUNTER
Left message to call [facility].    AYE Lan  Pulm/Sleep [facility]  [phone]                     ----- Message from Edyta Corea MA sent at 8/15/2023 10:58 AM CDT -----  Keena Martinez Staff  Caller: Unspecified (Today, 10:52 AM)  Name of Who is Calling: Reagan             What is the request in detail: Patient is requesting a call back to get the status of CPAP machine.             Can the clinic reply by [facility]: No             What Number to [facility]: [phone]

## 2023-08-17 ENCOUNTER — TELEPHONE (OUTPATIENT)
Dept: FAMILY MEDICINE | Facility: CLINIC | Age: 63
End: 2023-08-17
Payer: MEDICARE

## 2023-08-17 DIAGNOSIS — G62.9 PERIPHERAL POLYNEUROPATHY: Primary | ICD-10-CM

## 2023-08-17 NOTE — TELEPHONE ENCOUNTER
----- Message from Charmaine Oviedo MA sent at 8/16/2023 12:50 PM CDT -----  Type: Patient Call Back    Who called: Self    What is the request in detail: pt. Is asking for a referral for podiatry for his neuropathy ..     Can the clinic reply by [facility]?No    Would the patient rather a call back or a response via [facility]? Yes, call     Best call back number: [phone] (home)

## 2023-08-17 NOTE — TELEPHONE ENCOUNTER
Pt stated that he already receive a call about the referral. Pt stated that he was scheduled already.

## 2023-08-28 ENCOUNTER — OFFICE VISIT (OUTPATIENT)
Dept: PODIATRY | Facility: CLINIC | Age: 63
End: 2023-08-28
Payer: MEDICARE

## 2023-08-28 VITALS — HEIGHT: 72 IN | WEIGHT: 315 LBS | BODY MASS INDEX: 42.66 KG/M2

## 2023-08-28 DIAGNOSIS — R20.2 NUMBNESS AND TINGLING OF FOOT: Primary | ICD-10-CM

## 2023-08-28 DIAGNOSIS — R20.0 NUMBNESS AND TINGLING OF FOOT: Primary | ICD-10-CM

## 2023-08-28 DIAGNOSIS — G62.9 PERIPHERAL POLYNEUROPATHY: ICD-10-CM

## 2023-08-28 PROCEDURE — 99999 PR PBB SHADOW E&M-EST. PATIENT-LVL III: ICD-10-PCS | Mod: PBBFAC,,, | Performed by: PODIATRIST

## 2023-08-28 PROCEDURE — 1159F PR MEDICATION LIST DOCUMENTED IN MEDICAL RECORD: ICD-10-PCS | Mod: CPTII,S$GLB,, | Performed by: PODIATRIST

## 2023-08-28 PROCEDURE — 99202 PR OFFICE/OUTPT VISIT, NEW, LEVL II, 15-29 MIN: ICD-10-PCS | Mod: S$GLB,,, | Performed by: PODIATRIST

## 2023-08-28 PROCEDURE — 99202 OFFICE O/P NEW SF 15 MIN: CPT | Mod: S$GLB,,, | Performed by: PODIATRIST

## 2023-08-28 PROCEDURE — 3008F PR BODY MASS INDEX (BMI) DOCUMENTED: ICD-10-PCS | Mod: CPTII,S$GLB,, | Performed by: PODIATRIST

## 2023-08-28 PROCEDURE — 3044F HG A1C LEVEL LT 7.0%: CPT | Mod: CPTII,S$GLB,, | Performed by: PODIATRIST

## 2023-08-28 PROCEDURE — 3008F BODY MASS INDEX DOCD: CPT | Mod: CPTII,S$GLB,, | Performed by: PODIATRIST

## 2023-08-28 PROCEDURE — 99999 PR PBB SHADOW E&M-EST. PATIENT-LVL III: CPT | Mod: PBBFAC,,, | Performed by: PODIATRIST

## 2023-08-28 PROCEDURE — 1159F MED LIST DOCD IN RCRD: CPT | Mod: CPTII,S$GLB,, | Performed by: PODIATRIST

## 2023-08-28 PROCEDURE — 3044F PR MOST RECENT HEMOGLOBIN A1C LEVEL <7.0%: ICD-10-PCS | Mod: CPTII,S$GLB,, | Performed by: PODIATRIST

## 2023-08-28 NOTE — PROGRESS NOTES
Subjective:      Patient ID: Reagan Carbajal is a 63 y.o. male.    Chief Complaint: No chief complaint on file.    Numbness bottom of left foot.  Gradual onset, worsening over past several weeks, aggravated by increased weight bearing, shoe gear, pressure.  No previous medical treatment.  OTC pain med not helping. Denies trauma, surgery.    Relates work up at hospital last year for lumbar disk impinging on the nerve.      Review of Systems   Constitutional: Negative for chills, diaphoresis, fever, malaise/fatigue and night sweats.   Cardiovascular:  Negative for claudication, cyanosis, leg swelling and syncope.   Skin:  Negative for color change, dry skin, nail changes, rash, suspicious lesions and unusual hair distribution.   Musculoskeletal:  Negative for falls, joint pain, joint swelling, muscle cramps, muscle weakness and stiffness.   Gastrointestinal:  Negative for constipation, diarrhea, nausea and vomiting.   Neurological:  Positive for numbness and sensory change. Negative for brief paralysis, disturbances in coordination, focal weakness, paresthesias and tremors.         Objective:      Physical Exam  Constitutional:       General: He is not in acute distress.     Appearance: He is well-developed. He is not diaphoretic.   Cardiovascular:      Pulses:           Popliteal pulses are 2+ on the right side and 2+ on the left side.        Dorsalis pedis pulses are 2+ on the right side and 2+ on the left side.        Posterior tibial pulses are 2+ on the right side and 2+ on the left side.      Comments: Capillary refill 3 seconds all toes/distal feet, all toes/both feet warm to touch.      Negative lymphadenopathy bilateral popliteal fossa and tarsal tunnel.      Negavie lower extremity edema bilateral.    Musculoskeletal:      Right ankle: No swelling, deformity, ecchymosis or lacerations. Normal range of motion. Normal pulse.      Right Achilles Tendon: Normal. No defects. Tavares's test negative.      Comments:  Normal angle, base, station of gait. All ten toes without clubbing, cyanosis, or signs of ischemia.  No pain to palpation bilateral lower extremities.  Range of motion, stability, muscle strength, and muscle tone normal bilateral feet and legs.    Lymphadenopathy:      Lower Body: No right inguinal adenopathy. No left inguinal adenopathy.      Comments: Negative lymphadenopathy bilateral popliteal fossa and tarsal tunnel.    Negative lymphangitic streaking bilateral feet/ankles/legs.   Skin:     General: Skin is warm and dry.      Capillary Refill: Capillary refill takes 2 to 3 seconds.      Coloration: Skin is not pale.      Findings: No abrasion, bruising, burn, ecchymosis, erythema, laceration, lesion or rash.      Nails: There is no clubbing.      Comments:   .normdem     Neurological:      Mental Status: He is alert and oriented to person, place, and time.      Sensory: No sensory deficit.      Motor: No tremor, atrophy or abnormal muscle tone.      Gait: Gait normal.      Comments: LOPS plantar left foot to 10 g monofilament.    Otherwise, Negative tinel sign to percussion sural, superficial peroneal, deep peroneal, saphenous, and posterior tibial nerves right and left ankles and feet.     Psychiatric:         Behavior: Behavior is cooperative.           Assessment:       Encounter Diagnoses   Name Primary?    Peripheral polyneuropathy     Numbness and tingling of foot Yes         Plan:       Diagnoses and all orders for this visit:    Numbness and tingling of foot    Peripheral polyneuropathy  -     Ambulatory referral/consult to Podiatry      I counseled the patient on his conditions, their implications and medical management.    Inspect feet multiple times daily for signs of occurrence/recurrence ulceration.     Discussed that there is no treatment for numbness.      Declines consultation from PM&R, neurology, pain management     Discussed conservative treatment with shoes of adequate dimensions,  material, and style to alleviate symptoms and delay or prevent surgical intervention.              Follow up if symptoms worsen or fail to improve.

## 2023-09-13 ENCOUNTER — TELEPHONE (OUTPATIENT)
Dept: FAMILY MEDICINE | Facility: CLINIC | Age: 63
End: 2023-09-13
Payer: MEDICARE

## 2023-09-13 NOTE — TELEPHONE ENCOUNTER
----- Message from Gilma Fernández sent at 9/13/2023  2:54 PM CDT -----  Regarding: self [phone]  Who called: self        What is the request in detail: pt stated he's no longer using [facility] as his mailing pharmacy, he's now using [facility]        Can the clinic reply by [facility]? No        Would the patient rather a call back or a response via [facility]? Call back       Best call back number:[phone]

## 2023-09-22 ENCOUNTER — TELEPHONE (OUTPATIENT)
Dept: SURGERY | Facility: CLINIC | Age: 63
End: 2023-09-22
Payer: MEDICARE

## 2023-11-24 ENCOUNTER — TELEPHONE (OUTPATIENT)
Dept: ENDOSCOPY | Facility: HOSPITAL | Age: 63
End: 2023-11-24
Payer: MEDICARE

## 2023-11-28 ENCOUNTER — TELEPHONE (OUTPATIENT)
Dept: ENDOSCOPY | Facility: HOSPITAL | Age: 63
End: 2023-11-28
Payer: MEDICARE

## 2023-11-28 DIAGNOSIS — Z12.11 SCREENING FOR COLON CANCER: Primary | ICD-10-CM

## 2023-11-28 NOTE — TELEPHONE ENCOUNTER
----- Message from Emily Nguyen sent at 11/28/2023  8:19 AM CST -----    ----- Message -----  From: Karthik Negrete MA  Sent: 11/27/2023   5:56 PM CST  To: [facility] Schedul[facility]      ----- Message -----  From: Kathy Campbell  Sent: 11/27/2023   8:03 AM CST  To: [facility] Staff    .Type: Patient Call Back    Who called: Self     What is the request in detail:Thinks he's coming down with the flu. Would like to reschedule his Colonoscopy    Can the clinic reply by [facility]? No     Would the patient rather a call back or a response via [facility]? Call Back     Best call back number:.[phone] (home)       Additional Information:

## 2023-11-28 NOTE — TELEPHONE ENCOUNTER
Spoke to pt to reschedule procedure(s) Colonoscopy       Physician to perform procedure(s) Dr. OC Wen   Date of Procedure (s) 03/21/24  Arrival Time 11:30 AM  Time of Procedure(s) 12:30 PM   Location of Procedure(s) [address]  Type of Rx Prep sent to patient: PEG  Instructions provided to patient via [facility]    Patient was informed on the following information and verbalized understanding. Screening questionnaire reviewed with patient and complete. If procedure requires anesthesia, a responsible adult needs to be present to accompany the patient home, patient cannot drive after receiving anesthesia. Appointment details are tentative, especially check-in time. Patient will receive a prep-op call 7 days prior to confirm check-in time for procedure. If applicable the patient should contact their pharmacy to verify Rx for procedure prep is ready for pick-up. Patient was advised to call the scheduling department at [phone] if pharmacy states no Rx is available. Patient was advised to call the endoscopy scheduling department if any questions or concerns arise.      [facility] Scheduling Department

## 2024-01-18 NOTE — PROGRESS NOTES
Pre-visit chart review completed.  Immunizations reviewed and updated.    Patient due for the following    HIV Screening     Shingles Vaccine (1 of 2)    Influenza Vaccine (1)      Patient is scheduled to have HIV testing done.     18-Jan-2024 13:24:45

## 2024-02-16 ENCOUNTER — OFFICE VISIT (OUTPATIENT)
Dept: FAMILY MEDICINE | Facility: CLINIC | Age: 64
End: 2024-02-16
Payer: MEDICARE

## 2024-02-16 ENCOUNTER — LAB VISIT (OUTPATIENT)
Dept: LAB | Facility: HOSPITAL | Age: 64
End: 2024-02-16
Attending: FAMILY MEDICINE
Payer: MEDICARE

## 2024-02-16 VITALS
SYSTOLIC BLOOD PRESSURE: 137 MMHG | HEART RATE: 75 BPM | TEMPERATURE: 98 F | HEIGHT: 72 IN | DIASTOLIC BLOOD PRESSURE: 70 MMHG | OXYGEN SATURATION: 96 % | BODY MASS INDEX: 42.66 KG/M2 | WEIGHT: 315 LBS

## 2024-02-16 DIAGNOSIS — J30.2 SEASONAL ALLERGIES: ICD-10-CM

## 2024-02-16 DIAGNOSIS — R73.03 PRE-DIABETES: ICD-10-CM

## 2024-02-16 DIAGNOSIS — I10 ESSENTIAL HYPERTENSION: ICD-10-CM

## 2024-02-16 DIAGNOSIS — K21.9 GASTROESOPHAGEAL REFLUX DISEASE, UNSPECIFIED WHETHER ESOPHAGITIS PRESENT: ICD-10-CM

## 2024-02-16 DIAGNOSIS — E66.01 CLASS 3 SEVERE OBESITY DUE TO EXCESS CALORIES WITH SERIOUS COMORBIDITY AND BODY MASS INDEX (BMI) OF 45.0 TO 49.9 IN ADULT: Primary | ICD-10-CM

## 2024-02-16 DIAGNOSIS — M10.031 IDIOPATHIC GOUT OF RIGHT WRIST, UNSPECIFIED CHRONICITY: ICD-10-CM

## 2024-02-16 DIAGNOSIS — M10.9 GOUT OF RIGHT WRIST, UNSPECIFIED CAUSE, UNSPECIFIED CHRONICITY: ICD-10-CM

## 2024-02-16 DIAGNOSIS — F51.02 TRANSIENT INSOMNIA: ICD-10-CM

## 2024-02-16 DIAGNOSIS — E78.5 HYPERLIPIDEMIA, UNSPECIFIED HYPERLIPIDEMIA TYPE: ICD-10-CM

## 2024-02-16 DIAGNOSIS — I10 ESSENTIAL HYPERTENSION: Chronic | ICD-10-CM

## 2024-02-16 LAB
ALBUMIN SERPL BCP-MCNC: 4.2 G/DL (ref 3.5–5.2)
ALP SERPL-CCNC: 79 U/L (ref 55–135)
ALT SERPL W/O P-5'-P-CCNC: 22 U/L (ref 10–44)
ANION GAP SERPL CALC-SCNC: 8 MMOL/L (ref 8–16)
AST SERPL-CCNC: 19 U/L (ref 10–40)
BASOPHILS # BLD AUTO: 0.05 K/UL (ref 0–0.2)
BASOPHILS NFR BLD: 0.5 % (ref 0–1.9)
BILIRUB SERPL-MCNC: 0.7 MG/DL (ref 0.1–1)
BUN SERPL-MCNC: 14 MG/DL (ref 8–23)
CALCIUM SERPL-MCNC: 10.2 MG/DL (ref 8.7–10.5)
CHLORIDE SERPL-SCNC: 103 MMOL/L (ref 95–110)
CHOLEST SERPL-MCNC: 173 MG/DL (ref 120–199)
CHOLEST/HDLC SERPL: 5.1 {RATIO} (ref 2–5)
CO2 SERPL-SCNC: 30 MMOL/L (ref 23–29)
CREAT SERPL-MCNC: 0.9 MG/DL (ref 0.5–1.4)
DIFFERENTIAL METHOD BLD: NORMAL
EOSINOPHIL # BLD AUTO: 0.2 K/UL (ref 0–0.5)
EOSINOPHIL NFR BLD: 2.3 % (ref 0–8)
ERYTHROCYTE [DISTWIDTH] IN BLOOD BY AUTOMATED COUNT: 14.5 % (ref 11.5–14.5)
EST. GFR  (NO RACE VARIABLE): >60 ML/MIN/1.73 M^2
ESTIMATED AVG GLUCOSE: 117 MG/DL (ref 68–131)
GLUCOSE SERPL-MCNC: 86 MG/DL (ref 70–110)
HBA1C MFR BLD: 5.7 % (ref 4–5.6)
HCT VFR BLD AUTO: 47.8 % (ref 40–54)
HDLC SERPL-MCNC: 34 MG/DL (ref 40–75)
HDLC SERPL: 19.7 % (ref 20–50)
HGB BLD-MCNC: 15.3 G/DL (ref 14–18)
IMM GRANULOCYTES # BLD AUTO: 0.02 K/UL (ref 0–0.04)
IMM GRANULOCYTES NFR BLD AUTO: 0.2 % (ref 0–0.5)
LDLC SERPL CALC-MCNC: 109.8 MG/DL (ref 63–159)
LYMPHOCYTES # BLD AUTO: 2.6 K/UL (ref 1–4.8)
LYMPHOCYTES NFR BLD: 28 % (ref 18–48)
MCH RBC QN AUTO: 29.4 PG (ref 27–31)
MCHC RBC AUTO-ENTMCNC: 32 G/DL (ref 32–36)
MCV RBC AUTO: 92 FL (ref 82–98)
MONOCYTES # BLD AUTO: 0.9 K/UL (ref 0.3–1)
MONOCYTES NFR BLD: 9.2 % (ref 4–15)
NEUTROPHILS # BLD AUTO: 5.6 K/UL (ref 1.8–7.7)
NEUTROPHILS NFR BLD: 59.8 % (ref 38–73)
NONHDLC SERPL-MCNC: 139 MG/DL
NRBC BLD-RTO: 0 /100 WBC
PLATELET # BLD AUTO: 256 K/UL (ref 150–450)
PMV BLD AUTO: 11.2 FL (ref 9.2–12.9)
POTASSIUM SERPL-SCNC: 4.5 MMOL/L (ref 3.5–5.1)
PROT SERPL-MCNC: 7.6 G/DL (ref 6–8.4)
RBC # BLD AUTO: 5.2 M/UL (ref 4.6–6.2)
SODIUM SERPL-SCNC: 141 MMOL/L (ref 136–145)
TRIGL SERPL-MCNC: 146 MG/DL (ref 30–150)
URATE SERPL-MCNC: 7.8 MG/DL (ref 3.4–7)
WBC # BLD AUTO: 9.37 K/UL (ref 3.9–12.7)

## 2024-02-16 PROCEDURE — 99999 PR PBB SHADOW E&M-EST. PATIENT-LVL IV: CPT | Mod: PBBFAC,,, | Performed by: FAMILY MEDICINE

## 2024-02-16 PROCEDURE — 80053 COMPREHEN METABOLIC PANEL: CPT | Performed by: FAMILY MEDICINE

## 2024-02-16 PROCEDURE — 80061 LIPID PANEL: CPT | Performed by: FAMILY MEDICINE

## 2024-02-16 PROCEDURE — 84550 ASSAY OF BLOOD/URIC ACID: CPT | Performed by: FAMILY MEDICINE

## 2024-02-16 PROCEDURE — 83036 HEMOGLOBIN GLYCOSYLATED A1C: CPT | Performed by: FAMILY MEDICINE

## 2024-02-16 PROCEDURE — 85025 COMPLETE CBC W/AUTO DIFF WBC: CPT | Performed by: FAMILY MEDICINE

## 2024-02-16 PROCEDURE — 36415 COLL VENOUS BLD VENIPUNCTURE: CPT | Mod: PO | Performed by: FAMILY MEDICINE

## 2024-02-16 PROCEDURE — 99214 OFFICE O/P EST MOD 30 MIN: CPT | Mod: S$GLB,,, | Performed by: FAMILY MEDICINE

## 2024-02-16 RX ORDER — ATORVASTATIN CALCIUM 20 MG/1
20 TABLET, FILM COATED ORAL DAILY
Qty: 90 TABLET | Refills: 3 | Status: SHIPPED | OUTPATIENT
Start: 2024-02-16 | End: 2025-02-15

## 2024-02-16 RX ORDER — CETIRIZINE HYDROCHLORIDE 10 MG/1
10 TABLET ORAL 2 TIMES DAILY
Qty: 180 TABLET | Refills: 3 | Status: SHIPPED | OUTPATIENT
Start: 2024-02-16 | End: 2025-02-15

## 2024-02-16 RX ORDER — ATORVASTATIN CALCIUM 40 MG/1
40 TABLET, FILM COATED ORAL DAILY
Qty: 90 TABLET | Refills: 3 | Status: SHIPPED | OUTPATIENT
Start: 2024-02-16 | End: 2024-02-16

## 2024-02-16 RX ORDER — ATORVASTATIN CALCIUM 20 MG/1
20 TABLET, FILM COATED ORAL DAILY
COMMUNITY
End: 2024-02-16 | Stop reason: SDUPTHER

## 2024-02-16 RX ORDER — POLYETHYLENE GLYCOL-3350 AND ELECTROLYTES WITH FLAVOR PACK 240; 5.84; 2.98; 6.72; 22.72 G/278.26G; G/278.26G; G/278.26G; G/278.26G; G/278.26G
4000 POWDER, FOR SOLUTION ORAL ONCE
COMMUNITY
Start: 2023-12-08

## 2024-02-16 RX ORDER — ALLOPURINOL 100 MG/1
200 TABLET ORAL DAILY
Qty: 180 TABLET | Refills: 3 | Status: SHIPPED | OUTPATIENT
Start: 2024-02-16 | End: 2024-02-19 | Stop reason: SDUPTHER

## 2024-02-16 RX ORDER — ALPRAZOLAM 1 MG/1
1 TABLET ORAL NIGHTLY PRN
Qty: 30 TABLET | Refills: 5 | Status: SHIPPED | OUTPATIENT
Start: 2024-02-16

## 2024-02-16 RX ORDER — AMLODIPINE BESYLATE 10 MG/1
10 TABLET ORAL DAILY
Qty: 90 TABLET | Refills: 3 | Status: SHIPPED | OUTPATIENT
Start: 2024-02-16

## 2024-02-16 NOTE — PROGRESS NOTES
Assessment & Plan:    Class 3 severe obesity due to excess calories with serious comorbidity and body mass index (BMI) of 45.0 to 49.9 in adult  -     semaglutide, weight loss, 0.25 mg/0.5 mL PnIj; Inject 0.25 mg into the skin every 7 days.  Dispense: 0.5 mL; Refill: 11    Discussed GLP-1 agonists as an adjunctive therapy for weight loss and the appropriate expectations of this medication. Start Wegovy if covered and available at the pharmacy. He was informed of the risk of GI side effects.  Strongly encouraged patient to limit intake of sweets, try to adhere to a 1500 calorie/day diet, and increase his physical activity.     Essential hypertension  -     amLODIPine (NORVASC) 10 MG tablet; Take 1 tablet (10 mg total) by mouth once daily.  Dispense: 90 tablet; Refill: 3    Reportedly under control at home. Amlodipine refilled.     Hyperlipidemia, unspecified hyperlipidemia type  -     atorvastatin (LIPITOR) 20 MG tablet; Take 1 tablet (20 mg total) by mouth once daily.  Dispense: 90 tablet; Refill: 3    May continue Lipitor at 20 mg. Fasting labs to be scheduled.    Gout of right wrist, unspecified cause, unspecified chronicity  -     allopurinoL (ZYLOPRIM) 100 MG tablet; Take 2 tablets (200 mg total) by mouth once daily.  Dispense: 180 tablet; Refill: 3    Controlled. Medication(s) refilled.     Transient insomnia  -     ALPRAZolam (XANAX) 1 MG tablet; Take 1 tablet (1 mg total) by mouth nightly as needed for Insomnia.  Dispense: 30 tablet; Refill: 5    Controlled. Medication(s) refilled.     Seasonal allergies  -     cetirizine (ZYRTEC) 10 MG tablet; Take 1 tablet (10 mg total) by mouth 2 (two) times daily.  Dispense: 180 tablet; Refill: 3    Controlled. Medication(s) refilled.     Gastroesophageal reflux disease, unspecified whether esophagitis present  Controlled. Medication(s) refilled.       Health maintenance reviewed:  -Colonoscopy scheduled in March    Encouraged RSV, COVID booster, and shingles  vaccines.    Follow-up: Follow up in about 6 months (around 8/16/2024).  ______________________________________________________________________    Chief Complaint  Chief Complaint   Patient presents with    Hypertension       HPI  Reagan Carbajal is a 63 y.o. male with medical diagnoses as listed in the medical history and problem list that presents to the office to follow up on his chronic conditions. He forgot to take his amlodipine today. He reports his last BP was 137/70 after taking his medication. He has been indulging in sweets, causing him to gain some weight. He is interested in an injectable weight loss medication. He does not exercise but keeps an active lifestyle. He has been feeling more winded lately, but attributes this to his weight gain. Stress test was normal in July. Echo normal in 2021. Requesting refills of his medications. He states that he has been taking 1/2 tablet Lipitor because of the tinnitus with a full dose.     Health Maintenance         Date Due Completion Date    Shingles Vaccine (1 of 2) Never done ---    RSV Vaccine (Age 60+ and Pregnant patients) (1 - 1-dose 60+ series) Never done ---    Colorectal Cancer Screening 05/03/2021 5/3/2016    COVID-19 Vaccine (4 - 2023-24 season) 09/01/2023 11/12/2021    Hemoglobin A1c (Prediabetes) 07/21/2024 7/21/2023    TETANUS VACCINE 08/02/2026 8/2/2016    Lipid Panel 07/21/2028 7/21/2023              PAST MEDICAL HISTORY:  Past Medical History:   Diagnosis Date    Anxiety     Colon polyp     Colon polyp     CPAP (continuous positive airway pressure) dependence     does not use    GERD (gastroesophageal reflux disease)     Gout 9/13/2018    HTN (hypertension) 11/12/2012    Hyperlipidemia     Insomnia with sleep apnea     Obesity, morbid, BMI 40.0-49.9 6/12/2014       PAST SURGICAL HISTORY:  Past Surgical History:   Procedure Laterality Date    COLONOSCOPY N/A 5/3/2016    Procedure: COLONOSCOPY;  Surgeon: Chance Glynn MD;  Location: [address]  [facility]);  Service: Endoscopy;  Laterality: N/A;    COLONOSCOPY W/ POLYPECTOMY         SOCIAL HISTORY:  Social History     Socioeconomic History    Marital status:    Occupational History     Employer: [employer]   Tobacco Use    Smoking status: Former     Current packs/day: 0.00     Types: Cigarettes, Cigars     Quit date: 5/3/1993     Years since quittin.8    Smokeless tobacco: Never   Substance and Sexual Activity    Alcohol use: No     Alcohol/week: 0.0 standard drinks of alcohol    Drug use: No    Sexual activity: Yes     Partners: Female     Birth control/protection: None       FAMILY HISTORY:  Family History   Problem Relation Age of Onset    Heart disease Mother     Hyperlipidemia Mother     Hypertension Mother     Lymphoma Mother     Hypertension Father     Hepatitis Brother        ALLERGIES AND MEDICATIONS: updated and reviewed.  Review of patient's allergies indicates:   Allergen Reactions    Diovan [valsartan]      Ringing in the ears    Lisinopril Swelling    Tramadol      Nausea      Triamterene      Current Outpatient Medications   Medication Sig Dispense Refill    aspirin (ECOTRIN) 81 MG EC tablet Take 81 mg by mouth once daily.      chlorthalidone (HYGROTEN) 25 MG Tab Take 1 tablet (25 mg total) by mouth once daily. 90 tablet 3    colchicine (MITIGARE) 0.6 mg Cap Take 1.2 mg po at the onset of a gout flare, then 0.6 mg po 1 hour thereafter for max dose of 1.8 mg a day. 30 capsule 1    famotidine (PEPCID) 40 MG tablet Take 1 tablet (40 mg total) by mouth once daily. 180 tablet 3    indomethacin (INDOCIN) 50 MG capsule TAKE 1 CAPSULE THREE TIMES DAILY AS NEEDED FOR GOUT FLARES (STOP AS SOON AS FLARE IMPROVED) 90 capsule 0    allopurinoL (ZYLOPRIM) 100 MG tablet Take 2 tablets (200 mg total) by mouth once daily. 180 tablet 3    ALPRAZolam (XANAX) 1 MG tablet Take 1 tablet (1 mg total) by mouth nightly as needed for Insomnia. 30 tablet 5    amLODIPine (NORVASC) 10 MG tablet Take 1 tablet  (10 mg total) by mouth once daily. 90 tablet 3    atorvastatin (LIPITOR) 20 MG tablet Take 1 tablet (20 mg total) by mouth once daily. 90 tablet 3    betamethasone acetate-betamethasone sodium phosphate (CELESTONE) 6 mg/mL injection       cetirizine (ZYRTEC) 10 MG tablet Take 1 tablet (10 mg total) by mouth 2 (two) times daily. 180 tablet 3    FLUARIX QUAD 9587-4484, PF, 60 mcg (15 mcg x 4)/0.5 mL Syrg       GAVILYTE-C 240-22.72-6.72 -5.84 gram SolR Take 4,000 mLs by mouth once.      semaglutide, weight loss, 0.25 mg/0.5 mL PnIj Inject 0.25 mg into the skin every 7 days. 0.5 mL 11     No current facility-administered medications for this visit.         ROS  Review of Systems   Constitutional:  Positive for unexpected weight change. Negative for activity change and appetite change.   Respiratory:  Positive for shortness of breath.    Cardiovascular:  Negative for chest pain and leg swelling.   Musculoskeletal:  Positive for arthralgias (gout flares).   Psychiatric/Behavioral:  Negative for dysphoric mood. The patient is not nervous/anxious.            Physical Exam  Vitals:    02/16/24 1023 02/16/24 1042   BP: (!) 150/80 137/70   BP Location: Right arm    Patient Position: Sitting    BP Method: Large (Manual)    Pulse: 75    Temp: 97.9 °F (36.6 °C)    TempSrc: Oral    SpO2: 96%    Weight: (!) 156.1 kg (344 lb 0.5 oz)    Height: 6' (1.829 m)     Body mass index is 46.66 kg/m².  Weight: (!) 156.1 kg (344 lb 0.5 oz)   Height: 6' (182.9 cm)   Physical Exam  Constitutional:       General: He is not in acute distress.     Appearance: He is obese.   HENT:      Head: Normocephalic and atraumatic.   Neck:      Thyroid: No thyromegaly.      Vascular: No carotid bruit.   Cardiovascular:      Rate and Rhythm: Normal rate and regular rhythm.      Pulses: Normal pulses.      Heart sounds: Normal heart sounds.   Pulmonary:      Effort: Pulmonary effort is normal. No respiratory distress.      Breath sounds: Normal breath sounds.    Musculoskeletal:      Cervical back: Neck supple.      Right lower leg: No edema.      Left lower leg: No edema.   Lymphadenopathy:      Cervical: No cervical adenopathy.   Skin:     General: Skin is warm and dry.      Findings: No rash.   Neurological:      General: No focal deficit present.      Mental Status: He is alert and oriented to person, place, and time.   Psychiatric:         Mood and Affect: Mood normal.         Behavior: Behavior normal.         Thought Content: Thought content normal.

## 2024-02-19 ENCOUNTER — PATIENT MESSAGE (OUTPATIENT)
Dept: FAMILY MEDICINE | Facility: CLINIC | Age: 64
End: 2024-02-19
Payer: MEDICARE

## 2024-02-19 DIAGNOSIS — Z12.5 SCREENING FOR PROSTATE CANCER: ICD-10-CM

## 2024-02-19 DIAGNOSIS — M10.9 GOUT OF RIGHT WRIST, UNSPECIFIED CAUSE, UNSPECIFIED CHRONICITY: ICD-10-CM

## 2024-02-19 DIAGNOSIS — R73.03 PRE-DIABETES: Primary | ICD-10-CM

## 2024-02-19 DIAGNOSIS — E78.5 HYPERLIPIDEMIA, UNSPECIFIED HYPERLIPIDEMIA TYPE: ICD-10-CM

## 2024-02-19 RX ORDER — ALLOPURINOL 100 MG/1
300 TABLET ORAL DAILY
Qty: 270 TABLET | Refills: 3 | Status: SHIPPED | OUTPATIENT
Start: 2024-02-19 | End: 2025-02-18

## 2024-02-19 NOTE — TELEPHONE ENCOUNTER
Please inform patient that his labs are stable except his uric acid is elevated. Need to increase allopurinol to 300 mg daily. A1c is stable in pre-diabetes range. Cholesterol has improved significantly since last check.

## 2024-02-21 ENCOUNTER — TELEPHONE (OUTPATIENT)
Dept: FAMILY MEDICINE | Facility: CLINIC | Age: 64
End: 2024-02-21
Payer: MEDICARE

## 2024-02-21 NOTE — TELEPHONE ENCOUNTER
----- Message from Roldan Hua sent at 2/21/2024 11:16 AM CST -----  Regarding: [facility]  Type: Patient Call Back    [facility]    What is the request in detail:calling for the PA of semaglutide, weight loss, 0.25 mg/0.5 mL PnIj    Can the clinic reply by [facility]?no    Would the patient rather a call back or a response via [facility]? callback    Best call back number:[phone]    Additional Information:

## 2024-02-21 NOTE — TELEPHONE ENCOUNTER
Returned call to pt, pt informed that PA for Semaglutide was denied by his insurance. Pt informed that PCP will be notified.

## 2024-02-21 NOTE — TELEPHONE ENCOUNTER
Pt informed that PCP states pt can schedule a VV to discuss other options for wt management. Pt states she will call back to schedule.

## 2024-03-04 ENCOUNTER — TELEPHONE (OUTPATIENT)
Dept: FAMILY MEDICINE | Facility: CLINIC | Age: 64
End: 2024-03-04
Payer: MEDICARE

## 2024-03-04 NOTE — TELEPHONE ENCOUNTER
----- Message from Ryne Ventura sent at 3/4/2024  8:42 AM CST -----  Regarding: [facility] [phone]  Type:  Pharmacy Calling to Clarify an RX    Name of Caller:  [facility]     Pharmacy Name:    [facility] [address] [address]  Phone: [phone] Fax: [phone]     Prescription Name:  semaglutide, weight loss, 0.25 mg/0.5 mL PnIj    What do they need to clarify?  Needs prior auth     Can you be contacted via [facility]? No     Best Call Back Number:  [phone]     Additional Information:

## 2024-03-11 ENCOUNTER — TELEPHONE (OUTPATIENT)
Dept: ENDOSCOPY | Facility: HOSPITAL | Age: 64
End: 2024-03-11
Payer: MEDICARE

## 2024-03-11 NOTE — TELEPHONE ENCOUNTER
Spoke with pt.  He states he never started the Ozempic as his insurance would not approve it.  Flagged for removal.

## 2024-03-14 ENCOUNTER — TELEPHONE (OUTPATIENT)
Dept: ENDOSCOPY | Facility: HOSPITAL | Age: 64
End: 2024-03-14
Payer: MEDICARE

## 2024-03-14 NOTE — TELEPHONE ENCOUNTER
Left voicemail and sent portal message for patient to call Endoscopy Scheduling to review instructions and confirm appointment for Colonoscopy on 3/21/24.

## 2024-03-19 ENCOUNTER — TELEPHONE (OUTPATIENT)
Dept: ENDOSCOPY | Facility: HOSPITAL | Age: 64
End: 2024-03-19
Payer: MEDICARE

## 2024-03-19 NOTE — TELEPHONE ENCOUNTER
Left 2nd voicemail for patient to call Endoscopy Scheduling to review instructions and confirm appointment for Colonoscopy on 3/21/24.

## 2024-08-20 ENCOUNTER — OFFICE VISIT (OUTPATIENT)
Dept: FAMILY MEDICINE | Facility: CLINIC | Age: 64
End: 2024-08-20
Payer: MEDICARE

## 2024-08-20 VITALS
SYSTOLIC BLOOD PRESSURE: 130 MMHG | WEIGHT: 315 LBS | DIASTOLIC BLOOD PRESSURE: 68 MMHG | BODY MASS INDEX: 47.12 KG/M2 | OXYGEN SATURATION: 97 % | TEMPERATURE: 98 F | HEART RATE: 82 BPM

## 2024-08-20 DIAGNOSIS — I10 ESSENTIAL HYPERTENSION: Primary | ICD-10-CM

## 2024-08-20 DIAGNOSIS — Z23 PNEUMOCOCCAL VACCINATION GIVEN: ICD-10-CM

## 2024-08-20 DIAGNOSIS — M10.9 GOUT OF RIGHT WRIST, UNSPECIFIED CAUSE, UNSPECIFIED CHRONICITY: ICD-10-CM

## 2024-08-20 DIAGNOSIS — R73.03 PRE-DIABETES: ICD-10-CM

## 2024-08-20 DIAGNOSIS — E78.5 HYPERLIPIDEMIA, UNSPECIFIED HYPERLIPIDEMIA TYPE: ICD-10-CM

## 2024-08-20 DIAGNOSIS — K21.9 GASTROESOPHAGEAL REFLUX DISEASE, UNSPECIFIED WHETHER ESOPHAGITIS PRESENT: ICD-10-CM

## 2024-08-20 DIAGNOSIS — M17.0 PRIMARY OSTEOARTHRITIS OF BOTH KNEES: ICD-10-CM

## 2024-08-20 DIAGNOSIS — Z13.6 SCREENING FOR AAA (ABDOMINAL AORTIC ANEURYSM): ICD-10-CM

## 2024-08-20 DIAGNOSIS — E66.01 CLASS 3 SEVERE OBESITY DUE TO EXCESS CALORIES WITH SERIOUS COMORBIDITY AND BODY MASS INDEX (BMI) OF 45.0 TO 49.9 IN ADULT: ICD-10-CM

## 2024-08-20 PROCEDURE — 3008F BODY MASS INDEX DOCD: CPT | Mod: CPTII,S$GLB,, | Performed by: FAMILY MEDICINE

## 2024-08-20 PROCEDURE — 1101F PT FALLS ASSESS-DOCD LE1/YR: CPT | Mod: CPTII,S$GLB,, | Performed by: FAMILY MEDICINE

## 2024-08-20 PROCEDURE — 99214 OFFICE O/P EST MOD 30 MIN: CPT | Mod: S$GLB,,, | Performed by: FAMILY MEDICINE

## 2024-08-20 PROCEDURE — 3044F HG A1C LEVEL LT 7.0%: CPT | Mod: CPTII,S$GLB,, | Performed by: FAMILY MEDICINE

## 2024-08-20 PROCEDURE — 1160F RVW MEDS BY RX/DR IN RCRD: CPT | Mod: CPTII,S$GLB,, | Performed by: FAMILY MEDICINE

## 2024-08-20 PROCEDURE — 3288F FALL RISK ASSESSMENT DOCD: CPT | Mod: CPTII,S$GLB,, | Performed by: FAMILY MEDICINE

## 2024-08-20 PROCEDURE — G0009 ADMIN PNEUMOCOCCAL VACCINE: HCPCS | Mod: S$GLB,,, | Performed by: FAMILY MEDICINE

## 2024-08-20 PROCEDURE — G2211 COMPLEX E/M VISIT ADD ON: HCPCS | Mod: S$GLB,,, | Performed by: FAMILY MEDICINE

## 2024-08-20 PROCEDURE — 3075F SYST BP GE 130 - 139MM HG: CPT | Mod: CPTII,S$GLB,, | Performed by: FAMILY MEDICINE

## 2024-08-20 PROCEDURE — 99999 PR PBB SHADOW E&M-EST. PATIENT-LVL IV: CPT | Mod: PBBFAC,,, | Performed by: FAMILY MEDICINE

## 2024-08-20 PROCEDURE — 3078F DIAST BP <80 MM HG: CPT | Mod: CPTII,S$GLB,, | Performed by: FAMILY MEDICINE

## 2024-08-20 PROCEDURE — 1159F MED LIST DOCD IN RCRD: CPT | Mod: CPTII,S$GLB,, | Performed by: FAMILY MEDICINE

## 2024-08-20 PROCEDURE — 90677 PCV20 VACCINE IM: CPT | Mod: S$GLB,,, | Performed by: FAMILY MEDICINE

## 2024-08-20 RX ORDER — OMEPRAZOLE 40 MG/1
40 CAPSULE, DELAYED RELEASE ORAL DAILY
Qty: 90 CAPSULE | Refills: 3 | Status: SHIPPED | OUTPATIENT
Start: 2024-08-20 | End: 2025-08-20

## 2024-08-20 RX ORDER — CHLORTHALIDONE 25 MG/1
25 TABLET ORAL DAILY
Qty: 90 TABLET | Refills: 3 | Status: SHIPPED | OUTPATIENT
Start: 2024-08-20

## 2024-08-20 NOTE — PATIENT INSTRUCTIONS
You are due for the RSV and shingles vaccines.     Rescheduled your colonoscopy.     You can try chondroitin for joint pain.

## 2024-08-20 NOTE — PROGRESS NOTES
Assessment & Plan:    Essential hypertension  -     chlorthalidone (HYGROTEN) 25 MG Tab; Take 1 tablet (25 mg total) by mouth once daily.  Dispense: 90 tablet; Refill: 3    Controlled. Medication(s) refilled.     Pre-diabetes  Hyperlipidemia, unspecified hyperlipidemia type  Fasting labs to be scheduled.    Class 3 severe obesity due to excess calories with serious comorbidity and body mass index (BMI) of 45.0 to 49.9 in adult  Primary osteoarthritis of both knees  OA is mild. Patient's weight is contributing to his knee pain.     Gout of right wrist, unspecified cause, unspecified chronicity  Advised to resume allopurinol for gout prophylaxis.      Gastroesophageal reflux disease, unspecified whether esophagitis present  -     omeprazole (PRILOSEC) 40 MG capsule; Take 1 capsule (40 mg total) by mouth once daily.  Dispense: 90 capsule; Refill: 3    Controlled. Medication(s) refilled.     Screening for AAA (abdominal aortic aneurysm)  -     US Abdominal Aorta; Future; Expected date: 08/20/2024    Pneumococcal vaccination given  -     (VFC) PCV20 (Prevnar 20) IM vaccine (>/= 6 wks)      Health maintenance reviewed:  -Colonoscopy to be rescheduled.    Follow-up: Follow up in about 6 months (around 2/20/2025).  ______________________________________________________________________    Chief Complaint  Chief Complaint   Patient presents with    Health Maintenance     6 month follow up       HPI  Reagan Carbajal is a 64 y.o. male with medical diagnoses as listed in the medical history and problem list that presents to the office to follow up on his chronic conditions. Requesting refill of chlorthalidone and Prilosec. Patient has not been taking allopurinol and denies any gout flares since his last visit. He has chronic b/l knee pain, exacerbated by his weight.     Health Maintenance         Date Due Completion Date    Shingles Vaccine (1 of 2) Never done ---    RSV Vaccine (Age 60+ and Pregnant patients) (1 - 1-dose 60+  series) Never done ---    Colorectal Cancer Screening 2021 5/3/2016    COVID-19 Vaccine ( season) 2023    Abdominal Aortic Aneurysm Screening 2024    Hemoglobin A1c (Prediabetes) 2025    TETANUS VACCINE 2026    Lipid Panel 2029              PAST MEDICAL HISTORY:  Past Medical History:   Diagnosis Date    Anxiety     Colon polyp     Colon polyp     CPAP (continuous positive airway pressure) dependence     does not use    GERD (gastroesophageal reflux disease)     Gout 2018    HTN (hypertension) 2012    Hyperlipidemia     Insomnia with sleep apnea     Obesity, morbid, BMI 40.0-49.9 2014       PAST SURGICAL HISTORY:  Past Surgical History:   Procedure Laterality Date    COLONOSCOPY N/A 5/3/2016    Procedure: COLONOSCOPY;  Surgeon: Chance Glynn MD;  Location: [facility] ([address] [place] [address]);  Service: Endoscopy;  Laterality: N/A;    COLONOSCOPY W/ POLYPECTOMY         SOCIAL HISTORY:  Social History     Socioeconomic History    Marital status:    Occupational History     Employer: [employer]   Tobacco Use    Smoking status: Former     Current packs/day: 0.00     Types: Cigarettes, Cigars     Quit date: 5/3/1993     Years since quittin.3    Smokeless tobacco: Never   Substance and Sexual Activity    Alcohol use: No     Alcohol/week: 0.0 standard drinks of alcohol    Drug use: No    Sexual activity: Yes     Partners: Female     Birth control/protection: None       FAMILY HISTORY:  Family History   Problem Relation Name Age of Onset    Heart disease Mother 79     Hyperlipidemia Mother 79     Hypertension Mother 79     Lymphoma Mother 79     Hypertension Father 82     Hepatitis Brother 66        ALLERGIES AND MEDICATIONS: updated and reviewed.  Review of patient's allergies indicates:   Allergen Reactions    Diovan [valsartan]      Ringing in the ears    Lisinopril Swelling    Tramadol      Nausea       Triamterene      Current Outpatient Medications   Medication Sig Dispense Refill    allopurinoL (ZYLOPRIM) 100 MG tablet Take 3 tablets (300 mg total) by mouth once daily. 270 tablet 3    ALPRAZolam (XANAX) 1 MG tablet Take 1 tablet (1 mg total) by mouth nightly as needed for Insomnia. 30 tablet 5    amLODIPine (NORVASC) 10 MG tablet Take 1 tablet (10 mg total) by mouth once daily. 90 tablet 3    aspirin (ECOTRIN) 81 MG EC tablet Take 81 mg by mouth once daily.      atorvastatin (LIPITOR) 20 MG tablet Take 1 tablet (20 mg total) by mouth once daily. 90 tablet 3    betamethasone acetate-betamethasone sodium phosphate (CELESTONE) 6 mg/mL injection       cetirizine (ZYRTEC) 10 MG tablet Take 1 tablet (10 mg total) by mouth 2 (two) times daily. 180 tablet 3    colchicine (MITIGARE) 0.6 mg Cap Take 1.2 mg po at the onset of a gout flare, then 0.6 mg po 1 hour thereafter for max dose of 1.8 mg a day. 30 capsule 1    FLUARIX QUAD 4210-3670, PF, 60 mcg (15 mcg x 4)/0.5 mL Syrg       GAVILYTE-C 240-22.72-6.72 -5.84 gram SolR Take 4,000 mLs by mouth once.      indomethacin (INDOCIN) 50 MG capsule TAKE 1 CAPSULE THREE TIMES DAILY AS NEEDED FOR GOUT FLARES (STOP AS SOON AS FLARE IMPROVED) 90 capsule 0    chlorthalidone (HYGROTEN) 25 MG Tab Take 1 tablet (25 mg total) by mouth once daily. 90 tablet 3    omeprazole (PRILOSEC) 40 MG capsule Take 1 capsule (40 mg total) by mouth once daily. 90 capsule 3     No current facility-administered medications for this visit.         ROS  Review of Systems   Constitutional:  Negative for activity change.   Musculoskeletal:  Positive for arthralgias.           Physical Exam  Vitals:    08/20/24 1047   BP: 130/68   Pulse: 82   Temp: 97.8 °F (36.6 °C)   TempSrc: Oral   SpO2: 97%   Weight: (!) 157.6 kg (347 lb 7.1 oz)    Body mass index is 47.12 kg/m².  Weight: (!) 157.6 kg (347 lb 7.1 oz)       Physical Exam  Constitutional:       General: He is not in acute distress.     Appearance: He is  obese.   HENT:      Head: Normocephalic and atraumatic.   Neck:      Thyroid: No thyromegaly.      Vascular: No carotid bruit.   Cardiovascular:      Rate and Rhythm: Normal rate and regular rhythm.      Pulses: Normal pulses.      Heart sounds: Normal heart sounds.   Pulmonary:      Effort: Pulmonary effort is normal. No respiratory distress.      Breath sounds: Normal breath sounds.   Musculoskeletal:      Cervical back: Neck supple.      Right lower leg: No edema.      Left lower leg: No edema.   Lymphadenopathy:      Cervical: No cervical adenopathy.   Skin:     General: Skin is warm and dry.      Findings: No rash.   Neurological:      General: No focal deficit present.      Mental Status: He is alert and oriented to person, place, and time.   Psychiatric:         Mood and Affect: Mood normal.         Behavior: Behavior normal.         Thought Content: Thought content normal.

## 2024-08-23 ENCOUNTER — LAB VISIT (OUTPATIENT)
Dept: LAB | Facility: HOSPITAL | Age: 64
End: 2024-08-23
Attending: FAMILY MEDICINE
Payer: MEDICARE

## 2024-08-23 ENCOUNTER — PATIENT MESSAGE (OUTPATIENT)
Dept: FAMILY MEDICINE | Facility: CLINIC | Age: 64
End: 2024-08-23
Payer: MEDICARE

## 2024-08-23 DIAGNOSIS — E78.5 HYPERLIPIDEMIA, UNSPECIFIED HYPERLIPIDEMIA TYPE: ICD-10-CM

## 2024-08-23 DIAGNOSIS — R73.03 PRE-DIABETES: ICD-10-CM

## 2024-08-23 DIAGNOSIS — M10.9 GOUT OF RIGHT WRIST, UNSPECIFIED CAUSE, UNSPECIFIED CHRONICITY: ICD-10-CM

## 2024-08-23 DIAGNOSIS — I10 ESSENTIAL HYPERTENSION: Primary | ICD-10-CM

## 2024-08-23 DIAGNOSIS — Z12.5 SCREENING FOR PROSTATE CANCER: ICD-10-CM

## 2024-08-23 LAB
ALBUMIN SERPL BCP-MCNC: 3.8 G/DL (ref 3.5–5.2)
ALP SERPL-CCNC: 86 U/L (ref 55–135)
ALT SERPL W/O P-5'-P-CCNC: 20 U/L (ref 10–44)
ANION GAP SERPL CALC-SCNC: 8 MMOL/L (ref 8–16)
AST SERPL-CCNC: 20 U/L (ref 10–40)
BASOPHILS # BLD AUTO: 0.04 K/UL (ref 0–0.2)
BASOPHILS NFR BLD: 0.5 % (ref 0–1.9)
BILIRUB SERPL-MCNC: 0.7 MG/DL (ref 0.1–1)
BUN SERPL-MCNC: 15 MG/DL (ref 8–23)
CALCIUM SERPL-MCNC: 9.6 MG/DL (ref 8.7–10.5)
CHLORIDE SERPL-SCNC: 103 MMOL/L (ref 95–110)
CHOLEST SERPL-MCNC: 196 MG/DL (ref 120–199)
CHOLEST/HDLC SERPL: 5.9 {RATIO} (ref 2–5)
CO2 SERPL-SCNC: 30 MMOL/L (ref 23–29)
COMPLEXED PSA SERPL-MCNC: 0.62 NG/ML (ref 0–4)
CREAT SERPL-MCNC: 0.9 MG/DL (ref 0.5–1.4)
DIFFERENTIAL METHOD BLD: ABNORMAL
EOSINOPHIL # BLD AUTO: 0.2 K/UL (ref 0–0.5)
EOSINOPHIL NFR BLD: 2.7 % (ref 0–8)
ERYTHROCYTE [DISTWIDTH] IN BLOOD BY AUTOMATED COUNT: 14.6 % (ref 11.5–14.5)
EST. GFR  (NO RACE VARIABLE): >60 ML/MIN/1.73 M^2
ESTIMATED AVG GLUCOSE: 120 MG/DL (ref 68–131)
GLUCOSE SERPL-MCNC: 104 MG/DL (ref 70–110)
HBA1C MFR BLD: 5.8 % (ref 4–5.6)
HCT VFR BLD AUTO: 46.6 % (ref 40–54)
HDLC SERPL-MCNC: 33 MG/DL (ref 40–75)
HDLC SERPL: 16.8 % (ref 20–50)
HGB BLD-MCNC: 15.1 G/DL (ref 14–18)
IMM GRANULOCYTES # BLD AUTO: 0.03 K/UL (ref 0–0.04)
IMM GRANULOCYTES NFR BLD AUTO: 0.4 % (ref 0–0.5)
LDLC SERPL CALC-MCNC: 134 MG/DL (ref 63–159)
LYMPHOCYTES # BLD AUTO: 2.3 K/UL (ref 1–4.8)
LYMPHOCYTES NFR BLD: 29 % (ref 18–48)
MCH RBC QN AUTO: 29.6 PG (ref 27–31)
MCHC RBC AUTO-ENTMCNC: 32.4 G/DL (ref 32–36)
MCV RBC AUTO: 91 FL (ref 82–98)
MONOCYTES # BLD AUTO: 0.7 K/UL (ref 0.3–1)
MONOCYTES NFR BLD: 9.5 % (ref 4–15)
NEUTROPHILS # BLD AUTO: 4.5 K/UL (ref 1.8–7.7)
NEUTROPHILS NFR BLD: 57.9 % (ref 38–73)
NONHDLC SERPL-MCNC: 163 MG/DL
NRBC BLD-RTO: 0 /100 WBC
PLATELET # BLD AUTO: 237 K/UL (ref 150–450)
PMV BLD AUTO: 10.9 FL (ref 9.2–12.9)
POTASSIUM SERPL-SCNC: 4.4 MMOL/L (ref 3.5–5.1)
PROT SERPL-MCNC: 7.1 G/DL (ref 6–8.4)
RBC # BLD AUTO: 5.1 M/UL (ref 4.6–6.2)
SODIUM SERPL-SCNC: 141 MMOL/L (ref 136–145)
TRIGL SERPL-MCNC: 145 MG/DL (ref 30–150)
URATE SERPL-MCNC: 8.3 MG/DL (ref 3.4–7)
WBC # BLD AUTO: 7.75 K/UL (ref 3.9–12.7)

## 2024-08-23 PROCEDURE — 80053 COMPREHEN METABOLIC PANEL: CPT | Performed by: FAMILY MEDICINE

## 2024-08-23 PROCEDURE — 85025 COMPLETE CBC W/AUTO DIFF WBC: CPT | Performed by: FAMILY MEDICINE

## 2024-08-23 PROCEDURE — 83036 HEMOGLOBIN GLYCOSYLATED A1C: CPT | Performed by: FAMILY MEDICINE

## 2024-08-23 PROCEDURE — 84550 ASSAY OF BLOOD/URIC ACID: CPT | Performed by: FAMILY MEDICINE

## 2024-08-23 PROCEDURE — 84153 ASSAY OF PSA TOTAL: CPT | Performed by: FAMILY MEDICINE

## 2024-08-23 PROCEDURE — 80061 LIPID PANEL: CPT | Performed by: FAMILY MEDICINE

## 2024-08-23 NOTE — TELEPHONE ENCOUNTER
Patient was informed of result(s) via [facility]. Please contact them to schedule fasting labs before his next follow up visit.

## 2024-08-23 NOTE — TELEPHONE ENCOUNTER
Attempted to contact pt to schedule lab appt, no answer, left VM instructing pt to read [employer] message or return call to clinic.

## 2024-09-05 DIAGNOSIS — F51.02 TRANSIENT INSOMNIA: ICD-10-CM

## 2024-09-05 DIAGNOSIS — Z79.899 CONTROLLED SUBSTANCE AGREEMENT SIGNED: Primary | ICD-10-CM

## 2024-09-05 RX ORDER — ALPRAZOLAM 1 MG/1
1 TABLET ORAL NIGHTLY
Qty: 30 TABLET | Refills: 0 | Status: SHIPPED | OUTPATIENT
Start: 2024-09-05

## 2024-09-05 NOTE — TELEPHONE ENCOUNTER
No care due was identified.  [facility] Embedded Care Due Messages. Reference number: [id number].   9/05/2024 4:06:28 PM CDT

## 2024-10-13 DIAGNOSIS — Z79.899 CONTROLLED SUBSTANCE AGREEMENT SIGNED: ICD-10-CM

## 2024-10-13 DIAGNOSIS — F51.02 TRANSIENT INSOMNIA: ICD-10-CM

## 2024-10-13 NOTE — TELEPHONE ENCOUNTER
No care due was identified.  [facility] Embedded Care Due Messages. Reference number: [id number].   10/13/2024 10:04:06 AM CDT

## 2024-10-14 RX ORDER — ALPRAZOLAM 1 MG/1
1 TABLET ORAL NIGHTLY
Qty: 30 TABLET | Refills: 0 | OUTPATIENT
Start: 2024-10-14

## 2024-10-14 NOTE — TELEPHONE ENCOUNTER
Attempted to contact pt to schedule UDS, no answer, left VM instructing pt to read [employer] message or return call to clinic.

## 2024-11-20 ENCOUNTER — OFFICE VISIT (OUTPATIENT)
Dept: FAMILY MEDICINE | Facility: CLINIC | Age: 64
End: 2024-11-20
Payer: MEDICARE

## 2024-11-20 ENCOUNTER — TELEPHONE (OUTPATIENT)
Dept: FAMILY MEDICINE | Facility: CLINIC | Age: 64
End: 2024-11-20
Payer: MEDICARE

## 2024-11-20 VITALS
BODY MASS INDEX: 41.75 KG/M2 | WEIGHT: 315 LBS | HEART RATE: 80 BPM | OXYGEN SATURATION: 96 % | SYSTOLIC BLOOD PRESSURE: 138 MMHG | TEMPERATURE: 99 F | HEIGHT: 73 IN | DIASTOLIC BLOOD PRESSURE: 80 MMHG

## 2024-11-20 DIAGNOSIS — Z12.11 COLON CANCER SCREENING: ICD-10-CM

## 2024-11-20 DIAGNOSIS — M54.50 CHRONIC BILATERAL LOW BACK PAIN WITHOUT SCIATICA: ICD-10-CM

## 2024-11-20 DIAGNOSIS — E66.813 CLASS 3 SEVERE OBESITY DUE TO EXCESS CALORIES WITH SERIOUS COMORBIDITY AND BODY MASS INDEX (BMI) OF 45.0 TO 49.9 IN ADULT: ICD-10-CM

## 2024-11-20 DIAGNOSIS — I10 ESSENTIAL HYPERTENSION: ICD-10-CM

## 2024-11-20 DIAGNOSIS — G89.29 CHRONIC BILATERAL LOW BACK PAIN WITHOUT SCIATICA: ICD-10-CM

## 2024-11-20 DIAGNOSIS — K21.9 GASTROESOPHAGEAL REFLUX DISEASE, UNSPECIFIED WHETHER ESOPHAGITIS PRESENT: ICD-10-CM

## 2024-11-20 DIAGNOSIS — E66.01 CLASS 3 SEVERE OBESITY DUE TO EXCESS CALORIES WITH SERIOUS COMORBIDITY AND BODY MASS INDEX (BMI) OF 45.0 TO 49.9 IN ADULT: ICD-10-CM

## 2024-11-20 DIAGNOSIS — R10.9 ABDOMINAL PAIN, UNSPECIFIED ABDOMINAL LOCATION: Primary | ICD-10-CM

## 2024-11-20 PROCEDURE — 99999 PR PBB SHADOW E&M-EST. PATIENT-LVL V: CPT | Mod: PBBFAC,,,

## 2024-11-20 NOTE — TELEPHONE ENCOUNTER
----- Message from Palmira sent at 11/18/2024  4:05 PM CST -----  Regarding: call back  Type: Patient Call Back    Who called: pt     What is the request in detail: requesting orders for labs to be performed prior to upcoming appt with FNP on Wednesday, says he is experiencing intermittent abdominal pain that began 4 days ago     Can the clinic reply by [facility]?no    Would the patient rather a call back or a response via [facility]? call    Best call back number: [phone]     Additional Information:

## 2024-11-20 NOTE — TELEPHONE ENCOUNTER
Call was placed to patient and a voicemail was left for a return call to clinic in regard to today's visit.

## 2024-11-20 NOTE — PROGRESS NOTES
HPI     Reagan Carbajal is a 64 y.o. male with multiple medical diagnoses as listed in the medical history and problem list that presents for abdominal pain. PCP Dr. Campbell with last visit in this clinic on 8/20/24.       Chief Complaint   Patient presents with    Abdominal Pain     Patient has been gradually noticing pain across his lower stomach and around his lower back. 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 The pain comes and goes. He denies fever, no nausea/vomiting. He takes prilosec for GERD but this feels different. Feels relief when he burps or passes gas. Has history of kidney stones. Takes allopurinol for gout. No changes in bowel habits. Denies hematuria or blood in stool. Occasional burning with urination.     Has started intermittent fasting -- eating one meal/day for the last 5 days. Trying to lose weight. He says this has helped the abdominal and back pain.     HPI        Assessment & Plan     1. Abdominal pain, unspecified abdominal location    Abdomen soft, non-tender, non-distended. No CVA or flank tenderness. Mild mid epigastric tenderness. No suprapubic tenderness. Mild TTP to right groin area, no hernia or lumps noted. No indications of acute abdomen. Afebrile, VSS in clinic. Will check CBC/CMP and urinalysis today. Recommended continued prilosec and OTC Gas-X/Maalox for gas. If symptoms persist or worsen, will obtain CT abdomen.   Follow up with clinic for any worsening symptoms, or if symptoms fail to improve.       - CBC Auto Differential; Future  - COMPREHENSIVE METABOLIC PANEL; Future  - Urinalysis, Reflex to Urine Culture Urine, Clean Catch; Future    2. Chronic bilateral low back pain without sciatica    No TTP of lumbar spine. Full ROM noted. States pain comes and goes. Encouraged continued stretching, weight loss, OTC Tylenol and ibuprofen for pain. Follow up with clinic for any worsening symptoms, or if symptoms fail to  improve.       3. Essential hypertension    Stable on amlodipine, chlorthalidone. The current medical regimen is effective;  continue present plan and medications. Would like referral to Cardiology for follow up, has been in the past but not since August 2023. Follow up with clinic for any worsening symptoms, or if symptoms fail to improve.       BP Readings from Last 3 Encounters:   11/20/24 138/80   08/20/24 130/68   02/16/24 137/70     -continue current medication regimen  -DASH diet, regular cardiovascular exercises, portion control  -weight loss  - Ambulatory referral/consult to Cardiology; Future    4. Class 3 severe obesity due to excess calories with serious comorbidity and body mass index (BMI) of 45.0 to 49.9 in adult    Discussed weight loss, if having good success with intermittent fasting. -discussed regular physical exercises/healthy nutrition and food choices.   -stressed to patient about the importance of portion control to help keep cholesterol/BP/BG levels in check  -advised moderate consumption of alcohol and benefits of avoiding tobacco use/substance abuse.    Obesity  Today's date: 11/21/2024  Wt Readings from Last 4 Encounters:   11/20/24 (!) 152.3 kg (335 lb 13.9 oz)   08/20/24 (!) 157.6 kg (347 lb 7.1 oz)   02/16/24 (!) 156.1 kg (344 lb 0.5 oz)   08/28/23 (!) 151 kg (333 lb)     Body mass index is 44.31 kg/m².    The patient is asked to make an attempt to improve diet and exercise patterns   Diet and exercise planning discussed.  Recommend 30 minutes of moderate intensity exercise 5 days/week.  Recommend exercise videos on youtube:[email or web address].    5. Gastroesophageal reflux disease, unspecified whether esophagitis present    Stable on prilosec daily. The current medical regimen is effective;  continue present plan and medications.  -stable, discussed with patient about avoiding potential dietary triggers  -avoid spicy/greasy/sour/acidic foods, as well as tea/coffee/chocolate if  possible  -Tylenol as needed for pain, avoid NSAIDs  -Keep food diary      6. Colon cancer screening    - Ambulatory referral/consult to Endo Procedure  **ONLY ORDER FOR [facility]**; Future      Discussed DDx, condition, and treatment.   Education sent to patient portal/included in after visit summary.  ED precautions given.   Notify provider if symptoms do not resolve or increase in severity.   Patient verbalizes understanding and agrees with plan of care.  -----------------------------------------      Health Maintenance:  Health Maintenance         Date Due Completion Date    Shingles Vaccine (1 of 2) Never done ---    RSV Vaccine (Age 60+ and Pregnant patients) (1 - Risk 60-74 years 1-dose series) Never done ---    Colorectal Cancer Screening 05/03/2021 5/3/2016    Abdominal Aortic Aneurysm Screening 07/03/2024 9/20/2018    COVID-19 Vaccine (4 - 2024-25 season) 09/01/2024 11/12/2021    Hemoglobin A1c (Prediabetes) 08/23/2025 8/23/2024    TETANUS VACCINE 08/02/2026 8/2/2016    Lipid Panel 08/23/2029 8/23/2024            Discussed the importance of overdue vaccines which were offered during this encounter. Patient declined overdue vaccines at this time and Advised patient on the importance of completing overdue health maintenance items    Follow Up:  No follow-ups on file.    Exam     Review of Systems:  (as noted above)  Review of Systems   Constitutional:  Negative for activity change, appetite change, fatigue and fever.   HENT:  Negative for trouble swallowing.    Respiratory:  Negative for shortness of breath.    Cardiovascular:  Negative for chest pain.   Gastrointestinal:  Positive for abdominal pain. Negative for blood in stool, constipation, diarrhea, nausea, rectal pain and vomiting.   Genitourinary:  Negative for difficulty urinating, frequency and hematuria.   Musculoskeletal:  Positive for back pain.       Physical Exam:   Physical Exam  Constitutional:       General:  "He is not in acute distress.     Appearance: Normal appearance. He is obese. He is not ill-appearing.   HENT:      Head: Normocephalic and atraumatic.   Cardiovascular:      Rate and Rhythm: Normal rate and regular rhythm.      Pulses: Normal pulses.      Heart sounds: Normal heart sounds. No murmur heard.  Pulmonary:      Effort: Pulmonary effort is normal. No respiratory distress.      Breath sounds: Normal breath sounds. No wheezing.   Abdominal:      General: Abdomen is flat. Bowel sounds are normal. There is no distension.      Palpations: Abdomen is soft.      Tenderness: There is abdominal tenderness. There is no right CVA tenderness, left CVA tenderness, guarding or rebound.   Musculoskeletal:         General: Normal range of motion.      Lumbar back: No swelling, deformity or tenderness. Normal range of motion.   Skin:     General: Skin is warm and dry.      Capillary Refill: Capillary refill takes less than 2 seconds.   Neurological:      General: No focal deficit present.      Mental Status: He is alert and oriented to person, place, and time.   Psychiatric:         Mood and Affect: Mood normal.         Behavior: Behavior normal.       Vitals:    11/20/24 1608   BP: 138/80   BP Location: Right arm   Patient Position: Sitting   Pulse: 80   Temp: 98.8 °F (37.1 °C)   TempSrc: Oral   SpO2: 96%   Weight: (!) 152.3 kg (335 lb 13.9 oz)   Height: 6' 1" (1.854 m)      Body mass index is 44.31 kg/m².        History     Past Medical History:  Past Medical History:   Diagnosis Date    Anxiety     Colon polyp     Colon polyp     CPAP (continuous positive airway pressure) dependence     does not use    GERD (gastroesophageal reflux disease)     Gout 9/13/2018    HTN (hypertension) 11/12/2012    Hyperlipidemia     Insomnia with sleep apnea     Obesity, morbid, BMI 40.0-49.9 6/12/2014       Past Surgical History:  Past Surgical History:   Procedure Laterality Date    COLONOSCOPY N/A 5/3/2016    Procedure: COLONOSCOPY;  " Surgeon: Chance Glynn MD;  Location: [facility] ([address] [place] [address]);  Service: Endoscopy;  Laterality: N/A;    COLONOSCOPY W/ POLYPECTOMY         Social History:  Social History     Socioeconomic History    Marital status:    Occupational History     Employer: [employer]   Tobacco Use    Smoking status: Former     Current packs/day: 0.00     Types: Cigarettes, Cigars     Quit date: 5/3/1993     Years since quittin.5    Smokeless tobacco: Never   Substance and Sexual Activity    Alcohol use: No     Alcohol/week: 0.0 standard drinks of alcohol    Drug use: No    Sexual activity: Yes     Partners: Female     Birth control/protection: None       Family History:  Family History   Problem Relation Name Age of Onset    Heart disease Mother 79     Hyperlipidemia Mother 79     Hypertension Mother 79     Lymphoma Mother 79     Hypertension Father 82     Hepatitis Brother 66        Allergies and Medications: (updated and reviewed)  Review of patient's allergies indicates:   Allergen Reactions    Diovan [valsartan]      Ringing in the ears    Lisinopril Swelling    Tramadol      Nausea      Triamterene      Current Outpatient Medications   Medication Sig Dispense Refill    allopurinoL (ZYLOPRIM) 100 MG tablet Take 3 tablets (300 mg total) by mouth once daily. 270 tablet 3    ALPRAZolam (XANAX) 1 MG tablet TAKE 1 TABLET BY MOUTH NIGHTLY AS NEEDED FOR INSOMNIA 30 tablet 0    amLODIPine (NORVASC) 10 MG tablet Take 1 tablet (10 mg total) by mouth once daily. 90 tablet 3    aspirin (ECOTRIN) 81 MG EC tablet Take 81 mg by mouth once daily.      atorvastatin (LIPITOR) 20 MG tablet Take 1 tablet (20 mg total) by mouth once daily. 90 tablet 3    betamethasone acetate-betamethasone sodium phosphate (CELESTONE) 6 mg/mL injection       cetirizine (ZYRTEC) 10 MG tablet Take 1 tablet (10 mg total) by mouth 2 (two) times daily. 180 tablet 3    chlorthalidone (HYGROTEN) 25 MG Tab Take 1 tablet (25 mg total) by mouth once daily. 90 tablet  3    colchicine (MITIGARE) 0.6 mg Cap Take 1.2 mg po at the onset of a gout flare, then 0.6 mg po 1 hour thereafter for max dose of 1.8 mg a day. 30 capsule 1    FLUARIX QUAD 2192-8785, PF, 60 mcg (15 mcg x 4)/0.5 mL Syrg       GAVILYTE-C 240-22.72-6.72 -5.84 gram SolR Take 4,000 mLs by mouth once.      indomethacin (INDOCIN) 50 MG capsule TAKE 1 CAPSULE THREE TIMES DAILY AS NEEDED FOR GOUT FLARES (STOP AS SOON AS FLARE IMPROVED) 90 capsule 0    omeprazole (PRILOSEC) 40 MG capsule Take 1 capsule (40 mg total) by mouth once daily. 90 capsule 3     No current facility-administered medications for this visit.       Patient Care Team:  Madeline Campbell DO as PCP - General (Family Medicine)  Yandy Whitt LPN as Care Coordinator         - The patient is given an After Visit Summary that lists all medications with directions, allergies, education, orders placed during this encounter and follow-up instructions.      - I have reviewed the patient's medical information including past medical, family, and social history sections including the medications and allergies.      - We discussed the patient's current medications.     This note was created by combination of typed  and MModal dictation.  Transcription errors may be present.  If there are any questions, please contact me.                 NATHANIEL Moore

## 2024-11-21 ENCOUNTER — LAB VISIT (OUTPATIENT)
Dept: LAB | Facility: HOSPITAL | Age: 64
End: 2024-11-21
Payer: MEDICARE

## 2024-11-21 DIAGNOSIS — R10.9 ABDOMINAL PAIN, UNSPECIFIED ABDOMINAL LOCATION: ICD-10-CM

## 2024-11-21 LAB
ALBUMIN SERPL BCP-MCNC: 4.2 G/DL (ref 3.5–5.2)
ALP SERPL-CCNC: 74 U/L (ref 40–150)
ALT SERPL W/O P-5'-P-CCNC: 35 U/L (ref 10–44)
ANION GAP SERPL CALC-SCNC: 11 MMOL/L (ref 8–16)
AST SERPL-CCNC: 30 U/L (ref 10–40)
BASOPHILS # BLD AUTO: 0.06 K/UL (ref 0–0.2)
BASOPHILS NFR BLD: 0.8 % (ref 0–1.9)
BILIRUB SERPL-MCNC: 0.8 MG/DL (ref 0.1–1)
BUN SERPL-MCNC: 18 MG/DL (ref 8–23)
CALCIUM SERPL-MCNC: 9.6 MG/DL (ref 8.7–10.5)
CHLORIDE SERPL-SCNC: 106 MMOL/L (ref 95–110)
CO2 SERPL-SCNC: 27 MMOL/L (ref 23–29)
CREAT SERPL-MCNC: 1.1 MG/DL (ref 0.5–1.4)
DIFFERENTIAL METHOD BLD: NORMAL
EOSINOPHIL # BLD AUTO: 0.3 K/UL (ref 0–0.5)
EOSINOPHIL NFR BLD: 3.3 % (ref 0–8)
ERYTHROCYTE [DISTWIDTH] IN BLOOD BY AUTOMATED COUNT: 14.4 % (ref 11.5–14.5)
EST. GFR  (NO RACE VARIABLE): >60 ML/MIN/1.73 M^2
GLUCOSE SERPL-MCNC: 102 MG/DL (ref 70–110)
HCT VFR BLD AUTO: 44.3 % (ref 40–54)
HGB BLD-MCNC: 14.4 G/DL (ref 14–18)
IMM GRANULOCYTES # BLD AUTO: 0.02 K/UL (ref 0–0.04)
IMM GRANULOCYTES NFR BLD AUTO: 0.3 % (ref 0–0.5)
LYMPHOCYTES # BLD AUTO: 2.3 K/UL (ref 1–4.8)
LYMPHOCYTES NFR BLD: 28.7 % (ref 18–48)
MCH RBC QN AUTO: 29.7 PG (ref 27–31)
MCHC RBC AUTO-ENTMCNC: 32.5 G/DL (ref 32–36)
MCV RBC AUTO: 91 FL (ref 82–98)
MONOCYTES # BLD AUTO: 0.9 K/UL (ref 0.3–1)
MONOCYTES NFR BLD: 11.1 % (ref 4–15)
NEUTROPHILS # BLD AUTO: 4.4 K/UL (ref 1.8–7.7)
NEUTROPHILS NFR BLD: 55.8 % (ref 38–73)
NRBC BLD-RTO: 0 /100 WBC
PLATELET # BLD AUTO: 225 K/UL (ref 150–450)
PMV BLD AUTO: 11.5 FL (ref 9.2–12.9)
POTASSIUM SERPL-SCNC: 4.4 MMOL/L (ref 3.5–5.1)
PROT SERPL-MCNC: 7.3 G/DL (ref 6–8.4)
RBC # BLD AUTO: 4.85 M/UL (ref 4.6–6.2)
SODIUM SERPL-SCNC: 144 MMOL/L (ref 136–145)
WBC # BLD AUTO: 7.87 K/UL (ref 3.9–12.7)

## 2024-11-21 PROCEDURE — 85025 COMPLETE CBC W/AUTO DIFF WBC: CPT

## 2024-11-21 PROCEDURE — 80053 COMPREHEN METABOLIC PANEL: CPT

## 2024-11-21 PROCEDURE — 36415 COLL VENOUS BLD VENIPUNCTURE: CPT | Mod: PO

## 2025-01-03 ENCOUNTER — OFFICE VISIT (OUTPATIENT)
Dept: FAMILY MEDICINE | Facility: CLINIC | Age: 65
End: 2025-01-03
Payer: MEDICARE

## 2025-01-03 VITALS
BODY MASS INDEX: 41.75 KG/M2 | HEIGHT: 73 IN | WEIGHT: 315 LBS | TEMPERATURE: 98 F | HEART RATE: 70 BPM | DIASTOLIC BLOOD PRESSURE: 80 MMHG | SYSTOLIC BLOOD PRESSURE: 140 MMHG | OXYGEN SATURATION: 96 %

## 2025-01-03 DIAGNOSIS — M10.9 ACUTE GOUT OF LEFT WRIST, UNSPECIFIED CAUSE: Primary | ICD-10-CM

## 2025-01-03 DIAGNOSIS — I10 ESSENTIAL HYPERTENSION: Chronic | ICD-10-CM

## 2025-01-03 DIAGNOSIS — M10.9 GOUT, ARTHRITIS: ICD-10-CM

## 2025-01-03 PROCEDURE — 99999 PR PBB SHADOW E&M-EST. PATIENT-LVL IV: CPT | Mod: PBBFAC,,,

## 2025-01-03 RX ORDER — PREDNISONE 20 MG/1
30 TABLET ORAL DAILY
Qty: 8 TABLET | Refills: 0 | Status: SHIPPED | OUTPATIENT
Start: 2025-01-03 | End: 2025-01-08

## 2025-01-03 RX ORDER — COLCHICINE 0.6 MG/1
CAPSULE ORAL
Qty: 30 CAPSULE | Refills: 1 | Status: SHIPPED | OUTPATIENT
Start: 2025-01-03

## 2025-01-03 RX ORDER — INDOMETHACIN 50 MG/1
50 CAPSULE ORAL 3 TIMES DAILY PRN
Qty: 90 CAPSULE | Refills: 0 | Status: SHIPPED | OUTPATIENT
Start: 2025-01-03

## 2025-01-03 NOTE — PATIENT INSTRUCTIONS
Acute gout tx:  Nonsteroidal anti-inflammatory drugs (NSAIDs), corticosteroids, or colchicine are recommended first-line treatments for patients experiencing a gout flare.   NSAID: indomethacin 50mg tid. Only for acute pain, NSAIDs should be taken for the shortest period possible, at lowest effective dose.   Prednisone taper: 30mg x 5 days  Colchicine: 1.2 mg orally initially, followed by 0.6 mg orally after 1 hour, max 1.8 mg total dose. No repeat dose.   Continue allopurinol    Please no additional NSAIDs (ibuprofen) while taking indomethacin. Go to emergency department for fever, increased pain/redness to left wrist, or worsening signs or symptoms.     We can recheck your uric acid in 4 weeks but your levels from August of last year were elevated. Ensure you are taking your allopurinol daily.

## 2025-01-03 NOTE — PROGRESS NOTES
HPI     Chief Complaint:  Chief Complaint   Patient presents with    Gout     In left hand.       Reagan Carbajal is a 64 y.o. male with multiple medical diagnoses as listed in the medical history and problem list that presents for   Chief Complaint   Patient presents with    Gout     In left hand.   .   Patient is not known to me with his last appointment in this department on 11/20/2024.      HPI    Gout x 2 days to left hand, starting eating gumbo over holidays. Took indomethacin last night without much relief. Takes allopurinol daily but admits poor compliance. Indomethacin typically helps. Took colchicine yesterday morning and last night.     Admits poor compliance at home with meds. Did not take meds this morning. Took BP meds last night. Pt reports given intraarticular injection from gout in the past and requesting today. No refer, no skin redness or s/s of joint infection or systemic infection. VSS, afebrile.     Other concerns below    Assessment & Plan       1. Acute gout of left wrist, unspecified cause  Prednisone x 5 days  Indomethacin tid prn  Future labs   Continue allopurinol  Low purine diet  Consider f/u with specialist if symptoms recur or persist      -     predniSONE (DELTASONE) 20 MG tablet; Take 1.5 tablets (30 mg total) by mouth once daily. for 5 days  Dispense: 8 tablet; Refill: 0  -     indomethacin (INDOCIN) 50 MG capsule; Take 1 capsule (50 mg total) by mouth 3 (three) times daily as needed (gout pain).  Dispense: 90 capsule; Refill: 0  -     URIC ACID; Future; Expected date: 02/03/2025  -     BASIC METABOLIC PANEL; Future; Expected date: 02/03/2025    2. Gout, arthritis  -     colchicine (MITIGARE) 0.6 mg Cap; Take 1.2 mg po at the onset of a gout flare, then 0.6 mg po 1 hour thereafter for max dose of 1.8 mg a day.  Dispense: 30 capsule; Refill: 1  -     URIC ACID; Future; Expected date: 02/03/2025  -     BASIC METABOLIC PANEL; Future; Expected date: 02/03/2025    3. Essential  hypertension  Not at goal. Did not take medications today. Encouraged pt to take as prescribed        --------------------------------------------      Health Maintenance:  Health Maintenance         Date Due Completion Date    Shingles Vaccine (1 of 2) Never done ---    RSV Vaccine (Age 60+ and Pregnant patients) (1 - Risk 60-74 years 1-dose series) Never done ---    Colorectal Cancer Screening 05/03/2021 5/3/2016    Abdominal Aortic Aneurysm Screening 07/03/2024 9/20/2018    COVID-19 Vaccine (4 - 2024-25 season) 09/01/2024 11/12/2021    Hemoglobin A1c (Prediabetes) 08/23/2025 8/23/2024    TETANUS VACCINE 08/02/2026 8/2/2016    Lipid Panel 08/23/2029 8/23/2024            Health maintenance reviewed    Follow Up:  No follow-ups on file.    Discussed DDx, condition, and treatment.   Education sent to patient portal/included in after visit summary.  ED precautions given.   Notify provider if symptoms do not resolve or increase in severity.   Patient verbalizes understanding and agrees with plan of care.    Exam     Review of Systems:  (as noted above)  Review of Systems    Physical Exam:   Physical Exam  Constitutional:       General: He is not in acute distress.     Appearance: Normal appearance. He is normal weight. He is not toxic-appearing.   Cardiovascular:      Rate and Rhythm: Normal rate and regular rhythm.      Pulses: Normal pulses.   Pulmonary:      Effort: Pulmonary effort is normal. No respiratory distress.      Breath sounds: Normal breath sounds. No wheezing.   Abdominal:      General: Bowel sounds are normal.      Palpations: Abdomen is soft.   Musculoskeletal:         General: Swelling and tenderness present.      Left wrist: Swelling and tenderness present. Decreased range of motion. Normal pulse.      Cervical back: Normal range of motion and neck supple.   Skin:     General: Skin is warm and dry.      Capillary Refill: Capillary refill takes less than 2 seconds.      Findings: No erythema.  "  Neurological:      General: No focal deficit present.      Mental Status: He is alert.   Psychiatric:         Mood and Affect: Mood normal.       Vitals:    25 0813   BP: (!) 140/80   Pulse: 70   Temp: 97.7 °F (36.5 °C)   TempSrc: Oral   SpO2: 96%   Weight: (!) 154 kg (339 lb 8.1 oz)   Height: 6' 1" (1.854 m)      Body mass index is 44.79 kg/m².        History     Past Medical History:  Past Medical History:   Diagnosis Date    Anxiety     Colon polyp     Colon polyp     CPAP (continuous positive airway pressure) dependence     does not use    GERD (gastroesophageal reflux disease)     Gout 2018    HTN (hypertension) 2012    Hyperlipidemia     Insomnia with sleep apnea     Obesity, morbid, BMI 40.0-49.9 2014       Past Surgical History:  Past Surgical History:   Procedure Laterality Date    COLONOSCOPY N/A 5/3/2016    Procedure: COLONOSCOPY;  Surgeon: Chance Glynn MD;  Location: [facility] ([address] [place] [address]);  Service: Endoscopy;  Laterality: N/A;    COLONOSCOPY W/ POLYPECTOMY         Social History:  Social History     Socioeconomic History    Marital status:    Occupational History     Employer: [employer]   Tobacco Use    Smoking status: Former     Current packs/day: 0.00     Types: Cigarettes, Cigars     Quit date: 5/3/1993     Years since quittin.6    Smokeless tobacco: Never   Substance and Sexual Activity    Alcohol use: No     Alcohol/week: 0.0 standard drinks of alcohol    Drug use: No    Sexual activity: Yes     Partners: Female     Birth control/protection: None       Family History:  Family History   Problem Relation Name Age of Onset    Heart disease Mother 79     Hyperlipidemia Mother 79     Hypertension Mother 79     Lymphoma Mother 79     Hypertension Father 82     Hepatitis Brother 66        Allergies and Medications: (updated and reviewed)  Review of patient's allergies indicates:   Allergen Reactions    Diovan [valsartan]      Ringing in the ears    Lisinopril " Swelling    Tramadol      Nausea      Triamterene      Current Outpatient Medications   Medication Sig Dispense Refill    allopurinoL (ZYLOPRIM) 100 MG tablet Take 3 tablets (300 mg total) by mouth once daily. 270 tablet 3    ALPRAZolam (XANAX) 1 MG tablet TAKE 1 TABLET BY MOUTH NIGHTLY AS NEEDED FOR INSOMNIA 30 tablet 0    amLODIPine (NORVASC) 10 MG tablet Take 1 tablet (10 mg total) by mouth once daily. 90 tablet 3    aspirin (ECOTRIN) 81 MG EC tablet Take 81 mg by mouth once daily.      atorvastatin (LIPITOR) 20 MG tablet Take 1 tablet (20 mg total) by mouth once daily. 90 tablet 3    betamethasone acetate-betamethasone sodium phosphate (CELESTONE) 6 mg/mL injection       cetirizine (ZYRTEC) 10 MG tablet Take 1 tablet (10 mg total) by mouth 2 (two) times daily. 180 tablet 3    chlorthalidone (HYGROTEN) 25 MG Tab Take 1 tablet (25 mg total) by mouth once daily. 90 tablet 3    FLUARIX QUAD 2214-8966, PF, 60 mcg (15 mcg x 4)/0.5 mL Syrg       GAVILYTE-C 240-22.72-6.72 -5.84 gram SolR Take 4,000 mLs by mouth once.      omeprazole (PRILOSEC) 40 MG capsule Take 1 capsule (40 mg total) by mouth once daily. 90 capsule 3    colchicine (MITIGARE) 0.6 mg Cap Take 1.2 mg po at the onset of a gout flare, then 0.6 mg po 1 hour thereafter for max dose of 1.8 mg a day. 30 capsule 1    indomethacin (INDOCIN) 50 MG capsule Take 1 capsule (50 mg total) by mouth 3 (three) times daily as needed (gout pain). 90 capsule 0    predniSONE (DELTASONE) 20 MG tablet Take 1.5 tablets (30 mg total) by mouth once daily. for 5 days 8 tablet 0     No current facility-administered medications for this visit.       Patient Care Team:  Madeline Campbell DO as PCP - General (Family Medicine)  Yandy Whitt LPN as Care Coordinator         - The patient is given an After Visit Summary that lists all medications with directions, allergies, education, orders placed during this encounter and follow-up instructions.      - I have reviewed the  patient's medical information including past medical, family, and social history sections including the medications and allergies.      - We discussed the patient's current medications.     This note was created by combination of typed  and MModal dictation.  Transcription errors may be present.  If there are any questions, please contact me.            This note was generated with the assistance of ambient listening technology. Verbal consent was obtained by the patient and accompanying visitor(s) for the recording of patient appointment to facilitate this note. I attest to having reviewed and edited the generated note for accuracy, though some syntax or spelling errors may persist. Please contact the author of this note for any clarification.

## 2025-01-10 ENCOUNTER — OFFICE VISIT (OUTPATIENT)
Dept: FAMILY MEDICINE | Facility: CLINIC | Age: 65
End: 2025-01-10
Payer: MEDICARE

## 2025-01-10 VITALS
HEIGHT: 73 IN | OXYGEN SATURATION: 97 % | SYSTOLIC BLOOD PRESSURE: 142 MMHG | DIASTOLIC BLOOD PRESSURE: 76 MMHG | WEIGHT: 315 LBS | BODY MASS INDEX: 41.75 KG/M2 | HEART RATE: 70 BPM | TEMPERATURE: 98 F

## 2025-01-10 DIAGNOSIS — B02.9 HERPES ZOSTER WITHOUT COMPLICATION: Primary | ICD-10-CM

## 2025-01-10 DIAGNOSIS — I10 ESSENTIAL HYPERTENSION: ICD-10-CM

## 2025-01-10 DIAGNOSIS — E66.01 CLASS 3 SEVERE OBESITY DUE TO EXCESS CALORIES WITH SERIOUS COMORBIDITY AND BODY MASS INDEX (BMI) OF 45.0 TO 49.9 IN ADULT: ICD-10-CM

## 2025-01-10 DIAGNOSIS — E66.813 CLASS 3 SEVERE OBESITY DUE TO EXCESS CALORIES WITH SERIOUS COMORBIDITY AND BODY MASS INDEX (BMI) OF 45.0 TO 49.9 IN ADULT: ICD-10-CM

## 2025-01-10 PROCEDURE — 99999 PR PBB SHADOW E&M-EST. PATIENT-LVL IV: CPT | Mod: PBBFAC,,,

## 2025-01-10 RX ORDER — GABAPENTIN 100 MG/1
100 CAPSULE ORAL 3 TIMES DAILY
Qty: 30 CAPSULE | Refills: 0 | Status: SHIPPED | OUTPATIENT
Start: 2025-01-10 | End: 2026-01-10

## 2025-01-10 RX ORDER — TRIAMCINOLONE ACETONIDE 1 MG/G
OINTMENT TOPICAL 2 TIMES DAILY
Qty: 80 G | Refills: 0 | Status: SHIPPED | OUTPATIENT
Start: 2025-01-10

## 2025-01-10 RX ORDER — VALACYCLOVIR HYDROCHLORIDE 1 G/1
1000 TABLET, FILM COATED ORAL 3 TIMES DAILY
Qty: 21 TABLET | Refills: 0 | Status: SHIPPED | OUTPATIENT
Start: 2025-01-10 | End: 2025-01-17

## 2025-01-10 NOTE — PROGRESS NOTES
HPI     Chief Complaint:  Chief Complaint   Patient presents with    Rash       Reagan Carbajal is a 64 y.o. male with multiple medical diagnoses as listed in the medical history and problem list that presents for   Chief Complaint   Patient presents with    Rash    .     Patient is not known to me with his last appointment in this department on 1/3/2025.     Pt presents for rash.  Rash  This is a new problem. Episode onset: started 2 days ago. The affected locations include the chest and left shoulder. The rash is characterized by redness and blistering.   Spent the night at his rental property on Wednesday and woke up with rash.  Reports some increased stress in personal life recently.    Assessment & Plan     Problem List Items Addressed This Visit       Essential hypertension (Chronic)  BP in clinic today 142/76.  The current medical regimen is effective;  continue present plan and medications.     Other Visit Diagnoses       Herpes zoster without complication    -  Primary  Rash that started on the chest and spread to left shoulder 2 days ago.  See photo in physical exam.  Will treat with Valtrex, kenalog and gabapentin.    Relevant Medications    valACYclovir (VALTREX) 1000 MG tablet    triamcinolone acetonide 0.1% (KENALOG) 0.1 % ointment    gabapentin (NEURONTIN) 100 MG capsule    Class 3 severe obesity due to excess calories with serious comorbidity and body mass index (BMI) of 45.0 to 49.9 in adult      Continue healthy diet and exercise for weight loss.                --------------------------------------------      Health Maintenance:  Health Maintenance         Date Due Completion Date    Shingles Vaccine (1 of 2) Never done ---    RSV Vaccine (Age 60+ and Pregnant patients) (1 - Risk 60-74 years 1-dose series) Never done ---    Colorectal Cancer Screening 05/03/2021 5/3/2016    Abdominal Aortic Aneurysm Screening 07/03/2024 9/20/2018    COVID-19 Vaccine (4 - 2024-25 season) 09/01/2024 11/12/2021     "Hemoglobin A1c (Prediabetes) 2025    TETANUS VACCINE 2026    Lipid Panel 2029            Health maintenance reviewed    Follow Up:  No follow-ups on file.    Exam     Review of Systems:  (as noted above)  Review of Systems   Skin:  Positive for rash.       Physical Exam:   Physical Exam          Vitals:    01/10/25 1011   BP: (!) 142/76   BP Location: Right arm   Patient Position: Sitting   Pulse: 70   Temp: 97.8 °F (36.6 °C)   TempSrc: Oral   SpO2: 97%   Weight: (!) 154.7 kg (341 lb 0.8 oz)   Height: 6' 1" (1.854 m)      Body mass index is 45 kg/m².        History     Past Medical History:  Past Medical History:   Diagnosis Date    Anxiety     Colon polyp     Colon polyp     CPAP (continuous positive airway pressure) dependence     does not use    GERD (gastroesophageal reflux disease)     Gout 2018    HTN (hypertension) 2012    Hyperlipidemia     Insomnia with sleep apnea     Obesity, morbid, BMI 40.0-49.9 2014       Past Surgical History:  Past Surgical History:   Procedure Laterality Date    COLONOSCOPY N/A 5/3/2016    Procedure: COLONOSCOPY;  Surgeon: Chance Glynn MD;  Location: [facility] ([address] [place] [address]);  Service: Endoscopy;  Laterality: N/A;    COLONOSCOPY W/ POLYPECTOMY         Social History:  Social History     Socioeconomic History    Marital status:    Occupational History     Employer: [employer]   Tobacco Use    Smoking status: Former     Current packs/day: 0.00     Types: Cigarettes, Cigars     Quit date: 5/3/1993     Years since quittin.7    Smokeless tobacco: Never   Substance and Sexual Activity    Alcohol use: No     Alcohol/week: 0.0 standard drinks of alcohol    Drug use: No    Sexual activity: Yes     Partners: Female     Birth control/protection: None       Family History:  Family History   Problem Relation Name Age of Onset    Heart disease Mother 79     Hyperlipidemia Mother 79     Hypertension Mother 79     Lymphoma " Mother 79     Hypertension Father 82     Hepatitis Brother 66        Allergies and Medications: (updated and reviewed)  Review of patient's allergies indicates:   Allergen Reactions    Diovan [valsartan]      Ringing in the ears    Lisinopril Swelling    Tramadol      Nausea      Triamterene      Current Outpatient Medications   Medication Sig Dispense Refill    allopurinoL (ZYLOPRIM) 100 MG tablet Take 3 tablets (300 mg total) by mouth once daily. 270 tablet 3    ALPRAZolam (XANAX) 1 MG tablet TAKE 1 TABLET BY MOUTH NIGHTLY AS NEEDED FOR INSOMNIA 30 tablet 0    amLODIPine (NORVASC) 10 MG tablet Take 1 tablet (10 mg total) by mouth once daily. 90 tablet 3    aspirin (ECOTRIN) 81 MG EC tablet Take 81 mg by mouth once daily.      atorvastatin (LIPITOR) 20 MG tablet Take 1 tablet (20 mg total) by mouth once daily. 90 tablet 3    betamethasone acetate-betamethasone sodium phosphate (CELESTONE) 6 mg/mL injection       cetirizine (ZYRTEC) 10 MG tablet Take 1 tablet (10 mg total) by mouth 2 (two) times daily. 180 tablet 3    chlorthalidone (HYGROTEN) 25 MG Tab Take 1 tablet (25 mg total) by mouth once daily. 90 tablet 3    colchicine (MITIGARE) 0.6 mg Cap Take 1.2 mg po at the onset of a gout flare, then 0.6 mg po 1 hour thereafter for max dose of 1.8 mg a day. 30 capsule 1    FLUARIX QUAD 1870-3535, PF, 60 mcg (15 mcg x 4)/0.5 mL Syrg       GAVILYTE-C 240-22.72-6.72 -5.84 gram SolR Take 4,000 mLs by mouth once.      indomethacin (INDOCIN) 50 MG capsule Take 1 capsule (50 mg total) by mouth 3 (three) times daily as needed (gout pain). 90 capsule 0    omeprazole (PRILOSEC) 40 MG capsule Take 1 capsule (40 mg total) by mouth once daily. 90 capsule 3    gabapentin (NEURONTIN) 100 MG capsule Take 1 capsule (100 mg total) by mouth 3 (three) times daily. 30 capsule 0    triamcinolone acetonide 0.1% (KENALOG) 0.1 % ointment Apply topically 2 (two) times daily. 80 g 0    valACYclovir (VALTREX) 1000 MG tablet Take 1 tablet (1,000  mg total) by mouth 3 (three) times daily. for 7 days 21 tablet 0     No current facility-administered medications for this visit.       Patient Care Team:  Madeline Campbell DO as PCP - General (Family Medicine)  Yandy Whitt LPN as Care Coordinator         - The patient is given an After Visit Summary that lists all medications with directions, allergies, education, orders placed during this encounter and follow-up instructions.      - I have reviewed the patient's medical information including past medical, family, and social history sections including the medications and allergies.      - We discussed the patient's current medications.     This note was created by combination of typed  and MModal dictation.  Transcription errors may be present.  If there are any questions, please contact me.       Yoko Schneider NP

## 2025-01-14 ENCOUNTER — TELEPHONE (OUTPATIENT)
Dept: FAMILY MEDICINE | Facility: CLINIC | Age: 65
End: 2025-01-14
Payer: MEDICARE

## 2025-01-14 NOTE — TELEPHONE ENCOUNTER
----- Message from Honey Ambrocio NP sent at 1/14/2025 12:06 PM CST -----  Regarding: RE: Reagan  If symptoms resolved can stop medication but schedule appt with any concerns/questions  ----- Message -----  From: Cristian Eastman MA  Sent: 1/14/2025  11:43 AM CST  To: Honey Ambrocio NP  Subject: FW: Reagan                                         Please advise. Thanks  ----- Message -----  From: Hillary Vallejo  Sent: 1/14/2025   7:35 AM CST  To: [facility] Staff  Subject: Reagan                                             Who called:Reagan    What is the request in detail: Patient was seen in office on 1/3/2025 for shingles and was prescribed medication for it, and would like to know if he can stop taking the medication since it is looking like it has dried up, please reach out to the patient    Can the clinic reply by [facility]? No    Would the patient rather a call back or a response via [facility]? Callback    Best call back number:.[phone]      Additional Information:

## 2025-01-14 NOTE — TELEPHONE ENCOUNTER
Patient called back and I was able to addressed his issues. Patient will call back if he has any other problems.

## 2025-03-10 ENCOUNTER — OFFICE VISIT (OUTPATIENT)
Dept: FAMILY MEDICINE | Facility: CLINIC | Age: 65
End: 2025-03-10
Payer: MEDICARE

## 2025-03-10 VITALS
SYSTOLIC BLOOD PRESSURE: 155 MMHG | DIASTOLIC BLOOD PRESSURE: 88 MMHG | HEIGHT: 73 IN | OXYGEN SATURATION: 97 % | WEIGHT: 315 LBS | HEART RATE: 72 BPM | TEMPERATURE: 98 F | BODY MASS INDEX: 41.75 KG/M2 | RESPIRATION RATE: 18 BRPM

## 2025-03-10 DIAGNOSIS — R21 RASH OF ENTIRE BODY: Primary | ICD-10-CM

## 2025-03-10 DIAGNOSIS — I10 ESSENTIAL HYPERTENSION: Chronic | ICD-10-CM

## 2025-03-10 PROCEDURE — 99214 OFFICE O/P EST MOD 30 MIN: CPT | Mod: 25,S$GLB,,

## 2025-03-10 PROCEDURE — 3077F SYST BP >= 140 MM HG: CPT | Mod: CPTII,S$GLB,,

## 2025-03-10 PROCEDURE — 3079F DIAST BP 80-89 MM HG: CPT | Mod: CPTII,S$GLB,,

## 2025-03-10 PROCEDURE — 1159F MED LIST DOCD IN RCRD: CPT | Mod: CPTII,S$GLB,,

## 2025-03-10 PROCEDURE — 3008F BODY MASS INDEX DOCD: CPT | Mod: CPTII,S$GLB,,

## 2025-03-10 PROCEDURE — 1126F AMNT PAIN NOTED NONE PRSNT: CPT | Mod: CPTII,S$GLB,,

## 2025-03-10 PROCEDURE — 99999 PR PBB SHADOW E&M-EST. PATIENT-LVL V: CPT | Mod: PBBFAC,,,

## 2025-03-10 PROCEDURE — 96372 THER/PROPH/DIAG INJ SC/IM: CPT | Mod: S$GLB,,,

## 2025-03-10 RX ORDER — TRIAMCINOLONE ACETONIDE 40 MG/ML
40 INJECTION, SUSPENSION INTRA-ARTICULAR; INTRAMUSCULAR
Status: COMPLETED | OUTPATIENT
Start: 2025-03-10 | End: 2025-03-10

## 2025-03-10 RX ADMIN — TRIAMCINOLONE ACETONIDE 40 MG: 40 INJECTION, SUSPENSION INTRA-ARTICULAR; INTRAMUSCULAR at 03:03

## 2025-03-10 NOTE — PROGRESS NOTES
Health Maintenance Due   Topic Date Due    Shingles Vaccine (1 of 2) Consult pcp    RSV Vaccine (Age 60+ and Pregnant patients) (1 - Risk 60-74 years 1-dose series) Not offered at this office    Colorectal Cancer Screening  05/03/2021    Abdominal Aortic Aneurysm Screening  07/03/2024    COVID-19 Vaccine (4 - 2024-25 season) 09/01/2024

## 2025-03-10 NOTE — PROGRESS NOTES
Administered Kenalog 40 mg IM to left upper outer quad gluteus.  Patient tolerated injection well, no adverse reactions noted.
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
## 2025-03-24 DIAGNOSIS — Z00.00 ENCOUNTER FOR MEDICARE ANNUAL WELLNESS EXAM: ICD-10-CM

## 2025-03-31 ENCOUNTER — PATIENT MESSAGE (OUTPATIENT)
Dept: ADMINISTRATIVE | Facility: HOSPITAL | Age: 65
End: 2025-03-31
Payer: MEDICARE

## 2025-04-22 DIAGNOSIS — Z79.899 CONTROLLED SUBSTANCE AGREEMENT SIGNED: ICD-10-CM

## 2025-04-22 DIAGNOSIS — F51.02 TRANSIENT INSOMNIA: ICD-10-CM

## 2025-04-29 RX ORDER — ALPRAZOLAM 1 MG/1
1 TABLET ORAL NIGHTLY
Qty: 30 TABLET | Refills: 0 | Status: SHIPPED | OUTPATIENT
Start: 2025-04-29

## 2025-05-17 ENCOUNTER — TELEPHONE (OUTPATIENT)
Dept: ENDOSCOPY | Facility: HOSPITAL | Age: 65
End: 2025-05-17
Payer: MEDICARE

## 2025-05-17 DIAGNOSIS — Z12.11 COLON CANCER SCREENING: Primary | ICD-10-CM

## 2025-05-17 NOTE — TELEPHONE ENCOUNTER
Contacted the patient to schedule an endoscopy procedure(s) Colonoscopy . The patient did not answer the call and left a voice message requesting a call back. ,

## 2025-05-19 ENCOUNTER — TELEPHONE (OUTPATIENT)
Dept: ENDOSCOPY | Facility: HOSPITAL | Age: 65
End: 2025-05-19
Payer: MEDICARE

## 2025-05-19 DIAGNOSIS — Z12.11 COLON CANCER SCREENING: Primary | ICD-10-CM

## 2025-05-19 NOTE — TELEPHONE ENCOUNTER
Contact patient to schedule colonoscopy procedure. Patient decline to schedule colonoscopy at this time stating that he needs to see a cardiologist. Patient stated he will contact the office back to schedule.

## 2025-05-20 ENCOUNTER — RESULTS FOLLOW-UP (OUTPATIENT)
Dept: INTERNAL MEDICINE | Facility: CLINIC | Age: 65
End: 2025-05-20

## 2025-05-20 ENCOUNTER — OFFICE VISIT (OUTPATIENT)
Dept: FAMILY MEDICINE | Facility: CLINIC | Age: 65
End: 2025-05-20
Payer: MEDICARE

## 2025-05-20 ENCOUNTER — APPOINTMENT (OUTPATIENT)
Dept: LAB | Facility: HOSPITAL | Age: 65
End: 2025-05-20
Attending: FAMILY MEDICINE
Payer: MEDICARE

## 2025-05-20 VITALS
HEIGHT: 73 IN | HEART RATE: 65 BPM | WEIGHT: 315 LBS | DIASTOLIC BLOOD PRESSURE: 76 MMHG | OXYGEN SATURATION: 97 % | RESPIRATION RATE: 18 BRPM | TEMPERATURE: 98 F | BODY MASS INDEX: 41.75 KG/M2 | SYSTOLIC BLOOD PRESSURE: 130 MMHG

## 2025-05-20 DIAGNOSIS — E66.813 CLASS 3 SEVERE OBESITY DUE TO EXCESS CALORIES WITH SERIOUS COMORBIDITY AND BODY MASS INDEX (BMI) OF 40.0 TO 44.9 IN ADULT: ICD-10-CM

## 2025-05-20 DIAGNOSIS — E78.5 HYPERLIPIDEMIA, UNSPECIFIED HYPERLIPIDEMIA TYPE: ICD-10-CM

## 2025-05-20 DIAGNOSIS — M1A.0710 IDIOPATHIC CHRONIC GOUT OF RIGHT FOOT WITHOUT TOPHUS: Primary | ICD-10-CM

## 2025-05-20 DIAGNOSIS — I10 ESSENTIAL HYPERTENSION: ICD-10-CM

## 2025-05-20 DIAGNOSIS — Z86.19 HISTORY OF SHINGLES: ICD-10-CM

## 2025-05-20 DIAGNOSIS — R73.03 PRE-DIABETES: ICD-10-CM

## 2025-05-20 DIAGNOSIS — E66.01 CLASS 3 SEVERE OBESITY DUE TO EXCESS CALORIES WITH SERIOUS COMORBIDITY AND BODY MASS INDEX (BMI) OF 40.0 TO 44.9 IN ADULT: ICD-10-CM

## 2025-05-20 DIAGNOSIS — M17.0 PRIMARY OSTEOARTHRITIS OF BOTH KNEES: ICD-10-CM

## 2025-05-20 PROCEDURE — 1126F AMNT PAIN NOTED NONE PRSNT: CPT | Mod: CPTII,S$GLB,, | Performed by: FAMILY MEDICINE

## 2025-05-20 PROCEDURE — 3075F SYST BP GE 130 - 139MM HG: CPT | Mod: CPTII,S$GLB,, | Performed by: FAMILY MEDICINE

## 2025-05-20 PROCEDURE — 3078F DIAST BP <80 MM HG: CPT | Mod: CPTII,S$GLB,, | Performed by: FAMILY MEDICINE

## 2025-05-20 PROCEDURE — 99999 PR PBB SHADOW E&M-EST. PATIENT-LVL V: CPT | Mod: PBBFAC,,, | Performed by: FAMILY MEDICINE

## 2025-05-20 PROCEDURE — 1101F PT FALLS ASSESS-DOCD LE1/YR: CPT | Mod: CPTII,S$GLB,, | Performed by: FAMILY MEDICINE

## 2025-05-20 PROCEDURE — 3008F BODY MASS INDEX DOCD: CPT | Mod: CPTII,S$GLB,, | Performed by: FAMILY MEDICINE

## 2025-05-20 PROCEDURE — 1160F RVW MEDS BY RX/DR IN RCRD: CPT | Mod: CPTII,S$GLB,, | Performed by: FAMILY MEDICINE

## 2025-05-20 PROCEDURE — 99214 OFFICE O/P EST MOD 30 MIN: CPT | Mod: S$GLB,,, | Performed by: FAMILY MEDICINE

## 2025-05-20 PROCEDURE — 3288F FALL RISK ASSESSMENT DOCD: CPT | Mod: CPTII,S$GLB,, | Performed by: FAMILY MEDICINE

## 2025-05-20 PROCEDURE — 1159F MED LIST DOCD IN RCRD: CPT | Mod: CPTII,S$GLB,, | Performed by: FAMILY MEDICINE

## 2025-05-20 PROCEDURE — G2211 COMPLEX E/M VISIT ADD ON: HCPCS | Mod: S$GLB,,, | Performed by: FAMILY MEDICINE

## 2025-05-20 RX ORDER — ALLOPURINOL 100 MG/1
100 TABLET ORAL DAILY
Qty: 30 TABLET | Refills: 5 | Status: SHIPPED | OUTPATIENT
Start: 2025-05-20

## 2025-05-20 NOTE — PROGRESS NOTES
Assessment & Plan:    Idiopathic chronic gout of right foot without tophus  -     allopurinoL (ZYLOPRIM) 100 MG tablet; Take 1 tablet (100 mg total) by mouth once daily.  Dispense: 30 tablet; Refill: 5  -     Uric Acid; Future; Expected date: 05/20/2025    Discussed low purine diet.  Informational handout provided.  Restart allopurinol 100 mg daily.   Repeat uric acid before next follow-up.    Class 3 severe obesity due to excess calories with serious comorbidity and body mass index (BMI) of 40.0 to 44.9 in adult  -     semaglutide, weight loss, 0.25 mg/0.5 mL PnIj; Inject 0.25 mg into the skin once a week.  Dispense: 2 mL; Refill: 11    Pre-diabetes  -     semaglutide, weight loss, 0.25 mg/0.5 mL PnIj; Inject 0.25 mg into the skin once a week.  Dispense: 2 mL; Refill: 11    Discussed benefit of GLP-1 agonists as adjunctive therapy for weight loss, as well as the additional benefits this class of medications has on other health conditions.  Reviewed MOA and common GI side effects.  Start semaglutide. Counseled on appropriate expectations of this medication. Patient was informed of the potential for the weight to plateau on this medication, even at maximum doses, as well as the risk of regaining the weight if medication is discontinued.  A1c is in process and will follow-up with patient over the phone when this is resulted.    History of shingles  Recommended shingles vaccines.    Essential hypertension  Controlled. Continue current therapy.     Hyperlipidemia, unspecified hyperlipidemia type  Controlled. Continue current therapy.     Primary osteoarthritis of both knees  Controlled. Continue current therapy.       Health maintenance reviewed:  Reminded patient to get his screening US for a AAA - number provided.   Colonoscopy scheduled on 05/22.    Encouraged RSV vaccine.    Follow-up: Follow up in about 3 months (around 8/20/2025) for weight loss and  gout.  ______________________________________________________________________    Chief Complaint  Chief Complaint   Patient presents with    Health Maintenance       HPI  Reagan Carbajal is a 64 y.o. male with medical diagnoses as listed in the medical history and problem list that presents to the office to follow up on his chronic conditions. Continues to have occasional gout flares, admits that they are triggered by his diet.  Non compliant with allopurinol. Interested in weight loss medication.     Health Maintenance         Date Due Completion Date    Shingles Vaccine (1 of 2) Never done ---    RSV Vaccine (Age 60+ and Pregnant patients) (1 - Risk 60-74 years 1-dose series) Never done ---    Colorectal Cancer Screening 05/03/2021 5/3/2016    Abdominal Aortic Aneurysm Screening 07/03/2024 9/20/2018    COVID-19 Vaccine (4 - 2024-25 season) 09/01/2024 11/12/2021    Hemoglobin A1c (Prediabetes) 08/23/2025 8/23/2024    TETANUS VACCINE 08/02/2026 8/2/2016    Lipid Panel 05/20/2030 5/20/2025              PAST MEDICAL HISTORY:  Past Medical History:   Diagnosis Date    Anxiety     Colon polyp     Colon polyp     CPAP (continuous positive airway pressure) dependence     does not use    GERD (gastroesophageal reflux disease)     Gout 9/13/2018    HTN (hypertension) 11/12/2012    Hyperlipidemia     Insomnia with sleep apnea     Obesity, morbid, BMI 40.0-49.9 6/12/2014       PAST SURGICAL HISTORY:  Past Surgical History:   Procedure Laterality Date    COLONOSCOPY N/A 5/3/2016    Procedure: COLONOSCOPY;  Surgeon: Chance Glynn MD;  Location: [address]);  Service: Endoscopy;  Laterality: N/A;    COLONOSCOPY W/ POLYPECTOMY         SOCIAL HISTORY:  Social History[1]    FAMILY HISTORY:  Family History   Problem Relation Name Age of Onset    Heart disease Mother 79     Hyperlipidemia Mother 79     Hypertension Mother 79     Lymphoma Mother 79     Hypertension Father 82     Hepatitis Brother 66        ALLERGIES AND  "MEDICATIONS: updated and reviewed.  Review of patient's allergies indicates:   Allergen Reactions    Diovan [valsartan]      Ringing in the ears    Lisinopril Swelling    Tramadol      Nausea      Triamterene      Current Medications[2]      ROS  Review of Systems   Constitutional:  Positive for unexpected weight change. Negative for activity change.   Musculoskeletal:  Positive for arthralgias.           Physical Exam  Vitals:    05/20/25 0918   BP: 130/76   Patient Position: Sitting   Pulse: 65   Resp: 18   Temp: 98.4 °F (36.9 °C)   TempSrc: Oral   SpO2: 97%   Weight: (!) 148.5 kg (327 lb 6.1 oz)   Height: 6' 1" (1.854 m)    Body mass index is 43.19 kg/m².  Weight: (!) 148.5 kg (327 lb 6.1 oz)   Height: 6' 1" (185.4 cm)   Physical Exam  Constitutional:       General: He is not in acute distress.     Appearance: He is obese.   HENT:      Head: Normocephalic and atraumatic.   Neck:      Thyroid: No thyromegaly.      Vascular: No carotid bruit.   Cardiovascular:      Rate and Rhythm: Normal rate and regular rhythm.      Pulses: Normal pulses.      Heart sounds: Normal heart sounds.   Pulmonary:      Effort: Pulmonary effort is normal. No respiratory distress.      Breath sounds: Normal breath sounds.   Musculoskeletal:      Cervical back: Neck supple.      Right lower leg: No edema.      Left lower leg: No edema.   Lymphadenopathy:      Cervical: No cervical adenopathy.   Skin:     General: Skin is warm and dry.      Findings: No rash.   Neurological:      General: No focal deficit present.      Mental Status: He is alert and oriented to person, place, and time.   Psychiatric:         Mood and Affect: Mood normal.         Behavior: Behavior normal.         Thought Content: Thought content normal.                  [1]   Social History  Socioeconomic History    Marital status:    Occupational History     Employer: [employer]   Tobacco Use    Smoking status: Former     Current packs/day: 0.00     Types: " Cigarettes, Cigars     Quit date: 5/3/1993     Years since quittin.0    Smokeless tobacco: Never   Substance and Sexual Activity    Alcohol use: No     Alcohol/week: 0.0 standard drinks of alcohol    Drug use: No    Sexual activity: Yes     Partners: Female     Birth control/protection: None   [2]   Current Outpatient Medications   Medication Sig Dispense Refill    ALPRAZolam (XANAX) 1 MG tablet TAKE 1 TABLET BY MOUTH NIGHTLY AS NEEDED FOR INSOMNIA 30 tablet 0    amLODIPine (NORVASC) 10 MG tablet Take 1 tablet (10 mg total) by mouth once daily. 90 tablet 3    aspirin (ECOTRIN) 81 MG EC tablet Take 81 mg by mouth once daily.      atorvastatin (LIPITOR) 20 MG tablet Take 1 tablet (20 mg total) by mouth once daily. 90 tablet 3    cetirizine (ZYRTEC) 10 MG tablet Take 1 tablet (10 mg total) by mouth 2 (two) times daily. 180 tablet 3    chlorthalidone (HYGROTEN) 25 MG Tab Take 1 tablet (25 mg total) by mouth once daily. 90 tablet 3    colchicine (MITIGARE) 0.6 mg Cap Take 1.2 mg po at the onset of a gout flare, then 0.6 mg po 1 hour thereafter for max dose of 1.8 mg a day. 30 capsule 1    omeprazole (PRILOSEC) 40 MG capsule Take 1 capsule (40 mg total) by mouth once daily. 90 capsule 3    allopurinoL (ZYLOPRIM) 100 MG tablet Take 1 tablet (100 mg total) by mouth once daily. 30 tablet 5    gabapentin (NEURONTIN) 100 MG capsule Take 1 capsule (100 mg total) by mouth 3 (three) times daily. (Patient not taking: Reported on 2025) 30 capsule 0    semaglutide, weight loss, 0.25 mg/0.5 mL PnIj Inject 0.25 mg into the skin once a week. 2 mL 11    triamcinolone acetonide 0.1% (KENALOG) 0.1 % ointment Apply topically 2 (two) times daily. (Patient not taking: Reported on 2025) 80 g 0    valACYclovir (VALTREX) 1000 MG tablet Take 1 tablet (1,000 mg total) by mouth 3 (three) times daily. for 7 days (Patient not taking: Reported on 2025) 21 tablet 0     No current facility-administered medications for this visit.

## 2025-05-20 NOTE — PATIENT INSTRUCTIONS
Call [phone] to schedule the aortic ultrasound.    Make sure you ask about the shingles vaccines (2 shots at least 2 months apart) at your pharmacy.     You are due for the RSV vaccine.

## 2025-05-22 ENCOUNTER — CLINICAL SUPPORT (OUTPATIENT)
Dept: ENDOSCOPY | Facility: HOSPITAL | Age: 65
End: 2025-05-22
Payer: MEDICARE

## 2025-05-22 ENCOUNTER — TELEPHONE (OUTPATIENT)
Dept: ENDOSCOPY | Facility: HOSPITAL | Age: 65
End: 2025-05-22

## 2025-05-22 DIAGNOSIS — Z12.11 COLON CANCER SCREENING: ICD-10-CM

## 2025-06-02 ENCOUNTER — TELEPHONE (OUTPATIENT)
Dept: ENDOSCOPY | Facility: HOSPITAL | Age: 65
End: 2025-06-02
Payer: MEDICARE

## 2025-06-02 VITALS — HEIGHT: 73 IN | WEIGHT: 300 LBS | BODY MASS INDEX: 39.76 KG/M2

## 2025-06-02 DIAGNOSIS — Z12.11 SPECIAL SCREENING FOR MALIGNANT NEOPLASMS, COLON: ICD-10-CM

## 2025-06-02 DIAGNOSIS — Z12.11 COLON CANCER SCREENING: Primary | ICD-10-CM

## 2025-06-27 DIAGNOSIS — F51.02 TRANSIENT INSOMNIA: ICD-10-CM

## 2025-06-27 DIAGNOSIS — Z79.899 CONTROLLED SUBSTANCE AGREEMENT SIGNED: ICD-10-CM

## 2025-06-27 RX ORDER — ALPRAZOLAM 1 MG/1
1 TABLET ORAL NIGHTLY
Qty: 30 TABLET | Refills: 0 | Status: SHIPPED | OUTPATIENT
Start: 2025-06-27

## 2025-06-27 NOTE — TELEPHONE ENCOUNTER
No care due was identified.  [facility] Embedded Care Due Messages. Reference number: [id number].   6/27/2025 1:55:33 PM CDT

## 2025-07-03 ENCOUNTER — TELEPHONE (OUTPATIENT)
Dept: GASTROENTEROLOGY | Facility: CLINIC | Age: 65
End: 2025-07-03
Payer: MEDICARE

## 2025-07-03 NOTE — TELEPHONE ENCOUNTER
MA attempted to contact patient to reschedule his procedure with Dr. Hua, but he did not answer. A voicemail was left for patient to return a call to our office.

## 2025-07-25 ENCOUNTER — TELEPHONE (OUTPATIENT)
Dept: ENDOSCOPY | Facility: HOSPITAL | Age: 65
End: 2025-07-25
Payer: MEDICARE

## 2025-07-25 VITALS — BODY MASS INDEX: 39.73 KG/M2 | WEIGHT: 299.81 LBS | HEIGHT: 73 IN

## 2025-07-25 DIAGNOSIS — Z12.11 SPECIAL SCREENING FOR MALIGNANT NEOPLASMS, COLON: Primary | ICD-10-CM

## 2025-07-25 NOTE — TELEPHONE ENCOUNTER
Patient is scheduled for a Colonoscopy on 9/24/25 with Dr. CLARISA Ruiz  Referral for procedure from Telephone call

## 2025-08-16 ENCOUNTER — LAB VISIT (OUTPATIENT)
Dept: LAB | Facility: HOSPITAL | Age: 65
End: 2025-08-16
Attending: FAMILY MEDICINE
Payer: MEDICARE

## 2025-08-16 DIAGNOSIS — M1A.0710 IDIOPATHIC CHRONIC GOUT OF RIGHT FOOT WITHOUT TOPHUS: ICD-10-CM

## 2025-08-16 LAB — URATE SERPL-MCNC: 8 MG/DL (ref 3.4–7)

## 2025-08-16 PROCEDURE — 36415 COLL VENOUS BLD VENIPUNCTURE: CPT

## 2025-08-16 PROCEDURE — 84550 ASSAY OF BLOOD/URIC ACID: CPT
